# Patient Record
Sex: FEMALE | Race: BLACK OR AFRICAN AMERICAN | NOT HISPANIC OR LATINO | Employment: FULL TIME | URBAN - METROPOLITAN AREA
[De-identification: names, ages, dates, MRNs, and addresses within clinical notes are randomized per-mention and may not be internally consistent; named-entity substitution may affect disease eponyms.]

---

## 2018-05-30 PROBLEM — E66.9 OBESITY: Status: ACTIVE | Noted: 2018-05-30

## 2019-06-15 ENCOUNTER — OFFICE VISIT (OUTPATIENT)
Dept: URGENT CARE | Facility: CLINIC | Age: 24
End: 2019-06-15
Payer: COMMERCIAL

## 2019-06-15 VITALS
HEART RATE: 124 BPM | SYSTOLIC BLOOD PRESSURE: 141 MMHG | OXYGEN SATURATION: 100 % | DIASTOLIC BLOOD PRESSURE: 82 MMHG | BODY MASS INDEX: 37.91 KG/M2 | WEIGHT: 206 LBS | HEIGHT: 62 IN | RESPIRATION RATE: 19 BRPM | TEMPERATURE: 102 F

## 2019-06-15 DIAGNOSIS — J02.9 SORE THROAT: ICD-10-CM

## 2019-06-15 DIAGNOSIS — J03.90 EXUDATIVE TONSILLITIS: Primary | ICD-10-CM

## 2019-06-15 DIAGNOSIS — R50.9 FEVER, UNSPECIFIED FEVER CAUSE: ICD-10-CM

## 2019-06-15 DIAGNOSIS — R03.0 ELEVATED BP WITHOUT DIAGNOSIS OF HYPERTENSION: ICD-10-CM

## 2019-06-15 LAB
CTP QC/QA: YES
CTP QC/QA: YES
FLUAV AG NPH QL: NEGATIVE
FLUBV AG NPH QL: NEGATIVE
S PYO RRNA THROAT QL PROBE: NEGATIVE

## 2019-06-15 PROCEDURE — 99203 PR OFFICE/OUTPT VISIT, NEW, LEVL III, 30-44 MIN: ICD-10-PCS | Mod: 25,S$GLB,, | Performed by: NURSE PRACTITIONER

## 2019-06-15 PROCEDURE — 87070 CULTURE OTHR SPECIMN AEROBIC: CPT

## 2019-06-15 PROCEDURE — 87804 INFLUENZA ASSAY W/OPTIC: CPT | Mod: QW,S$GLB,, | Performed by: NURSE PRACTITIONER

## 2019-06-15 PROCEDURE — 99203 OFFICE O/P NEW LOW 30 MIN: CPT | Mod: 25,S$GLB,, | Performed by: NURSE PRACTITIONER

## 2019-06-15 PROCEDURE — 87147 CULTURE TYPE IMMUNOLOGIC: CPT

## 2019-06-15 PROCEDURE — 87880 POCT RAPID STREP A: ICD-10-PCS | Mod: QW,S$GLB,, | Performed by: NURSE PRACTITIONER

## 2019-06-15 PROCEDURE — 87804 POCT INFLUENZA A/B: ICD-10-PCS | Mod: 59,QW,S$GLB, | Performed by: NURSE PRACTITIONER

## 2019-06-15 PROCEDURE — 87880 STREP A ASSAY W/OPTIC: CPT | Mod: QW,S$GLB,, | Performed by: NURSE PRACTITIONER

## 2019-06-15 PROCEDURE — 96372 THER/PROPH/DIAG INJ SC/IM: CPT | Mod: S$GLB,,, | Performed by: NURSE PRACTITIONER

## 2019-06-15 PROCEDURE — 96372 PR INJECTION,THERAP/PROPH/DIAG2ST, IM OR SUBCUT: ICD-10-PCS | Mod: S$GLB,,, | Performed by: NURSE PRACTITIONER

## 2019-06-15 RX ORDER — BETAMETHASONE SODIUM PHOSPHATE AND BETAMETHASONE ACETATE 3; 3 MG/ML; MG/ML
6 INJECTION, SUSPENSION INTRA-ARTICULAR; INTRALESIONAL; INTRAMUSCULAR; SOFT TISSUE
Status: COMPLETED | OUTPATIENT
Start: 2019-06-15 | End: 2019-06-15

## 2019-06-15 RX ORDER — AMOXICILLIN 875 MG/1
875 TABLET, FILM COATED ORAL 2 TIMES DAILY
Qty: 20 TABLET | Refills: 0 | Status: SHIPPED | OUTPATIENT
Start: 2019-06-15 | End: 2019-06-25

## 2019-06-15 RX ORDER — TRIPROLIDINE/PSEUDOEPHEDRINE 2.5MG-60MG
600 TABLET ORAL
Status: COMPLETED | OUTPATIENT
Start: 2019-06-15 | End: 2019-06-15

## 2019-06-15 RX ORDER — PREDNISONE 20 MG/1
40 TABLET ORAL DAILY
Qty: 10 TABLET | Refills: 0 | Status: SHIPPED | OUTPATIENT
Start: 2019-06-15 | End: 2019-06-20

## 2019-06-15 RX ADMIN — Medication 600 MG: at 12:06

## 2019-06-15 RX ADMIN — BETAMETHASONE SODIUM PHOSPHATE AND BETAMETHASONE ACETATE 6 MG: 3; 3 INJECTION, SUSPENSION INTRA-ARTICULAR; INTRALESIONAL; INTRAMUSCULAR; SOFT TISSUE at 12:06

## 2019-06-15 NOTE — PATIENT INSTRUCTIONS
If your condition worsens or fails to improve we recommend that you receive another evaluation at the emergency room immediately or contact your primary medical clinic to discuss your concerns.   You must understand that you have received an Urgent Care treatment only and that you may be released before all of your medical problems are known or treated. You, the patient, will arrange for follow up care as instructed.   Please return here or go to the Emergency Department for any concerns or worsening of condition.  If you were prescribed antibiotics, please take them to completion.  If you were prescribed a narcotic medication, do not drive or operate heavy equipment or machinery while taking these medications.  Please follow up with your primary care doctor or specialist as needed.    If you  smoke, please stop smoking.    Pharyngitis (Sore Throat), Report Pending    Pharyngitis (sore throat) is often due to a virus. It can also be caused by the streptococcus, or strep, bacterium, often called strep throat. Both viral and strep infections can cause throat pain that is worse when swallowing, aching all over with headache, and fever. Both types of infections are contagious. They may be spread by coughing, kissing, or touching others after touching your mouth or nose.  A test has been done to find out whether you (or your child, if your child is the patient) have strep throat. Call this facility or your healthcare provider if you were not given your test results. If the test is positive for strep infection, you will need to take antibiotic medicines. A prescription can be called into your pharmacy at that time. If the test is negative, you probably have a viral pharyngitis. This does not need to be treated with antibiotics. Until you receive the results of the strep test, you should stay home from work. If your child is being tested, he or she should stay home from school.  Home care  · Rest at home. Drink plenty of  fluids so you won't get dehydrated.  · If the test is positive for strep, don't go to work or school for the first 2 days of taking the antibiotics. After this time, you will not be contagious. You can then return to work or school if you are feeling better.   · Take the antibiotic medicine for the full 10 days, even if you feel better. This is very important to make sure the infection is treated. It is also important to prevent drug-resistant germs from developing. If you were given an antibiotic shot, you won't need more antibiotics.  · For children: Use acetaminophen for fever, fussiness, or discomfort. In infants older than 6 months of age, you may use ibuprofen instead of acetaminophen. Talk with your child's healthcare provider before giving these medicines if your child has chronic liver or kidney disease or ever had a stomach ulcer or GI bleeding. Never give aspirin to a child under 18 years of age who is ill with a fever. It may cause severe liver damage.  · For adults: Use acetaminophen or ibuprofen to control pain or fever, unless another medicine was prescribed for this. Talk with your healthcare provider before taking these medicines if you have chronic liver or kidney disease or ever had a stomach ulcer or GI bleeding.  · Use throat lozenges or numbing throat sprays to help reduce pain. Gargling with warm salt water will also help reduce throat pain. For this, dissolve 1/2 teaspoon of salt in 1 glass of warm water. To help soothe a sore throat, children can sip on juice or a popsicle. Children 5 years and older can also suck on a lollipop or hard candy.  · Don't eat salty or spicy foods. These can irritate the throat.  Follow-up care  Follow up with your healthcare provider or our staff if you don't get better over the next week.  When to seek medical advice  Call your healthcare provider right away if any of these occur:  · Fever as directed by your healthcare provider. For children, seek care  if:  ¨ Your child is of any age and has repeated fevers above 104°F (40°C).  ¨ Your child is younger than 2 years of age and has a fever of 100.4°F (38°C) that continues for more than 1 day.  ¨ Your child is 2 years old or older and has a fever of 100.4°F (38°C) that continues for more than 3 days.  · New or worsening ear pain, sinus pain, or headache  · Painful lumps in the back of neck  · Stiff neck  · Lymph nodes are getting larger  · Inability to swallow liquids, excessive drooling, or inability to open mouth wide due to throat pain  · Signs of dehydration (very dark urine or no urine, sunken eyes, dizziness)  · Trouble breathing or noisy breathing  · Muffled voice  · New rash  · Child appears to be getting sicker  Date Last Reviewed: 4/13/2015  © 7301-3333 [employer]. [address] [place] [address]. All rights reserved. This information is not intended as a substitute for professional medical care. Always follow your healthcare professional's instructions.        When Your Child Has Pharyngitis or Tonsillitis    Your childs throat feels sore. This is likely because of redness and swelling (inflammation) of the throat. Two areas of the throat are most often affected: the pharynx and tonsils. Inflammation of the pharynx (pharyngitis) and inflammation of the tonsils (tonsillitis) are very common in children. This sheet tells you what you can do to relieve your childs throat pain.  What causes pharyngitis or tonsillitis?  Most commonly, pharyngitis and tonsillitis are caused by a viral or bacterial infection.  What are the symptoms of pharyngitis or tonsillitis?  The main symptom of both conditions is a sore throat. Your child may also have a fever, redness or swelling of the throat, and trouble swallowing. You may feel lumps in the neck.  How is pharyngitis or tonsillitis diagnosed?  The healthcare provider will examine your childs throat. The healthcare provider might wipe (swab) your  childs throat. This swab will be tested for the bacteria that causes an infection called strep throat. If needed, a blood test can be done to check for a viral infection such as mononucleosis.  How is pharyngitis or tonsillitis treated?  If your childs sore throat is caused by a bacterial infection, the healthcare provider may prescribe antibiotics. Otherwise, you can treat your childs sore throat at home. To do this:  · Give your child acetaminophen or ibuprofen to ease the pain. Don't use ibuprofen in children younger than 6 months of age or in children who are dehydrated or vomiting all of the time. Dont give your child aspirin to relieve a fever. Using aspirin to treat a fever in children could cause a serious condition called Reye syndrome.  · Give your child cool liquids to drink.  · Have your child gargle with warm saltwater if it helps relieve pain. An over-the-counter throat numbing spray may also help.  What are the long-term concerns?  If your child has frequent sore throats, take him or her to see a healthcare provider. Removing the tonsils may help relieve your childs recurring problems.  When to call your child's healthcare provider  Call your childs healthcare provider right away if your otherwise healthy child has any of the following:  · Fever (see Fever and children, below)  · Sore throat pain that persists for 2 to 3 days  · Sore throat with fever, headache, stomachache, or rash  · Trouble turning or straightening the head  · Problems swallowing or drooling  · Trouble breathing or needing to lean forward to breathe  · Problems opening mouth fully     Fever and children  Always use a digital thermometer to check your childs temperature. Never use a mercury thermometer.  For infants and toddlers, be sure to use a rectal thermometer correctly. A rectal thermometer may accidentally poke a hole in (perforate) the rectum. It may also pass on germs from the stool. Always follow the product makers  directions for proper use. If you dont feel comfortable taking a rectal temperature, use another method. When you talk to your childs healthcare provider, tell him or her which method you used to take your childs temperature.  Here are guidelines for fever temperature. Ear temperatures arent accurate before 6 months of age. Dont take an oral temperature until your child is at least 4 years old.  Infant under 3 months old:  · Ask your childs healthcare provider how you should take the temperature.  · Rectal or forehead (temporal artery) temperature of 100.4°F (38°C) or higher, or as directed by the provider  · Armpit temperature of 99°F (37.2°C) or higher, or as directed by the provider  Child age 3 to 36 months:  · Rectal, forehead (temporal artery), or ear temperature of 102°F (38.9°C) or higher, or as directed by the provider  · Armpit temperature of 101°F (38.3°C) or higher, or as directed by the provider  Child of any age:  · Repeated temperature of 104°F (40°C) or higher, or as directed by the provider  · Fever that lasts more than 24 hours in a child under 2 years old. Or a fever that lasts for 3 days in a child 2 years or older.   Date Last Reviewed: 11/1/2016 © 2000-2017 [employer]. [address] [place] [address], [place] [address]. All rights reserved. This information is not intended as a substitute for professional medical care. Always follow your healthcare professional's instructions.

## 2019-06-15 NOTE — LETTER
Anny 15, 2019      [facility] [address] [address]  Phone: [phone]  Fax: [phone]       Patient: Karen Paredes   YOB: 1995  Date of Visit: 06/15/2019    To Whom It May Concern:    Meghan Paredes  was at [facility] on 06/15/2019. She may return to work/school on 06/17/19 with no restrictions. If you have any questions or concerns, or if I can be of further assistance, please do not hesitate to contact me.    Sincerely,    Kati Ramos NP

## 2019-06-15 NOTE — PROGRESS NOTES
"Subjective:       Patient ID: Karen Paredes is a 23 y.o. female.    Vitals:  height is 5' 2" (1.575 m) and weight is 93.4 kg (206 lb). Her temperature is 102 °F (38.9 °C) (abnormal). Her blood pressure is 141/82 (abnormal) and her pulse is 124 (abnormal). Her respiration is 19 and oxygen saturation is 100%.     Chief Complaint: Sore Throat    Sore throat, fever, headache, and chills for 2 days which is progressively becoming worse.  Has a history of frequent tonsillitis in the past.    Sore Throat    This is a new problem. The current episode started in the past 7 days. The problem has been unchanged. The maximum temperature recorded prior to her arrival was 102 - 102.9 F. The fever has been present for 1 to 2 days. The pain is at a severity of 6/10. The pain is moderate. Associated symptoms include headaches, swollen glands and trouble swallowing. Pertinent negatives include no congestion, coughing, ear pain, shortness of breath, stridor or vomiting. She has tried NSAIDs for the symptoms. The treatment provided mild relief.       Constitution: Positive for chills, fatigue and fever. Negative for sweating.   HENT: Positive for sore throat and trouble swallowing. Negative for ear pain, congestion, sinus pain, sinus pressure and voice change.    Neck: Positive for painful lymph nodes.   Eyes: Negative for eye redness.   Respiratory: Negative for chest tightness, cough, sputum production, bloody sputum, COPD, shortness of breath, stridor, wheezing and asthma.    Gastrointestinal: Negative for nausea and vomiting.   Musculoskeletal: Positive for muscle ache.   Skin: Negative for rash.   Allergic/Immunologic: Negative for seasonal allergies and asthma.   Neurological: Positive for headaches.   Hematologic/Lymphatic: Positive for swollen lymph nodes.       Objective:      Physical Exam   Constitutional: She is oriented to person, place, and time. She appears well-developed and well-nourished. She is cooperative.  " Non-toxic appearance. She appears ill. No distress.   Elevated bp, febrile, tachycardic   HENT:   Head: Normocephalic and atraumatic.   Right Ear: Hearing, tympanic membrane, external ear and ear canal normal.   Left Ear: Hearing, tympanic membrane, external ear and ear canal normal.   Nose: Nose normal. No mucosal edema, rhinorrhea or nasal deformity. No epistaxis. Right sinus exhibits no maxillary sinus tenderness and no frontal sinus tenderness. Left sinus exhibits no maxillary sinus tenderness and no frontal sinus tenderness.   Mouth/Throat: Uvula is midline and mucous membranes are normal. No trismus in the jaw. Normal dentition. No uvula swelling. Oropharyngeal exudate and posterior oropharyngeal erythema present. Tonsils are 3+ on the right. Tonsils are 3+ on the left. Tonsillar exudate.   Eyes: Pupils are equal, round, and reactive to light. EOM and lids are normal. Right conjunctiva is injected. Left conjunctiva is injected. No scleral icterus.   Sclera clear bilat   Neck: Trachea normal, normal range of motion, full passive range of motion without pain and phonation normal. Neck supple. Muscular tenderness present. No spinous process tenderness present. No neck rigidity. Normal range of motion present.   Cardiovascular: Regular rhythm, S1 normal, S2 normal, normal heart sounds and normal pulses. Tachycardia present.   Pulmonary/Chest: Effort normal and breath sounds normal. No respiratory distress.   Abdominal: Soft. Normal appearance and bowel sounds are normal. She exhibits no distension. There is no tenderness.   Musculoskeletal: Normal range of motion. She exhibits no edema or deformity.   Lymphadenopathy:     She has cervical adenopathy.        Right cervical: Superficial cervical adenopathy present.        Left cervical: Superficial cervical adenopathy present.   Neurological: She is alert and oriented to person, place, and time. She exhibits normal muscle tone. Coordination normal.   Skin: Skin is  warm, dry and intact. Capillary refill takes less than 2 seconds. She is not diaphoretic. No pallor.   Psychiatric: She has a normal mood and affect. Her speech is normal and behavior is normal. Judgment and thought content normal. Cognition and memory are normal.   Nursing note and vitals reviewed.      Results for orders placed or performed in visit on 06/15/19   POCT rapid strep A   Result Value Ref Range    Rapid Strep A Screen Negative Negative     Acceptable Yes    POCT Influenza A/B   Result Value Ref Range    Rapid Influenza A Ag Negative Negative    Rapid Influenza B Ag Negative Negative     Acceptable Yes      Assessment:       1. Exudative tonsillitis    2. Sore throat    3. Fever, unspecified fever cause    4. Elevated BP without diagnosis of hypertension        Plan:         Exudative tonsillitis  -     betamethasone acetate-betamethasone sodium phosphate injection 6 mg  -     predniSONE (DELTASONE) 20 MG tablet; Take 2 tablets (40 mg total) by mouth once daily. for 5 days  Dispense: 10 tablet; Refill: 0  -     diphenhydrAMINE-aluminum-magnesium hydroxide-simethicone-lidocaine HCl 2%; Swish and spit 15 mLs every 4 (four) hours as needed (sore throat).  Dispense: 380 mL; Refill: 0  -     amoxicillin (AMOXIL) 875 MG tablet; Take 1 tablet (875 mg total) by mouth 2 (two) times daily. for 10 days  Dispense: 20 tablet; Refill: 0  -     ibuprofen 100 mg/5 mL suspension 600 mg  -     Ambulatory referral to ENT    Sore throat  -     POCT rapid strep A  -     Culture, Throat  -     betamethasone acetate-betamethasone sodium phosphate injection 6 mg  -     predniSONE (DELTASONE) 20 MG tablet; Take 2 tablets (40 mg total) by mouth once daily. for 5 days  Dispense: 10 tablet; Refill: 0  -     diphenhydrAMINE-aluminum-magnesium hydroxide-simethicone-lidocaine HCl 2%; Swish and spit 15 mLs every 4 (four) hours as needed (sore throat).  Dispense: 380 mL; Refill: 0  -     amoxicillin  (AMOXIL) 875 MG tablet; Take 1 tablet (875 mg total) by mouth 2 (two) times daily. for 10 days  Dispense: 20 tablet; Refill: 0  -     ibuprofen 100 mg/5 mL suspension 600 mg    Fever, unspecified fever cause  -     POCT rapid strep A  -     POCT Influenza A/B  -     Culture, Throat  -     ibuprofen 100 mg/5 mL suspension 600 mg    Elevated BP without diagnosis of hypertension      Patient Instructions       If your condition worsens or fails to improve we recommend that you receive another evaluation at the emergency room immediately or contact your primary medical clinic to discuss your concerns.   You must understand that you have received an Urgent Care treatment only and that you may be released before all of your medical problems are known or treated. You, the patient, will arrange for follow up care as instructed.   Please return here or go to the Emergency Department for any concerns or worsening of condition.  If you were prescribed antibiotics, please take them to completion.  If you were prescribed a narcotic medication, do not drive or operate heavy equipment or machinery while taking these medications.  Please follow up with your primary care doctor or specialist as needed.    If you  smoke, please stop smoking.    Pharyngitis (Sore Throat), Report Pending    Pharyngitis (sore throat) is often due to a virus. It can also be caused by the streptococcus, or strep, bacterium, often called strep throat. Both viral and strep infections can cause throat pain that is worse when swallowing, aching all over with headache, and fever. Both types of infections are contagious. They may be spread by coughing, kissing, or touching others after touching your mouth or nose.  A test has been done to find out whether you (or your child, if your child is the patient) have strep throat. Call this facility or your healthcare provider if you were not given your test results. If the test is positive for strep infection, you  will need to take antibiotic medicines. A prescription can be called into your pharmacy at that time. If the test is negative, you probably have a viral pharyngitis. This does not need to be treated with antibiotics. Until you receive the results of the strep test, you should stay home from work. If your child is being tested, he or she should stay home from school.  Home care  · Rest at home. Drink plenty of fluids so you won't get dehydrated.  · If the test is positive for strep, don't go to work or school for the first 2 days of taking the antibiotics. After this time, you will not be contagious. You can then return to work or school if you are feeling better.   · Take the antibiotic medicine for the full 10 days, even if you feel better. This is very important to make sure the infection is treated. It is also important to prevent drug-resistant germs from developing. If you were given an antibiotic shot, you won't need more antibiotics.  · For children: Use acetaminophen for fever, fussiness, or discomfort. In infants older than 6 months of age, you may use ibuprofen instead of acetaminophen. Talk with your child's healthcare provider before giving these medicines if your child has chronic liver or kidney disease or ever had a stomach ulcer or GI bleeding. Never give aspirin to a child under 18 years of age who is ill with a fever. It may cause severe liver damage.  · For adults: Use acetaminophen or ibuprofen to control pain or fever, unless another medicine was prescribed for this. Talk with your healthcare provider before taking these medicines if you have chronic liver or kidney disease or ever had a stomach ulcer or GI bleeding.  · Use throat lozenges or numbing throat sprays to help reduce pain. Gargling with warm salt water will also help reduce throat pain. For this, dissolve 1/2 teaspoon of salt in 1 glass of warm water. To help soothe a sore throat, children can sip on juice or a popsicle. Children 5  years and older can also suck on a lollipop or hard candy.  · Don't eat salty or spicy foods. These can irritate the throat.  Follow-up care  Follow up with your healthcare provider or our staff if you don't get better over the next week.  When to seek medical advice  Call your healthcare provider right away if any of these occur:  · Fever as directed by your healthcare provider. For children, seek care if:  ¨ Your child is of any age and has repeated fevers above 104°F (40°C).  ¨ Your child is younger than 2 years of age and has a fever of 100.4°F (38°C) that continues for more than 1 day.  ¨ Your child is 2 years old or older and has a fever of 100.4°F (38°C) that continues for more than 3 days.  · New or worsening ear pain, sinus pain, or headache  · Painful lumps in the back of neck  · Stiff neck  · Lymph nodes are getting larger  · Inability to swallow liquids, excessive drooling, or inability to open mouth wide due to throat pain  · Signs of dehydration (very dark urine or no urine, sunken eyes, dizziness)  · Trouble breathing or noisy breathing  · Muffled voice  · New rash  · Child appears to be getting sicker  Date Last Reviewed: 4/13/2015  © 6671-6992 [employer]. [address] [place] [address], [place] [address]. All rights reserved. This information is not intended as a substitute for professional medical care. Always follow your healthcare professional's instructions.        When Your Child Has Pharyngitis or Tonsillitis    Your childs throat feels sore. This is likely because of redness and swelling (inflammation) of the throat. Two areas of the throat are most often affected: the pharynx and tonsils. Inflammation of the pharynx (pharyngitis) and inflammation of the tonsils (tonsillitis) are very common in children. This sheet tells you what you can do to relieve your childs throat pain.  What causes pharyngitis or tonsillitis?  Most commonly, pharyngitis and tonsillitis are caused by a viral or  bacterial infection.  What are the symptoms of pharyngitis or tonsillitis?  The main symptom of both conditions is a sore throat. Your child may also have a fever, redness or swelling of the throat, and trouble swallowing. You may feel lumps in the neck.  How is pharyngitis or tonsillitis diagnosed?  The healthcare provider will examine your childs throat. The healthcare provider might wipe (swab) your childs throat. This swab will be tested for the bacteria that causes an infection called strep throat. If needed, a blood test can be done to check for a viral infection such as mononucleosis.  How is pharyngitis or tonsillitis treated?  If your childs sore throat is caused by a bacterial infection, the healthcare provider may prescribe antibiotics. Otherwise, you can treat your childs sore throat at home. To do this:  · Give your child acetaminophen or ibuprofen to ease the pain. Don't use ibuprofen in children younger than 6 months of age or in children who are dehydrated or vomiting all of the time. Dont give your child aspirin to relieve a fever. Using aspirin to treat a fever in children could cause a serious condition called Reye syndrome.  · Give your child cool liquids to drink.  · Have your child gargle with warm saltwater if it helps relieve pain. An over-the-counter throat numbing spray may also help.  What are the long-term concerns?  If your child has frequent sore throats, take him or her to see a healthcare provider. Removing the tonsils may help relieve your childs recurring problems.  When to call your child's healthcare provider  Call your childs healthcare provider right away if your otherwise healthy child has any of the following:  · Fever (see Fever and children, below)  · Sore throat pain that persists for 2 to 3 days  · Sore throat with fever, headache, stomachache, or rash  · Trouble turning or straightening the head  · Problems swallowing or drooling  · Trouble breathing or needing to  lean forward to breathe  · Problems opening mouth fully     Fever and children  Always use a digital thermometer to check your childs temperature. Never use a mercury thermometer.  For infants and toddlers, be sure to use a rectal thermometer correctly. A rectal thermometer may accidentally poke a hole in (perforate) the rectum. It may also pass on germs from the stool. Always follow the product makers directions for proper use. If you dont feel comfortable taking a rectal temperature, use another method. When you talk to your childs healthcare provider, tell him or her which method you used to take your childs temperature.  Here are guidelines for fever temperature. Ear temperatures arent accurate before 6 months of age. Dont take an oral temperature until your child is at least 4 years old.  Infant under 3 months old:  · Ask your childs healthcare provider how you should take the temperature.  · Rectal or forehead (temporal artery) temperature of 100.4°F (38°C) or higher, or as directed by the provider  · Armpit temperature of 99°F (37.2°C) or higher, or as directed by the provider  Child age 3 to 36 months:  · Rectal, forehead (temporal artery), or ear temperature of 102°F (38.9°C) or higher, or as directed by the provider  · Armpit temperature of 101°F (38.3°C) or higher, or as directed by the provider  Child of any age:  · Repeated temperature of 104°F (40°C) or higher, or as directed by the provider  · Fever that lasts more than 24 hours in a child under 2 years old. Or a fever that lasts for 3 days in a child 2 years or older.   Date Last Reviewed: 11/1/2016  © 3767-9211 [employer]. [address] [place] [address], [place] [address]. All rights reserved. This information is not intended as a substitute for professional medical care. Always follow your healthcare professional's instructions.

## 2019-06-16 ENCOUNTER — TELEPHONE (OUTPATIENT)
Dept: URGENT CARE | Facility: CLINIC | Age: 24
End: 2019-06-16

## 2019-06-16 NOTE — TELEPHONE ENCOUNTER
Spoke with patient about positive strep C results.  Patient on appropriate antibiotic at this time

## 2019-06-17 LAB — BACTERIA THROAT CULT: NORMAL

## 2020-09-16 ENCOUNTER — OFFICE VISIT (OUTPATIENT)
Dept: URGENT CARE | Facility: CLINIC | Age: 25
End: 2020-09-16
Payer: COMMERCIAL

## 2020-09-16 VITALS
HEIGHT: 61 IN | SYSTOLIC BLOOD PRESSURE: 121 MMHG | BODY MASS INDEX: 35.87 KG/M2 | DIASTOLIC BLOOD PRESSURE: 58 MMHG | OXYGEN SATURATION: 97 % | TEMPERATURE: 98 F | RESPIRATION RATE: 18 BRPM | HEART RATE: 111 BPM | WEIGHT: 190 LBS

## 2020-09-16 DIAGNOSIS — J02.9 SORE THROAT: Primary | ICD-10-CM

## 2020-09-16 DIAGNOSIS — J30.9 ALLERGIC RHINITIS, UNSPECIFIED SEASONALITY, UNSPECIFIED TRIGGER: ICD-10-CM

## 2020-09-16 LAB
CTP QC/QA: YES
CTP QC/QA: YES
MOLECULAR STREP A: NEGATIVE
SARS-COV-2 RDRP RESP QL NAA+PROBE: NEGATIVE

## 2020-09-16 PROCEDURE — 87651 STREP A DNA AMP PROBE: CPT | Mod: QW,S$GLB,, | Performed by: FAMILY MEDICINE

## 2020-09-16 PROCEDURE — 87651 POCT STREP A MOLECULAR: ICD-10-PCS | Mod: QW,S$GLB,, | Performed by: FAMILY MEDICINE

## 2020-09-16 PROCEDURE — U0002: ICD-10-PCS | Mod: S$GLB,,, | Performed by: FAMILY MEDICINE

## 2020-09-16 PROCEDURE — U0002 COVID-19 LAB TEST NON-CDC: HCPCS | Mod: S$GLB,,, | Performed by: FAMILY MEDICINE

## 2020-09-16 PROCEDURE — 3008F BODY MASS INDEX DOCD: CPT | Mod: CPTII,S$GLB,, | Performed by: FAMILY MEDICINE

## 2020-09-16 PROCEDURE — 99214 OFFICE O/P EST MOD 30 MIN: CPT | Mod: S$GLB,,, | Performed by: FAMILY MEDICINE

## 2020-09-16 PROCEDURE — 99214 PR OFFICE/OUTPT VISIT, EST, LEVL IV, 30-39 MIN: ICD-10-PCS | Mod: S$GLB,,, | Performed by: FAMILY MEDICINE

## 2020-09-16 PROCEDURE — 3008F PR BODY MASS INDEX (BMI) DOCUMENTED: ICD-10-PCS | Mod: CPTII,S$GLB,, | Performed by: FAMILY MEDICINE

## 2020-09-16 NOTE — PROGRESS NOTES
"Subjective:       Patient ID: Karen Paredes is a 25 y.o. female.    Vitals:  height is 5' 1" (1.549 m) and weight is 86.2 kg (190 lb). Her temperature is 98.4 °F (36.9 °C). Her blood pressure is 121/58 (abnormal) and her pulse is 111 (abnormal). Her respiration is 18 and oxygen saturation is 97%.     Chief Complaint: Sore Throat    Patient has had a sore throat for the past week. Denies fever, chills, cough running nose, headache, body aches, SOB, CP,abd pain, N/V, diarrhea, loss of smell or taste.   Patient would like refferal to ENT. Pt also reports nasal congestion and PND. Reports hx of AR. Has been on zyrtec and flonase.     Sore Throat   This is a new problem. The current episode started in the past 7 days. Associated symptoms include trouble swallowing. Pertinent negatives include no congestion, coughing, diarrhea, headaches, shortness of breath or vomiting. She has tried nothing for the symptoms.       Constitution: Negative. Negative for chills, fatigue and fever.   HENT: Positive for sore throat and trouble swallowing. Negative for congestion.    Neck: Negative for painful lymph nodes.   Cardiovascular: Negative for chest pain and leg swelling.   Eyes: Negative.  Negative for double vision and blurred vision.   Respiratory: Negative for cough and shortness of breath.    Gastrointestinal: Negative for nausea, vomiting and diarrhea.   Genitourinary: Negative.  Negative for dysuria, frequency, urgency and history of kidney stones.   Musculoskeletal: Negative for joint pain, joint swelling, muscle cramps and muscle ache.   Skin: Negative for color change, pale, rash and bruising.   Allergic/Immunologic: Negative for seasonal allergies.   Neurological: Negative for dizziness, history of vertigo, light-headedness, passing out and headaches.   Hematologic/Lymphatic: Negative for swollen lymph nodes.   Psychiatric/Behavioral: Negative for nervous/anxious, sleep disturbance and depression. The patient is not " nervous/anxious.        Objective:      Physical Exam   Constitutional: She is oriented to person, place, and time. She appears well-developed. She is cooperative.  Non-toxic appearance. She does not appear ill. No distress.   HENT:   Head: Normocephalic and atraumatic.   Ears:   Right Ear: Hearing, tympanic membrane, external ear and ear canal normal. impacted cerumen  Left Ear: Hearing, tympanic membrane, external ear and ear canal normal. impacted cerumen  Nose: Congestion present. No mucosal edema, rhinorrhea or nasal deformity. No epistaxis. Right sinus exhibits no maxillary sinus tenderness and no frontal sinus tenderness. Left sinus exhibits no maxillary sinus tenderness and no frontal sinus tenderness.   Mouth/Throat: Uvula is midline and mucous membranes are normal. No trismus in the jaw. Normal dentition. No uvula swelling. Posterior oropharyngeal erythema present. No oropharyngeal exudate.      Comments: +ve b/l pharybgeal erythema without significant swelling.   Eyes: Conjunctivae and lids are normal. Right eye exhibits no discharge. Left eye exhibits no discharge. No scleral icterus.   Neck: Trachea normal, normal range of motion, full passive range of motion without pain and phonation normal. Neck supple.   Cardiovascular: Normal rate, regular rhythm, normal heart sounds and normal pulses.   Pulmonary/Chest: Effort normal and breath sounds normal. No stridor. No respiratory distress. She has no wheezes. She has no rhonchi. She has no rales. She exhibits no tenderness.   Abdominal: Soft. Normal appearance and bowel sounds are normal. She exhibits no distension, no pulsatile midline mass and no mass. There is no abdominal tenderness.   Musculoskeletal: Normal range of motion.         General: No deformity.   Lymphadenopathy:     She has no cervical adenopathy.   Neurological: She is alert and oriented to person, place, and time. She exhibits normal muscle tone. Coordination normal.   Skin: Skin is warm,  dry, intact, not diaphoretic and not pale. Psychiatric: Her speech is normal and behavior is normal. Judgment and thought content normal.   Nursing note and vitals reviewed.        Assessment:       1. Sore throat    2. Allergic rhinitis, unspecified seasonality, unspecified trigger        Plan:         Sore throat  -     POCT Strep A, Molecular  -     POCT COVID-19 Rapid Screening  -     Ambulatory referral/consult to ENT    Allergic rhinitis, unspecified seasonality, unspecified trigger  -     Ambulatory referral/consult to ENT        PLEASE READ YOUR DISCHARGE INSTRUCTIONS ENTIRELY AS IT CONTAINS IMPORTANT INFORMATION.    Please return here or go to the Emergency Department for any concerns or worsening of condition.      If not allergic, please take over the counter Tylenol (Acetaminophen) and/or Motrin (Ibuprofen) as directed for control of pain and/or fever.  Please follow up with your primary care doctor or specialist as needed.      If you smoke, please stop smoking.    Please return or see your primary care doctor if you develop new or worsening symptoms.     Please arrange follow up with your primary medical clinic as soon as possible. You must understand that you've received an Urgent Care treatment only and that you may be released before all of your medical problems are known or treated. You, the patient, will arrange for follow up as instructed. If your symptoms worsen or fail to improve you should go to the Emergency Room.  Understanding Nasal Allergies  Nasal allergies (also called allergic rhinitis) are a common health problem. They may be seasonal. This means they cause symptoms only at certain times of the year. Or they may be perennial. This means they cause symptoms all year long. Other health problems, such as asthma, often occur along with allergies as well.    What is an allergic reaction?  An allergy is a reaction to a substance called an allergen. Common allergens include:  · Wind-borne  pollen  · Mold  · Dust mites  · Furry and feathered animals  · Cockroaches  Normally, allergens are harmless. But when a person has allergies, the body thinks they are harmful. The body then attacks allergens with antibodies. Antibodies are attached to special cells called mast cells. Allergens stick to the antibodies. This makes the mast cells release histamine and other chemicals. This is an allergic reaction. The chemicals irritate nearby nasal tissue. This causes nasal allergy symptoms.  Common nasal allergy symptoms  Allergies can cause nasal tissue to swell. This makes the air passages smaller. The nose may feel stuffed up. The nose may also make extra mucus, which can plug the nasal passages or drip out of the nose. Mucus can drip down the back of the throat (postnasal drip) as well. Sinus tissue can swell. This may cause pain and headache. Common allergy symptoms include:  · Runny nose with clear, watery discharge  · Stuffy nose (nasal congestion)  · Drainage down your throat (postnasal drip)  · Sneezing  · Red, watery eyes  · Itchy nose, eyes, ears, and throat  · Plugged-up ears (ear congestion)  · Sore throat  · Coughing  · Sinus pain and swelling  · Headache  It may not be allergies  Other health problems can cause symptoms like those of nasal allergies. These include:  · Nonallergic rhinitis and viruses such as colds  · Irritants and pollutants, such as strong odors or smoke  · Certain medicines  · Changes in the weather   Treatment  Your healthcare provider will evaluate you to find the cause of your symptoms then recommend treatment. If your symptoms are due to nasal allergies, your healthcare provider may prescribe nasal steroid sprays or oral antihistamines to help reduce symptoms. Avoidance of the allergen will also be suggested. You may also be referred to an allergist.   Date Last Reviewed: 10/1/2016  © 9867-8836 [employer]. [address] [place] [address], [place] [address]. All rights  reserved. This information is not intended as a substitute for professional medical care. Always follow your healthcare professional's instructions.        Understanding Nasal Anatomy: Outside View    Why does your nose look the way it does? And what goes on inside a nose to let a person breathe easily? Learning the anatomy of the nose can help you better understand the answers to these questions.  The outside view  One way to tell if a nose fits well with a face (aesthetics) is to divide the face into equal thirds. The nose should fit within the middle third. Then, thinking of the nose as a triangle can help you see the best size and shape for the nose.  · From the front. Imagine a triangle starting at the top of your nose and extending to the outer corners of your mouth. Does your nose fit inside that triangle? The nose itself should also be shaped like a triangle and balance with the length and width of your face.  · From the side. Imagine a triangle with one side extending along the bridge of the nose and another extending out from the base of the nose. Does your nose fit within this triangle? The tilt of the tip should also balance with the forehead and chin.  · From beneath. The nostrils and tip of the nose should also form a triangle.  Date Last Reviewed: 11/1/2016  © 1556-9479 [employer]. [address] [place] [address], [place] [address]. All rights reserved. This information is not intended as a substitute for professional medical care. Always follow your healthcare professional's instructions.        Self-Care for Sore Throats    Sore throats happen for many reasons, such as colds, allergies, and infections caused by viruses or bacteria. In any case, your throat becomes red and sore. Your goal for self-care is to reduce your discomfort while giving your throat a chance to heal.  Moisten and soothe your throat  Tips include the following:  · Try a sip of water first thing after waking up.  · Keep  your throat moist by drinking 6 or more glasses of clear liquids every day.  · Run a cool-air humidifier in your room overnight.  · Avoid cigarette smoke.   · Suck on throat lozenges, cough drops, hard candy, ice chips, or frozen fruit-juice bars. Use the sugar-free versions if your diet or medical condition requires them.  Gargle to ease irritation  Gargling every hour or 2 can ease irritation. Try gargling with 1 of these solutions:  · 1/4 teaspoon of salt in 1/2 cup of warm water  · An over-the-counter anesthetic gargle  Use medicine for more relief  Over-the-counter medicine can reduce sore throat symptoms. Ask your pharmacist if you have questions about which medicine to use:  · Ease pain with anesthetic sprays. Aspirin or an aspirin substitute also helps. Remember, never give aspirin to anyone 18 or younger, or if you are already taking blood thinners.   · For sore throats caused by allergies, try antihistamines to block the allergic reaction.  · Remember: unless a sore throat is caused by a bacterial infection, antibiotics wont help you.  Prevent future sore throats  Prevention tips include the following:  · Stop smoking or reduce contact with secondhand smoke. Smoke irritates the tender throat lining.  · Limit contact with pets and with allergy-causing substances, such as pollen and mold.  · When youre around someone with a sore throat or cold, wash your hands often to keep viruses or bacteria from spreading.  · Dont strain your vocal cords.  Call your healthcare provider  Contact your healthcare provider if you have:  · A temperature over 101°F (38.3°C)  · White spots on the throat  · Great difficulty swallowing  · Trouble breathing  · A skin rash  · Recent exposure to someone else with strep bacteria  · Severe hoarseness and swollen glands in the neck or jaw   Date Last Reviewed: 8/1/2016  © 1942-9117 [employer]. [address] [place] [address], [place] [address]. All rights reserved. This  information is not intended as a substitute for professional medical care. Always follow your healthcare professional's instructions.

## 2020-09-16 NOTE — PATIENT INSTRUCTIONS
PLEASE READ YOUR DISCHARGE INSTRUCTIONS ENTIRELY AS IT CONTAINS IMPORTANT INFORMATION.    Please return here or go to the Emergency Department for any concerns or worsening of condition.      If not allergic, please take over the counter Tylenol (Acetaminophen) and/or Motrin (Ibuprofen) as directed for control of pain and/or fever.  Please follow up with your primary care doctor or specialist as needed.      If you smoke, please stop smoking.    Please return or see your primary care doctor if you develop new or worsening symptoms.     Please arrange follow up with your primary medical clinic as soon as possible. You must understand that you've received an Urgent Care treatment only and that you may be released before all of your medical problems are known or treated. You, the patient, will arrange for follow up as instructed. If your symptoms worsen or fail to improve you should go to the Emergency Room.  Understanding Nasal Allergies  Nasal allergies (also called allergic rhinitis) are a common health problem. They may be seasonal. This means they cause symptoms only at certain times of the year. Or they may be perennial. This means they cause symptoms all year long. Other health problems, such as asthma, often occur along with allergies as well.    What is an allergic reaction?  An allergy is a reaction to a substance called an allergen. Common allergens include:  · Wind-borne pollen  · Mold  · Dust mites  · Furry and feathered animals  · Cockroaches  Normally, allergens are harmless. But when a person has allergies, the body thinks they are harmful. The body then attacks allergens with antibodies. Antibodies are attached to special cells called mast cells. Allergens stick to the antibodies. This makes the mast cells release histamine and other chemicals. This is an allergic reaction. The chemicals irritate nearby nasal tissue. This causes nasal allergy symptoms.  Common nasal allergy symptoms  Allergies can  cause nasal tissue to swell. This makes the air passages smaller. The nose may feel stuffed up. The nose may also make extra mucus, which can plug the nasal passages or drip out of the nose. Mucus can drip down the back of the throat (postnasal drip) as well. Sinus tissue can swell. This may cause pain and headache. Common allergy symptoms include:  · Runny nose with clear, watery discharge  · Stuffy nose (nasal congestion)  · Drainage down your throat (postnasal drip)  · Sneezing  · Red, watery eyes  · Itchy nose, eyes, ears, and throat  · Plugged-up ears (ear congestion)  · Sore throat  · Coughing  · Sinus pain and swelling  · Headache  It may not be allergies  Other health problems can cause symptoms like those of nasal allergies. These include:  · Nonallergic rhinitis and viruses such as colds  · Irritants and pollutants, such as strong odors or smoke  · Certain medicines  · Changes in the weather   Treatment  Your healthcare provider will evaluate you to find the cause of your symptoms then recommend treatment. If your symptoms are due to nasal allergies, your healthcare provider may prescribe nasal steroid sprays or oral antihistamines to help reduce symptoms. Avoidance of the allergen will also be suggested. You may also be referred to an allergist.   Date Last Reviewed: 10/1/2016  © 4401-2832 [employer]. [address] [place] [address], [place] [address]. All rights reserved. This information is not intended as a substitute for professional medical care. Always follow your healthcare professional's instructions.        Understanding Nasal Anatomy: Outside View    Why does your nose look the way it does? And what goes on inside a nose to let a person breathe easily? Learning the anatomy of the nose can help you better understand the answers to these questions.  The outside view  One way to tell if a nose fits well with a face (aesthetics) is to divide the face into equal thirds. The nose should fit  within the middle third. Then, thinking of the nose as a triangle can help you see the best size and shape for the nose.  · From the front. Imagine a triangle starting at the top of your nose and extending to the outer corners of your mouth. Does your nose fit inside that triangle? The nose itself should also be shaped like a triangle and balance with the length and width of your face.  · From the side. Imagine a triangle with one side extending along the bridge of the nose and another extending out from the base of the nose. Does your nose fit within this triangle? The tilt of the tip should also balance with the forehead and chin.  · From beneath. The nostrils and tip of the nose should also form a triangle.  Date Last Reviewed: 11/1/2016 © 2000-2017 [employer]. [address] [place] [address], [place] [address]. All rights reserved. This information is not intended as a substitute for professional medical care. Always follow your healthcare professional's instructions.        Self-Care for Sore Throats    Sore throats happen for many reasons, such as colds, allergies, and infections caused by viruses or bacteria. In any case, your throat becomes red and sore. Your goal for self-care is to reduce your discomfort while giving your throat a chance to heal.  Moisten and soothe your throat  Tips include the following:  · Try a sip of water first thing after waking up.  · Keep your throat moist by drinking 6 or more glasses of clear liquids every day.  · Run a cool-air humidifier in your room overnight.  · Avoid cigarette smoke.   · Suck on throat lozenges, cough drops, hard candy, ice chips, or frozen fruit-juice bars. Use the sugar-free versions if your diet or medical condition requires them.  Gargle to ease irritation  Gargling every hour or 2 can ease irritation. Try gargling with 1 of these solutions:  · 1/4 teaspoon of salt in 1/2 cup of warm water  · An over-the-counter anesthetic gargle  Use medicine  for more relief  Over-the-counter medicine can reduce sore throat symptoms. Ask your pharmacist if you have questions about which medicine to use:  · Ease pain with anesthetic sprays. Aspirin or an aspirin substitute also helps. Remember, never give aspirin to anyone 18 or younger, or if you are already taking blood thinners.   · For sore throats caused by allergies, try antihistamines to block the allergic reaction.  · Remember: unless a sore throat is caused by a bacterial infection, antibiotics wont help you.  Prevent future sore throats  Prevention tips include the following:  · Stop smoking or reduce contact with secondhand smoke. Smoke irritates the tender throat lining.  · Limit contact with pets and with allergy-causing substances, such as pollen and mold.  · When youre around someone with a sore throat or cold, wash your hands often to keep viruses or bacteria from spreading.  · Dont strain your vocal cords.  Call your healthcare provider  Contact your healthcare provider if you have:  · A temperature over 101°F (38.3°C)  · White spots on the throat  · Great difficulty swallowing  · Trouble breathing  · A skin rash  · Recent exposure to someone else with strep bacteria  · Severe hoarseness and swollen glands in the neck or jaw   Date Last Reviewed: 8/1/2016  © 5680-4532 [employer]. [address] [place] [address], [place] [address]. All rights reserved. This information is not intended as a substitute for professional medical care. Always follow your healthcare professional's instructions.

## 2020-09-17 ENCOUNTER — OFFICE VISIT (OUTPATIENT)
Dept: OTOLARYNGOLOGY | Facility: CLINIC | Age: 25
End: 2020-09-17
Payer: COMMERCIAL

## 2020-09-17 VITALS
WEIGHT: 224.31 LBS | HEIGHT: 61 IN | TEMPERATURE: 99 F | DIASTOLIC BLOOD PRESSURE: 80 MMHG | BODY MASS INDEX: 42.35 KG/M2 | SYSTOLIC BLOOD PRESSURE: 130 MMHG

## 2020-09-17 DIAGNOSIS — J30.2 SEASONAL ALLERGIC RHINITIS, UNSPECIFIED TRIGGER: ICD-10-CM

## 2020-09-17 DIAGNOSIS — J35.8 TONSILLOLITH: ICD-10-CM

## 2020-09-17 DIAGNOSIS — J34.3 HYPERTROPHY OF INFERIOR NASAL TURBINATE: ICD-10-CM

## 2020-09-17 DIAGNOSIS — J35.01 CHRONIC TONSILLITIS: Primary | ICD-10-CM

## 2020-09-17 PROCEDURE — 99205 PR OFFICE/OUTPT VISIT, NEW, LEVL V, 60-74 MIN: ICD-10-PCS | Mod: S$GLB,,, | Performed by: OTOLARYNGOLOGY

## 2020-09-17 PROCEDURE — 3008F BODY MASS INDEX DOCD: CPT | Mod: CPTII,S$GLB,, | Performed by: OTOLARYNGOLOGY

## 2020-09-17 PROCEDURE — 99205 OFFICE O/P NEW HI 60 MIN: CPT | Mod: S$GLB,,, | Performed by: OTOLARYNGOLOGY

## 2020-09-17 PROCEDURE — 87070 CULTURE OTHR SPECIMN AEROBIC: CPT

## 2020-09-17 PROCEDURE — 3008F PR BODY MASS INDEX (BMI) DOCUMENTED: ICD-10-PCS | Mod: CPTII,S$GLB,, | Performed by: OTOLARYNGOLOGY

## 2020-09-17 PROCEDURE — 87075 CULTR BACTERIA EXCEPT BLOOD: CPT

## 2020-09-17 PROCEDURE — 87076 CULTURE ANAEROBE IDENT EACH: CPT

## 2020-09-17 NOTE — H&P (VIEW-ONLY)
OTOLARYNGOLOGY CLINIC NOTE  Date:  09/17/2020     Chief complaint:  Chief Complaint   Patient presents with    Other     Chronic Tonsillits/ with a Negative Strep       History of Present Illness  Karen Paredes is a 25 y.o. female  presenting today for a new evaluation and treatment of recurrent tonsil infections.   Every few months she has issue with throat infections.This has been going on for 6 years. She has at least 2-3 episodes per year. Usually has to get abx. +tonsil stones- pretty frequently that are quite bothersome.  She has had infections twice this year so far. No history of intratonsillar or peritonsillar abscess.     Has pain over nasal bridge, has difficulty breathing through nose, nasal passageway feels closed . Symptoms are all year round, not seasonal. No drainage from front of nose. She occasionally gets postnasal drip (PND) with tonsil swelling.     Has choking and awakening with sleep. + snoring. + nocturia. She does not feel well rested after a night of sleep. She does try to go to bed and wake up at same time daily. Has am headaches.    Has not had heartburn recently but has had in the past.     Currently she is having a little post nasal drip. Throat feels sore right now but feels like something might be coming on but not a full blown infection. She was seen at urgent care 9-16-20 , rapid strep was negative. COVID19 negative. Diagnosed with allergic rhinitis.     Past Medical History  Past Medical History:   Diagnosis Date    Obesity         Past Surgical History  Past Surgical History:   Procedure Laterality Date    NO PAST SURGERIES      wisdom teeth extraction    Medications  Current Outpatient Medications on File Prior to Visit   Medication Sig Dispense Refill    etonogestrel (NEXPLANON SDRM) by Subdermal route.       No current facility-administered medications on file prior to visit.        Review of Systems  Review of Systems   Constitutional: Negative for fever.   HENT: Positive  "for sore throat. Negative for ear pain.    Eyes: Negative for discharge.   Respiratory: Negative for cough.    Cardiovascular: Negative for palpitations.   Gastrointestinal: Negative for blood in stool and heartburn.   Genitourinary: Negative for hematuria.   Musculoskeletal: Negative for neck pain.   Skin: Negative for itching.   Neurological: Positive for headaches (in the am).   Endo/Heme/Allergies: Does not bruise/bleed easily.   Psychiatric/Behavioral: The patient does not have insomnia.         Social History   reports that she has never smoked. She has never used smokeless tobacco. She reports that she does not drink alcohol or use drugs.     Family History  Family History   Problem Relation Age of Onset    No Known Problems Mother     No Known Problems Father     Breast cancer Neg Hx     Colon cancer Neg Hx     Ovarian cancer Neg Hx     no family history of heart dz  No sickle cell disease nor trait  No bleeding/clotting disoerder    Physical Exam   Vitals:    09/17/20 0934   BP: 130/80   Temp: 98.9 °F (37.2 °C)    Body mass index is 42.38 kg/m².  Weight: 101.8 kg (224 lb 5.1 oz)   Height: 5' 1" (154.9 cm)     GENERAL: no acute distress.  HEAD: normocephalic.   SKIN: no lesions of skin of head and neck area.  EYES: lids and lashes normal. Pupils equal . No proptosis  EARS: external ear without lesion, normal pinna shape and position.  External auditory canal with normal cerumen, tympanic membrane fully visible, no perforation , no retraction. No middle ear effusion. Ossicles intact.   NOSE: external nose without deviation, allergic crease, septum grossly midline on anterior rhinoscopy. Bilateral turbinate hypertrophy- significant, no purulent drainage.   ORAL CAVITY/OROPHARYNX: dentition good. 3+ tonsils , extremely cryptic.  no mass or lesion of gingiva/buccal mucosa/floor of mouth/tongue. tongue midline and mobile. No fasciculations. Floor of mouth and base of tongue soft to palpation. Symmetric " palate rise. Uvula midline.   NECK: trachea midline. No discrete palpable thyroid nodule. No parotid mass nor tenderness. No submandibular gland mass nor tenderness. No scars.  LYMPH NODES:No cervical lymphadenopathy.  RESPIRATORY: no stridor, no stertor. Voice normal. Respirations nonlabored.  NEURO: alert, responds to questions appropriately.    Facial movement symmetric with good eye closure and symmetric smile.   PSYCH:mood appropriate      Imaging:  The patient does not have any pertinent and/or recent imaging of the head and neck.     Labs:rapid strep 9-16-20 and 6-15-19 negative. No throat culture has been done  CBC      BMP      COAGS        Assessment  1. Chronic tonsillitis  - Case Request Operating Room: TONSILLECTOMY, possible adenoidectomy  - COMPREHENSIVE METABOLIC PANEL; Future  - CBC auto differential; Future  - X-Ray Chest 1 View; Future  - POCT urine pregnancy  - EKG 12-lead; Future  - Aerobic culture  - Anaerobic culture    2. Tonsillolith    3. Hypertrophy of inferior nasal turbinate       Plan:  Discussed plan of care with patient in detail and all questions answered. Patient reported understanding of plan of care.   Allergic rhinitis/turbinate hypertrophy: start with nasal saline rinses for now ( gave handout with images of different types of saline rinse to patient)     Recurrent tonsillitis: some of throat irritation episodes may be due to allergic rhinitis. She has significant issues with tonsil stones as well and that itself is indication for tonsillectomy.  I also suspect some of her throat irritation symptoms may be due to AR or LPR which offered to treat now but she would like to proceed with tonsillectomy and treat any residual symptoms after     Swab of throat taken today for culture.     She has a lot of symptoms concerning for sleep apnea- will obtain sleep study after tonsillectomy if symptoms of this persist. I discussed this with her . We discussed getting test beforehand but she  would like to go ahead and get the surgery as she has taken off from work. Will monitor her closely in pacu and she may need to stay overnight if any concern for oxygen sat dropping or rebound pulm edema ( this is much less common in adult population compared to peds but can happen)    We discussed risks/ benefits/ indications/ alteranatives to tonsillectomy including but not limited to significant pain lasting for several weeks after surgery and difficulty swallowing.discussed with patient that tonsillectomy does not eliminate risk of getting infection or abscess 100%. Emphasized importance of hydration postop to avoid pain and bleeding. Bleeding risk is highest at 7-10 days postop and may require return to OR to stop bleeding. We discussed that pt will have bad breath and odd appearance in back of mouth ( like grey cottage cheese) while healing. We discussed that pt will need to be on soft diet while healing. There is a small risk of pulmonary edema and death with this surgery. We discussed that I give a small amount of narcotic medication postop but due to addictive potential of these medications, it is preferred to take ibuprofen and tylenol reserving narcotics for extreme pain that leads to inability to sleep.     I will evaluate her adenoid pad intraop and perform adenoidectomy if needed    Plan for tonsillectomy with possible adenoidectomy  10-1-20. preop testing ordered

## 2020-09-17 NOTE — LETTER
September 20, 2020      Gary Kim MD  [address] [facility] [place], [facility]  [address]  [place] [address]           [facility]  [address]  [place] [address]  Phone: [phone]          Patient: Karen Paredes   MR Number: [id number]   YOB: 1995   Date of Visit: 9/17/2020       Dear Dr. Gary Kim:    Thank you for referring Karen Paredes to me for evaluation. Attached you will find relevant portions of my assessment and plan of care.    If you have questions, please do not hesitate to call me. I look forward to following Karen Paredes along with you.    Sincerely,    Meenakshi Cannon MD    Enclosure  CC:  No Recipients    If you would like to receive this communication electronically, please contact [email or web address] or [phone] to request more information on [facility] access.    For providers and/or their staff who would like to refer a patient to [facility], please contact us through our one-stop-shop provider referral line, [facility], at [phone].    If you feel you have received this communication in error or would no longer like to receive these types of communications, please e-mail [email or web address]

## 2020-09-20 LAB — BACTERIA SPEC AEROBE CULT: NORMAL

## 2020-09-21 ENCOUNTER — APPOINTMENT (OUTPATIENT)
Dept: RADIOLOGY | Facility: HOSPITAL | Age: 25
End: 2020-09-21
Attending: OTOLARYNGOLOGY
Payer: COMMERCIAL

## 2020-09-21 DIAGNOSIS — J35.01 CHRONIC TONSILLITIS: ICD-10-CM

## 2020-09-21 PROCEDURE — 71046 X-RAY EXAM CHEST 2 VIEWS: CPT | Mod: TC,FY,PN

## 2020-09-21 PROCEDURE — 71046 XR CHEST PA AND LATERAL: ICD-10-PCS | Mod: 26,,, | Performed by: RADIOLOGY

## 2020-09-21 PROCEDURE — 71046 X-RAY EXAM CHEST 2 VIEWS: CPT | Mod: 26,,, | Performed by: RADIOLOGY

## 2020-09-24 ENCOUNTER — HOSPITAL ENCOUNTER (OUTPATIENT)
Dept: CARDIOLOGY | Facility: HOSPITAL | Age: 25
Discharge: HOME | End: 2020-09-24
Attending: OTOLARYNGOLOGY
Payer: COMMERCIAL

## 2020-09-24 DIAGNOSIS — J35.01 CHRONIC TONSILLITIS: ICD-10-CM

## 2020-09-24 LAB — BACTERIA SPEC ANAEROBE CULT: ABNORMAL

## 2020-09-24 PROCEDURE — 93005 ELECTROCARDIOGRAM TRACING: CPT

## 2020-09-24 PROCEDURE — 93010 ELECTROCARDIOGRAM REPORT: CPT | Mod: ,,, | Performed by: INTERNAL MEDICINE

## 2020-09-24 PROCEDURE — 93010 EKG 12-LEAD: ICD-10-PCS | Mod: ,,, | Performed by: INTERNAL MEDICINE

## 2020-09-25 ENCOUNTER — HOSPITAL ENCOUNTER (OUTPATIENT)
Dept: PREADMISSION TESTING | Facility: HOSPITAL | Age: 25
Discharge: HOME | End: 2020-09-25
Attending: OTOLARYNGOLOGY
Payer: COMMERCIAL

## 2020-09-25 VITALS
DIASTOLIC BLOOD PRESSURE: 84 MMHG | HEIGHT: 61 IN | RESPIRATION RATE: 18 BRPM | HEART RATE: 95 BPM | WEIGHT: 224 LBS | BODY MASS INDEX: 42.29 KG/M2 | OXYGEN SATURATION: 98 % | SYSTOLIC BLOOD PRESSURE: 130 MMHG

## 2020-09-25 NOTE — DISCHARGE INSTRUCTIONS
Your procedure  is scheduled for __10/1/2020________.    Call [phone] between 2pm and 5pm on _9/30/2020______to find out your arrival time for the day of surgery.    Report to the Emergency Department on the day of your surgery.  You will be escorted to the admitting unit.    Important instructions:   Do not eat or drink after 12 midnight, including water.  It is okay to brush your teeth.  Do not have gum, candy or mints.        Stop taking Aspirin, Ibuprofen, Motrin and Aleve , Fish oil, and Vitamin E for at least 7 days before your surgery. You may use Tylenol unless otherwise instructed by your doctor.          Return to patient registration through the Emergency Room as previously on___9/30/2020 at 8:50 am_ for  Covid test.     Please shower the night before and the morning of your surgery.         Female patients may be asked for a urine specimen on the morning of the surgery.  Please check with your nurse before using the restroom.     Do not wear make- up, including mascara.     You may wear deodorant only.      Do not wear powder, body lotion or perfume/cologne.     Do not wear any jewelry or have any metal on your body.     You will be asked to remove any dentures or partials for the procedure.     Please bring any documents given to you by your doctor.     If you are going home on the same day of surgery, you must arrange for a family member or a friend to drive you home.  Public transportation is prohibited.  You will not be able to drive home if you were given anesthesia or sedation.     Wear loose fitting clothes allowing for bandages.     Please leave money and valuables home.       You may bring your cell phone.     Call the doctor if fever or illness should occur before your surgery.    Call [phone] to contact us here if needed.

## 2020-09-30 ENCOUNTER — ANESTHESIA EVENT (OUTPATIENT)
Dept: SURGERY | Facility: HOSPITAL | Age: 25
End: 2020-09-30
Payer: COMMERCIAL

## 2020-09-30 ENCOUNTER — HOSPITAL ENCOUNTER (OUTPATIENT)
Dept: PREADMISSION TESTING | Facility: HOSPITAL | Age: 25
Discharge: HOME | End: 2020-09-30
Attending: OTOLARYNGOLOGY
Payer: COMMERCIAL

## 2020-09-30 DIAGNOSIS — Z01.818 PREOP TESTING: ICD-10-CM

## 2020-09-30 LAB
ABO + RH BLD: NORMAL
BLD GP AB SCN CELLS X3 SERPL QL: NORMAL
SARS-COV-2 RDRP RESP QL NAA+PROBE: NEGATIVE

## 2020-09-30 PROCEDURE — U0002 COVID-19 LAB TEST NON-CDC: HCPCS

## 2020-09-30 PROCEDURE — 36415 COLL VENOUS BLD VENIPUNCTURE: CPT

## 2020-09-30 PROCEDURE — 86850 RBC ANTIBODY SCREEN: CPT

## 2020-10-01 ENCOUNTER — HOSPITAL ENCOUNTER (OUTPATIENT)
Facility: HOSPITAL | Age: 25
Discharge: HOME | End: 2020-10-01
Attending: OTOLARYNGOLOGY | Admitting: OTOLARYNGOLOGY
Payer: COMMERCIAL

## 2020-10-01 ENCOUNTER — ANESTHESIA (OUTPATIENT)
Dept: SURGERY | Facility: HOSPITAL | Age: 25
End: 2020-10-01
Payer: COMMERCIAL

## 2020-10-01 VITALS
BODY MASS INDEX: 42.32 KG/M2 | RESPIRATION RATE: 18 BRPM | HEART RATE: 82 BPM | WEIGHT: 224 LBS | OXYGEN SATURATION: 99 % | DIASTOLIC BLOOD PRESSURE: 75 MMHG | SYSTOLIC BLOOD PRESSURE: 133 MMHG | TEMPERATURE: 98 F

## 2020-10-01 DIAGNOSIS — J03.90 TONSILLITIS: ICD-10-CM

## 2020-10-01 DIAGNOSIS — Z01.818 PREOP TESTING: Primary | ICD-10-CM

## 2020-10-01 LAB — B-HCG UR QL: NEGATIVE

## 2020-10-01 PROCEDURE — D9220A PRA ANESTHESIA: Mod: ANES,,, | Performed by: ANESTHESIOLOGY

## 2020-10-01 PROCEDURE — 36000707: Performed by: OTOLARYNGOLOGY

## 2020-10-01 PROCEDURE — 88304 TISSUE EXAM BY PATHOLOGIST: CPT | Mod: 26,,, | Performed by: PATHOLOGY

## 2020-10-01 PROCEDURE — 25000003 PHARM REV CODE 250: Performed by: OTOLARYNGOLOGY

## 2020-10-01 PROCEDURE — 42826 PR REMOVAL OF TONSILS,12+ Y/O: ICD-10-PCS | Mod: ,,, | Performed by: OTOLARYNGOLOGY

## 2020-10-01 PROCEDURE — 63600175 PHARM REV CODE 636 W HCPCS: Performed by: NURSE ANESTHETIST, CERTIFIED REGISTERED

## 2020-10-01 PROCEDURE — 42826 REMOVAL OF TONSILS: CPT | Mod: ,,, | Performed by: OTOLARYNGOLOGY

## 2020-10-01 PROCEDURE — 71000033 HC RECOVERY, INTIAL HOUR: Performed by: OTOLARYNGOLOGY

## 2020-10-01 PROCEDURE — 63600175 PHARM REV CODE 636 W HCPCS: Performed by: ANESTHESIOLOGY

## 2020-10-01 PROCEDURE — 37000009 HC ANESTHESIA EA ADD 15 MINS: Performed by: OTOLARYNGOLOGY

## 2020-10-01 PROCEDURE — 81025 URINE PREGNANCY TEST: CPT

## 2020-10-01 PROCEDURE — D9220A PRA ANESTHESIA: ICD-10-PCS | Mod: CRNA,,, | Performed by: NURSE ANESTHETIST, CERTIFIED REGISTERED

## 2020-10-01 PROCEDURE — 25000003 PHARM REV CODE 250: Performed by: NURSE ANESTHETIST, CERTIFIED REGISTERED

## 2020-10-01 PROCEDURE — 37000008 HC ANESTHESIA 1ST 15 MINUTES: Performed by: OTOLARYNGOLOGY

## 2020-10-01 PROCEDURE — 88304 PR  SURG PATH,LEVEL III: ICD-10-PCS | Mod: 26,,, | Performed by: PATHOLOGY

## 2020-10-01 PROCEDURE — D9220A PRA ANESTHESIA: Mod: CRNA,,, | Performed by: NURSE ANESTHETIST, CERTIFIED REGISTERED

## 2020-10-01 PROCEDURE — 36000706: Performed by: OTOLARYNGOLOGY

## 2020-10-01 PROCEDURE — 63600175 PHARM REV CODE 636 W HCPCS: Performed by: OTOLARYNGOLOGY

## 2020-10-01 PROCEDURE — 71000039 HC RECOVERY, EACH ADD'L HOUR: Performed by: OTOLARYNGOLOGY

## 2020-10-01 PROCEDURE — D9220A PRA ANESTHESIA: ICD-10-PCS | Mod: ANES,,, | Performed by: ANESTHESIOLOGY

## 2020-10-01 PROCEDURE — 88304 TISSUE EXAM BY PATHOLOGIST: CPT | Performed by: PATHOLOGY

## 2020-10-01 PROCEDURE — 71000016 HC POSTOP RECOV ADDL HR: Performed by: OTOLARYNGOLOGY

## 2020-10-01 PROCEDURE — 71000015 HC POSTOP RECOV 1ST HR: Performed by: OTOLARYNGOLOGY

## 2020-10-01 RX ORDER — LIDOCAINE HYDROCHLORIDE 10 MG/ML
1 INJECTION, SOLUTION EPIDURAL; INFILTRATION; INTRACAUDAL; PERINEURAL ONCE
Status: ACTIVE | OUTPATIENT
Start: 2020-10-01

## 2020-10-01 RX ORDER — SODIUM CHLORIDE 0.9 % (FLUSH) 0.9 %
10 SYRINGE (ML) INJECTION
Status: DISCONTINUED | OUTPATIENT
Start: 2020-10-01 | End: 2020-10-01 | Stop reason: HOSPADM

## 2020-10-01 RX ORDER — FENTANYL CITRATE 50 UG/ML
INJECTION, SOLUTION INTRAMUSCULAR; INTRAVENOUS
Status: DISCONTINUED | OUTPATIENT
Start: 2020-10-01 | End: 2020-10-01

## 2020-10-01 RX ORDER — HYDROCODONE BITARTRATE AND ACETAMINOPHEN 5; 325 MG/1; MG/1
1 TABLET ORAL EVERY 6 HOURS PRN
Qty: 25 TABLET | Refills: 0 | Status: SHIPPED | OUTPATIENT
Start: 2020-10-01 | End: 2020-10-01 | Stop reason: HOSPADM

## 2020-10-01 RX ORDER — SODIUM CHLORIDE 0.9 % (FLUSH) 0.9 %
10 SYRINGE (ML) INJECTION
Status: ACTIVE | OUTPATIENT
Start: 2020-10-01

## 2020-10-01 RX ORDER — HYDROCODONE BITARTRATE AND ACETAMINOPHEN 7.5; 325 MG/15ML; MG/15ML
15 SOLUTION ORAL EVERY 4 HOURS PRN
Status: DISCONTINUED | OUTPATIENT
Start: 2020-10-01 | End: 2020-10-01 | Stop reason: HOSPADM

## 2020-10-01 RX ORDER — LIDOCAINE HYDROCHLORIDE 10 MG/ML
1 INJECTION, SOLUTION EPIDURAL; INFILTRATION; INTRACAUDAL; PERINEURAL ONCE
Status: DISCONTINUED | OUTPATIENT
Start: 2020-10-01 | End: 2020-10-01 | Stop reason: HOSPADM

## 2020-10-01 RX ORDER — SODIUM CHLORIDE, SODIUM LACTATE, POTASSIUM CHLORIDE, CALCIUM CHLORIDE 600; 310; 30; 20 MG/100ML; MG/100ML; MG/100ML; MG/100ML
INJECTION, SOLUTION INTRAVENOUS CONTINUOUS
Status: DISCONTINUED | OUTPATIENT
Start: 2020-10-01 | End: 2020-10-01 | Stop reason: HOSPADM

## 2020-10-01 RX ORDER — HYDROMORPHONE HYDROCHLORIDE 2 MG/ML
0.2 INJECTION, SOLUTION INTRAMUSCULAR; INTRAVENOUS; SUBCUTANEOUS EVERY 5 MIN PRN
Status: COMPLETED | OUTPATIENT
Start: 2020-10-01 | End: 2020-10-01

## 2020-10-01 RX ORDER — ROCURONIUM BROMIDE 10 MG/ML
INJECTION, SOLUTION INTRAVENOUS
Status: DISCONTINUED | OUTPATIENT
Start: 2020-10-01 | End: 2020-10-01

## 2020-10-01 RX ORDER — NEOSTIGMINE METHYLSULFATE 1 MG/ML
INJECTION, SOLUTION INTRAVENOUS
Status: DISCONTINUED | OUTPATIENT
Start: 2020-10-01 | End: 2020-10-01

## 2020-10-01 RX ORDER — ACETAMINOPHEN 10 MG/ML
INJECTION, SOLUTION INTRAVENOUS
Status: DISCONTINUED | OUTPATIENT
Start: 2020-10-01 | End: 2020-10-01

## 2020-10-01 RX ORDER — HYDROCODONE BITARTRATE AND ACETAMINOPHEN 7.5; 325 MG/15ML; MG/15ML
10 SOLUTION ORAL EVERY 6 HOURS PRN
Qty: 300 ML | Refills: 0 | Status: SHIPPED | OUTPATIENT
Start: 2020-10-01 | End: 2020-10-08

## 2020-10-01 RX ORDER — ONDANSETRON 2 MG/ML
INJECTION INTRAMUSCULAR; INTRAVENOUS
Status: DISCONTINUED | OUTPATIENT
Start: 2020-10-01 | End: 2020-10-01

## 2020-10-01 RX ORDER — SUCCINYLCHOLINE CHLORIDE 20 MG/ML
INJECTION INTRAMUSCULAR; INTRAVENOUS
Status: DISCONTINUED | OUTPATIENT
Start: 2020-10-01 | End: 2020-10-01

## 2020-10-01 RX ORDER — ACETAMINOPHEN 10 MG/ML
INJECTION, SOLUTION INTRAVENOUS
Status: COMPLETED
Start: 2020-10-01 | End: 2020-10-01

## 2020-10-01 RX ORDER — PROPOFOL 10 MG/ML
VIAL (ML) INTRAVENOUS
Status: DISCONTINUED | OUTPATIENT
Start: 2020-10-01 | End: 2020-10-01

## 2020-10-01 RX ORDER — MIDAZOLAM HYDROCHLORIDE 1 MG/ML
INJECTION, SOLUTION INTRAMUSCULAR; INTRAVENOUS
Status: DISCONTINUED | OUTPATIENT
Start: 2020-10-01 | End: 2020-10-01

## 2020-10-01 RX ORDER — LIDOCAINE HCL/PF 100 MG/5ML
SYRINGE (ML) INTRAVENOUS
Status: DISCONTINUED | OUTPATIENT
Start: 2020-10-01 | End: 2020-10-01

## 2020-10-01 RX ADMIN — SODIUM CHLORIDE, SODIUM LACTATE, POTASSIUM CHLORIDE, AND CALCIUM CHLORIDE: .6; .31; .03; .02 INJECTION, SOLUTION INTRAVENOUS at 12:10

## 2020-10-01 RX ADMIN — ONDANSETRON 4 MG: 2 INJECTION, SOLUTION INTRAMUSCULAR; INTRAVENOUS at 09:10

## 2020-10-01 RX ADMIN — HYDROMORPHONE HYDROCHLORIDE 0.2 MG: 2 INJECTION INTRAMUSCULAR; INTRAVENOUS; SUBCUTANEOUS at 11:10

## 2020-10-01 RX ADMIN — ROCURONIUM BROMIDE 20 MG: 10 INJECTION, SOLUTION INTRAVENOUS at 09:10

## 2020-10-01 RX ADMIN — HYDROCODONE BITARTRATE AND ACETAMINOPHEN 15 ML: 7.5; 325 SOLUTION ORAL at 01:10

## 2020-10-01 RX ADMIN — PROPOFOL 200 MG: 10 INJECTION, EMULSION INTRAVENOUS at 09:10

## 2020-10-01 RX ADMIN — FENTANYL CITRATE 50 MCG: 50 INJECTION INTRAMUSCULAR; INTRAVENOUS at 09:10

## 2020-10-01 RX ADMIN — GLYCOPYRROLATE 0.6 MG: 0.2 INJECTION, SOLUTION INTRAMUSCULAR; INTRAVITREAL at 10:10

## 2020-10-01 RX ADMIN — LIDOCAINE HYDROCHLORIDE 100 MG: 20 INJECTION, SOLUTION INTRAVENOUS at 09:10

## 2020-10-01 RX ADMIN — GLYCOPYRROLATE 0.1 MG: 0.2 INJECTION, SOLUTION INTRAMUSCULAR; INTRAVITREAL at 10:10

## 2020-10-01 RX ADMIN — HYDROMORPHONE HYDROCHLORIDE 0.2 MG: 2 INJECTION INTRAMUSCULAR; INTRAVENOUS; SUBCUTANEOUS at 10:10

## 2020-10-01 RX ADMIN — SODIUM CHLORIDE, SODIUM LACTATE, POTASSIUM CHLORIDE, AND CALCIUM CHLORIDE: .6; .31; .03; .02 INJECTION, SOLUTION INTRAVENOUS at 08:10

## 2020-10-01 RX ADMIN — ROCURONIUM BROMIDE 15 MG: 10 INJECTION, SOLUTION INTRAVENOUS at 10:10

## 2020-10-01 RX ADMIN — SUCCINYLCHOLINE CHLORIDE 120 MG: 20 INJECTION, SOLUTION INTRAMUSCULAR; INTRAVENOUS at 09:10

## 2020-10-01 RX ADMIN — AMPICILLIN SODIUM AND SULBACTAM SODIUM 3 G: 2; 1 INJECTION, POWDER, FOR SOLUTION INTRAMUSCULAR; INTRAVENOUS at 09:10

## 2020-10-01 RX ADMIN — MIDAZOLAM HYDROCHLORIDE 2 MG: 1 INJECTION, SOLUTION INTRAMUSCULAR; INTRAVENOUS at 09:10

## 2020-10-01 RX ADMIN — FENTANYL CITRATE 50 MCG: 50 INJECTION INTRAMUSCULAR; INTRAVENOUS at 10:10

## 2020-10-01 RX ADMIN — NEOSTIGMINE METHYLSULFATE 4 MG: 1 INJECTION INTRAVENOUS at 10:10

## 2020-10-01 RX ADMIN — ACETAMINOPHEN 1000 MG: 10 INJECTION, SOLUTION INTRAVENOUS at 09:10

## 2020-10-01 NOTE — INTERVAL H&P NOTE
The patient has been examined and the H&P has been reviewed:    I concur with the findings and no changes have occurred since H&P was written.    Surgery risks, benefits and alternative options discussed and understood by patient/family.          Active Hospital Problems    Diagnosis  POA    Tonsillitis [J03.90]  Yes      Resolved Hospital Problems   No resolved problems to display.

## 2020-10-01 NOTE — OP NOTE
[facility]  Surgery Department  Operative Note    SUMMARY     Date of Procedure: 10/1/2020     Procedure: Procedure(s) (LRB):  TONSILLECTOMY (Bilateral)     Surgeon(s) and Role:     * Meenakshi Cannon MD - Primary    Assisting Surgeon: None    Pre-Operative Diagnosis: Chronic tonsillitis [J35.01]    Post-Operative Diagnosis: Post-Op Diagnosis Codes:     * Chronic tonsillitis [J35.01]    Anesthesia: General    Technical Procedures Used: bilateral tonsillectomy, examination of nasopharynx     Description of the Findings of the Procedure: no adenoid hypertrophy, 3-4+ tonsils with significant scar tissue. Good hemostasis with no bleeding after valsalva    Operative report in detail: The patient was identified in the holding area per routine. She was taken to the operating room and placed supine on the operating table. The patient was intubated transorally by the anesthesiology service, and an adequate level of general endotracheal anesthesia was achieved. The head of the patient's bed was rotated 90 degrees away from anesthesia. her eyes were protected with tape. The patient was draped in the standard fashion for tonsillectomy, and a timeout was performed and the correct patient and procedure were identified.      The Manuel Miguel Angel mouth gag  was inserted in the patient's oral cavity and suspended carefully from the [place] stand for visualization of the oropharynx. FiO2 of less than 30% was confirmed with anesthesiology provider.  We began with the right tonsil which was grasped using a curved Allis clamp. Bovie electrocautery was used to identify the superior tonsil pillar and fascial plane and and incision was made superiorly extending inferiorly to the end of the tonsil. The fascial planed was followed from a superior to inferior level carefully to remove the tonsil from the fossa avoiding intramuscular incision. The inferior portion of the tonsil was enlarged and connecting to the lingual tonsils  however a good plane was achieved to allow for removal of the tonsil specimen. Once the tonsil was removed, a cotton ball was placed in the fossa to provide pressure hemostasis. Suction bovie cautery was then used to cauterize any areas that were bleeding , identifying these areas carefully using a heard retractor and laryngeal mirror. The anmol jenny mouth gag was let down for a moment to avoid excessive pressure on the tongue and then resuspended as before.  The same procedure was then completed with the left tonsil. Next a red rubber catheter was inserted into the nostril to allow for elevation of the soft palate and examination of the nasopharynx. A laryngeal mirror was used for this and it was noted that the patient had no adenoid hypertrophy. Removal was not indicated .  The nose and throat were irrigated with saline and a Valsalva maneuver was performed . There was excellent hemostasis. The patient's stomach was then suctioned using an orogastric tube.  This concluded the procedure and the patient was handed back over to anesthesia and extubated without complication.       Significant Surgical Tasks Conducted by the Assistant(s), if Applicable: none    Complications: No    Estimated Blood Loss (EBL): less than 15 cc             Specimens:   Bilateral tonsils           Condition: Good    Disposition: PACU - hemodynamically stable.    Attestation: I was present and scrubbed for the entire procedure.

## 2020-10-01 NOTE — ANESTHESIA PREPROCEDURE EVALUATION
10/01/2020  Karen Paredes is a 25 y.o., female.    Anesthesia Evaluation    I have reviewed the Patient Summary Reports.    I have reviewed the Nursing Notes.    I have reviewed the Medications.     Review of Systems  Anesthesia Hx:  No problems with previous Anesthesia Denies Hx of Anesthetic complications  Neg history of prior surgery. Denies Family Hx of Anesthesia complications.   Denies Personal Hx of Anesthesia complications.   Social:  Non-Smoker, No Alcohol Use    Hematology/Oncology:  Hematology Normal   Oncology Normal     EENT/Dental:EENT/Dental Normal   Cardiovascular:  Cardiovascular Normal Exercise tolerance: good     Pulmonary:  Pulmonary Normal    Renal/:  Renal/ Normal     Hepatic/GI:  Hepatic/GI Normal    Musculoskeletal:  Musculoskeletal Normal    Neurological:  Neurology Normal    Endocrine:  Endocrine Normal    Dermatological:  Skin Normal    Psych:  Psychiatric Normal           Physical Exam  General:  Well nourished    Airway/Jaw/Neck:  Airway Findings: Mouth Opening: Normal General Airway Assessment: Adult  Mallampati: II  TM Distance: Normal, at least 6 cm         Dental:  DENTAL FINDINGS: Normal   Chest/Lungs:  Chest/Lungs Clear    Heart/Vascular:  Heart Findings: Normal Heart murmur: negative       Mental Status:  Mental Status Findings:  Cooperative, Alert and Oriented         Anesthesia Plan  Type of Anesthesia, risks & benefits discussed:  Anesthesia Type:  spinal, general  Patient's Preference:   Intra-op Monitoring Plan:   Intra-op Monitoring Plan Comments:   Post Op Pain Control Plan:   Post Op Pain Control Plan Comments:   Induction:   IV  Beta Blocker:  Patient is not currently on a Beta-Blocker (No further documentation required).       Informed Consent: Patient understands risks and agrees with Anesthesia plan.  Questions answered. Anesthesia consent signed with  patient.  ASA Score: 2     Day of Surgery Review of History & Physical:            Ready For Surgery From Anesthesia Perspective.

## 2020-10-01 NOTE — PLAN OF CARE
Pacu plan of care note. Patient alert . VSS. Teresita 10/10. r. Dressing clean, dry and intact.Rates pain as 4/10 Safety precautions maintained. Ready for transfer to same day surgery unit via stretcher and Nurse transport.

## 2020-10-01 NOTE — BRIEF OP NOTE
[facility]  Brief Operative Note    Surgery Date: 10/1/2020     Surgeon(s) and Role:     * Meenakshi Cannon MD - Primary    Assisting Surgeon: None    Pre-op Diagnosis:  Chronic tonsillitis [J35.01]    Post-op Diagnosis:  Post-Op Diagnosis Codes:     * Chronic tonsillitis [J35.01]    Procedure(s) (LRB):  TONSILLECTOMY (Bilateral)    Anesthesia: General    Description of the findings of the procedure(s): large tonsils, symmetric with lots of scar tissue   No adenoid hypertrophy- adenoidectomy not needed.     Estimated Blood Loss: 10 cc         Specimens: bilateral tonsils      Discharge Note    OUTCOME: Patient tolerated treatment/procedure well without complication and will be monitored in the pacu and discharged when meets criteria.    DISPOSITION: Home or Self Care    FINAL DIAGNOSIS:  Chronic tonsillitis  FOLLOWUP: In clinic    DISCHARGE INSTRUCTIONS: soft diet, call if any bleeding

## 2020-10-01 NOTE — TRANSFER OF CARE
Anesthesia Transfer of Care Note    Patient: Karen Paredes    Procedure(s) Performed: Procedure(s) (LRB):  TONSILLECTOMY (Bilateral)    Patient location: PACU    Anesthesia Type: general    Transport from OR: Transported from OR on room air with adequate spontaneous ventilation    Post pain: adequate analgesia    Post assessment: no apparent anesthetic complications and tolerated procedure well    Post vital signs: stable    Level of consciousness: awake    Nausea/Vomiting: no nausea/vomiting    Complications: none    Transfer of care protocol was followed      Last vitals:   Visit Vitals  BP (!) 151/86 (BP Location: Right arm, Patient Position: Lying)   Pulse 104   Temp 36.6 °C (97.9 °F) (Oral)   Resp 15   Wt 101.6 kg (223 lb 15.8 oz)   LMP 09/01/2020 (Approximate)   SpO2 100%   Breastfeeding No   BMI 42.32 kg/m²

## 2020-10-01 NOTE — DISCHARGE INSTRUCTIONS
Adult Tonsillectomy  The tonsils are 2 small masses of tissue at the back of the throat. They are part of the bodys immune system. This helps the body fight disease. In some people, the tonsils become infected or enlarged. This can cause severe sore throats, snoring, or other problems. Tonsillectomy is surgery to remove the tonsils. Tonsillectomy may be recommended if you have obstruction causing sleep apnea, or recurring, chronic, or severe infections. This sheet tells you more about this surgery and what to expect.            Recovering at home  It will likely take about 3 weeks to heal from the surgery. During your recovery:  · Expect to have throat pain. You may also feel pain in your ears. This is referred pain from the throat, and is normal. Your post-surgery pain may come and go. It may be worse on the 4th or 5th day after surgery.    · Take pain medicine as directed. You can alternate ibuprofen ( 400 mg) and tylenol ( 325 mg ) every 6 hours such that you are taking something every 3 hours. Try to avoid narcotic medication if possible . If you need the narcotic medication instead of the tylenol dose as the narcotic has tylenol in it.   · Do not drive while you are on opioid or narcotic pain medicine. Expect to feel sleepy or dizzy while you are taking this medicine.  · Do not use ibuprofen or aspirin for 14 days after surgery unless your healthcare provider says its OK. You may use acetaminophen as directed.  · Use 2 or 3 pillows under your head while resting. This will help keep swelling down.  · Drink lots of chilled liquids. Water, noncitrus juices, and frozen juice bars are good choices.  · Eat cold foods and soft foods, which are easiest to swallow. Try foods like ice cream, gelatin, scrambled eggs, pasta, and mashed potatoes.  · Avoid foods that require a lot of chewing. Also avoid foods that may scratch the throat, like toast or potato chips. Avoid hot, spicy, or acidic foods.  · Avoid strenuous  activity for 2 to 3 weeks after surgery.  · Be aware that white patches will form in the throat during healing. These are scabs and are not a sign of infection. The patches will come off in 1 to 2 weeks and may cause bleeding. To minimize bleeding, drink lots of fluids. Gargling with cold water can help.  Call the healthcare provider  Call your healthcare provider if you have any of the following:  · Chest pain or trouble breathing (call 911)  · Fever of 100.4°F (38°C) or higher, or as directed by your healthcare provider  · Bright red bleeding from the mouth or nose  · Severe pain not relieved by medicine  · Signs of dehydration (dark urine, urinating less often)  · Heavy or persistent bleeding in the throat at any time  · Other signs or symptoms as indicated by your healthcare provider   Follow-up  Schedule a follow-up visit with your healthcare provider as advised. During this visit, the healthcare provider will make sure you are healing well. Ask any questions you have about the surgery or your recovery.  Risks and possible complications   Risks of this surgery include:  · Infection  · Bleeding  · Injury to the lips or teeth  · Painful swallowing during recovery  · The need for a second surgery  · Risks of anesthesia    Date Last Reviewed: 6/11/2015  © 0922-2282 [email or web address]. [address] [place] [address], [place] [address]. All rights reserved. This information is not intended as a substitute for professional medical care. Always follow your healthcare professional's instructions.      Fall Prevention  Millions of people fall every year and injure themselves. You may have had anesthesia or sedation which may increase your risk of falling. You may have health issues that put you at an increased risk of falling.     Here are ways to reduce your risk of falling.  ·   · Make your home safe by keeping walkways clear of objects you may trip over.  · Use non-slip pads under rugs. Do not use area rugs or small  throw rugs.  · Use non-slip mats in bathtubs and showers.  · Install handrails and lights on staircases.  · Do not walk in poorly lit areas.  · Do not stand on chairs or wobbly ladders.  · Use caution when reaching overhead or looking upward. This position can cause a loss of balance.  · Be sure your shoes fit properly, have non-slip bottoms and are in good condition.   · Wear shoes both inside and out. Avoid going barefoot or wearing slippers.  · Be cautious when going up and down stairs, curbs, and when walking on uneven sidewalks.  · If your balance is poor, consider using a cane or walker.  · If your fall was related to alcohol use, stop or limit alcohol intake.   · If your fall was related to use of sleeping medicines, talk to your doctor about this. You may need to reduce your dosage at bedtime if you awaken during the night to go to the bathroom.    · To reduce the need for nighttime bathroom trips:  ¨ Avoid drinking fluids for several hours before going to bed  ¨ Empty your bladder before going to bed  ¨ Men can keep a urinal at the bedside  · Stay as active as you can. Balance, flexibility, strength, and endurance all come from exercise. They all play a role in preventing falls. Ask your healthcare provider which types of activity are right for you.  · Get your vision checked on a regular basis.  · If you have pets, know where they are before you stand up or walk so you don't trip over them.  Use night lights.

## 2020-10-02 LAB
FINAL PATHOLOGIC DIAGNOSIS: NORMAL
GROSS: NORMAL
Lab: NORMAL

## 2020-10-02 NOTE — ANESTHESIA POSTPROCEDURE EVALUATION
Anesthesia Post Evaluation    Patient: Karen Paredes    Procedure(s) Performed: Procedure(s) (LRB):  TONSILLECTOMY (Bilateral)    Final Anesthesia Type: general    Patient location during evaluation: PACU  Patient participation: Yes- Able to Participate  Level of consciousness: awake and alert, oriented and awake  Post-procedure vital signs: reviewed and stable  Airway patency: patent    PONV status at discharge: No PONV  Anesthetic complications: no      Cardiovascular status: blood pressure returned to baseline  Respiratory status: unassisted, spontaneous ventilation and room air  Hydration status: euvolemic  Follow-up not needed.          Vitals Value Taken Time   /75 10/01/20 1436   Temp 36.7 °C (98 °F) 10/01/20 1436   Pulse 82 10/01/20 1436   Resp 18 10/01/20 1436   SpO2 99 % 10/01/20 1436         Event Time   Out of Recovery 12:02:30         Pain/Teresita Score: Pain Rating Prior to Med Admin: 7 (10/1/2020  1:12 PM)  Pain Rating Post Med Admin: 4 (10/1/2020 11:58 AM)  Teresita Score: 10 (10/1/2020  2:36 PM)  Modified Teresita Score: 20 (10/1/2020  2:36 PM)

## 2020-10-06 ENCOUNTER — PATIENT MESSAGE (OUTPATIENT)
Dept: OTOLARYNGOLOGY | Facility: CLINIC | Age: 25
End: 2020-10-06

## 2020-10-08 ENCOUNTER — OFFICE VISIT (OUTPATIENT)
Dept: OTOLARYNGOLOGY | Facility: CLINIC | Age: 25
End: 2020-10-08
Payer: COMMERCIAL

## 2020-10-08 VITALS
BODY MASS INDEX: 42.32 KG/M2 | DIASTOLIC BLOOD PRESSURE: 70 MMHG | TEMPERATURE: 99 F | HEIGHT: 61 IN | SYSTOLIC BLOOD PRESSURE: 120 MMHG

## 2020-10-08 DIAGNOSIS — Z90.89 STATUS POST TONSILLECTOMY: Primary | ICD-10-CM

## 2020-10-08 PROCEDURE — 99024 PR POST-OP FOLLOW-UP VISIT: ICD-10-PCS | Mod: S$GLB,,, | Performed by: OTOLARYNGOLOGY

## 2020-10-08 PROCEDURE — 99024 POSTOP FOLLOW-UP VISIT: CPT | Mod: S$GLB,,, | Performed by: OTOLARYNGOLOGY

## 2020-10-08 RX ORDER — HYDROCODONE BITARTRATE AND ACETAMINOPHEN 7.5; 325 MG/15ML; MG/15ML
10 SOLUTION ORAL EVERY 6 HOURS PRN
Qty: 200 ML | Refills: 0 | Status: SHIPPED | OUTPATIENT
Start: 2020-10-08 | End: 2021-12-29

## 2020-10-08 RX ORDER — ACETAMINOPHEN 160 MG/5ML
325 LIQUID ORAL EVERY 6 HOURS PRN
Qty: 473 ML | Refills: 2 | Status: SHIPPED | OUTPATIENT
Start: 2020-10-08 | End: 2020-10-22

## 2020-10-08 RX ORDER — TRIPROLIDINE/PSEUDOEPHEDRINE 2.5MG-60MG
20 TABLET ORAL EVERY 6 HOURS PRN
Qty: 473 ML | Refills: 2 | Status: SHIPPED | OUTPATIENT
Start: 2020-10-08 | End: 2020-10-22

## 2020-10-08 NOTE — PATIENT INSTRUCTIONS
Take ibuprofen every 6 hours and take tylenol every 6 hours so that you can take something every 3 hours. Do this on schedule for the next 4-5 days. After that can try to go off a schedule and do every 6 hours as needed.      Ok to swap out the tylenol for the prescription pain medication as needed. Try to wean off the prescription pain medication    Continue soft diet for 1 week    Try to drink a lot of fluids 30 minutes after you take pain medication.    Be cautious with gargling in mouth.

## 2020-10-08 NOTE — PROGRESS NOTES
ENT POSTOP CLINIC NOTE    SUBJECTIVE:   pt here for postop visit s/p tonsillectomy on 10-1 -20. This was performed for recurrent and chronic tonsillitis. Doing pretty well since procedure but having a lot of pain. She can only take liquid medicine and therefore has only been using narcotic med. She has been having to sleep elevated on pillows to help with her breathing. She is drinking fluids and eating soft diet. She has not had any bleeding. Has had some ear discomfort and pain in neck . This is improving. + snoring    OBJECTIVE:  General: no acute distress  Head: normocephalic  Oral cavity: well healing tonsillar fossa with fibrinous material as expected for healing. No bleeding nor clots.   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  Snoring, possible FEI    PLAN:  Discussed pain medication regimen and sent rx for ibuprofen and tylenol liquid forms as well as additional narcotic. Needs to wean off of narcotic and gave patient regimen for this ( written and orally reviewed)  Encouraged extensive po 30 minutes after takes pain meds  Counseled that risk of bleeding will be highest this week. Needs to stay off of work to avoid talking on phone all day which can lead to dryness and increase bleeding risk.   [place] paperwork filled out  F/u 3-4 weeks for one more postop check to ensure swallowing etc improved and doing ok. Can be with me or NP. If no issues at follow up, can follow up prn.  If persists with snoring and sleep apnea symptoms after heals completely, recommend workup with sleep study/pulm referral

## 2020-11-02 ENCOUNTER — PATIENT MESSAGE (OUTPATIENT)
Dept: OTOLARYNGOLOGY | Facility: CLINIC | Age: 25
End: 2020-11-02

## 2020-11-05 ENCOUNTER — OFFICE VISIT (OUTPATIENT)
Dept: OTOLARYNGOLOGY | Facility: CLINIC | Age: 25
End: 2020-11-05
Payer: COMMERCIAL

## 2020-11-05 ENCOUNTER — PATIENT MESSAGE (OUTPATIENT)
Dept: OTOLARYNGOLOGY | Facility: CLINIC | Age: 25
End: 2020-11-05

## 2020-11-05 VITALS
BODY MASS INDEX: 43.23 KG/M2 | HEIGHT: 61 IN | TEMPERATURE: 98 F | DIASTOLIC BLOOD PRESSURE: 78 MMHG | SYSTOLIC BLOOD PRESSURE: 114 MMHG | WEIGHT: 228.94 LBS

## 2020-11-05 DIAGNOSIS — Z90.89 STATUS POST TONSILLECTOMY: Primary | ICD-10-CM

## 2020-11-05 PROCEDURE — 99024 PR POST-OP FOLLOW-UP VISIT: ICD-10-PCS | Mod: S$GLB,,, | Performed by: NURSE PRACTITIONER

## 2020-11-05 PROCEDURE — 3008F PR BODY MASS INDEX (BMI) DOCUMENTED: ICD-10-PCS | Mod: CPTII,S$GLB,, | Performed by: NURSE PRACTITIONER

## 2020-11-05 PROCEDURE — 99024 POSTOP FOLLOW-UP VISIT: CPT | Mod: S$GLB,,, | Performed by: NURSE PRACTITIONER

## 2020-11-05 PROCEDURE — 3008F BODY MASS INDEX DOCD: CPT | Mod: CPTII,S$GLB,, | Performed by: NURSE PRACTITIONER

## 2020-11-05 NOTE — PROGRESS NOTES
SUBJECTIVE:  Karen is here for a 4 week postop visit s/p tonsillectomy on 10/1/2020, performed for recurrent and chronic tonsillitis. She is doing much better, returned to work yesterday. No longer sleeping elevated but thinks she may go back to that because when lying flat she feels that she changes positions often due to discomfort in her neck. Pain is much better, only taking Tylenol prn, her left side is sometimes painful when lying down and when waking in the morning. Ear pain improving. She is eating and drinking without difficulty, states she drinks water all day long because her throat is dry all of the time. She has not had any bleeding.  + snoring     OBJECTIVE:  Constitutional: Vital signs are normal. She appears well-developed. She is cooperative. Normal speech.    General: no acute distress  Head: normocephalic  Oral cavity: well healing tonsillar fossa.  No bleeding or clots noted.   Respiratory: nonlabored , no stridor or stertor        PLAN:  Patient has returned to work and is talking on the phone a lot, patient advised to stay hydrated and to monitor for any bleeding.   Patient is swallowing well now, follow up prn.   If snoring and sleep apnea symptoms persist after completely healed, recommend workup with sleep study/pulm referral. Pat will call if she would like to pursue this.   Patient instructed to contact me or Dr. Cannon with questions/concerns and worsening/ new symptoms.    Return to work paperwork completed.

## 2020-11-09 ENCOUNTER — DOCUMENTATION ONLY (OUTPATIENT)
Dept: OTOLARYNGOLOGY | Facility: CLINIC | Age: 25
End: 2020-11-09

## 2021-08-18 ENCOUNTER — OFFICE VISIT (OUTPATIENT)
Dept: INTERNAL MEDICINE | Facility: CLINIC | Age: 26
End: 2021-08-18
Payer: COMMERCIAL

## 2021-08-18 VITALS
DIASTOLIC BLOOD PRESSURE: 72 MMHG | HEART RATE: 90 BPM | RESPIRATION RATE: 16 BRPM | OXYGEN SATURATION: 99 % | SYSTOLIC BLOOD PRESSURE: 124 MMHG | WEIGHT: 231.5 LBS | HEIGHT: 61 IN | BODY MASS INDEX: 43.71 KG/M2

## 2021-08-18 DIAGNOSIS — R05.9 COUGH: ICD-10-CM

## 2021-08-18 DIAGNOSIS — Z20.822 SUSPECTED COVID-19 VIRUS INFECTION: Primary | ICD-10-CM

## 2021-08-18 PROCEDURE — 3074F PR MOST RECENT SYSTOLIC BLOOD PRESSURE < 130 MM HG: ICD-10-PCS | Mod: CPTII,S$GLB,, | Performed by: INTERNAL MEDICINE

## 2021-08-18 PROCEDURE — 1159F PR MEDICATION LIST DOCUMENTED IN MEDICAL RECORD: ICD-10-PCS | Mod: CPTII,S$GLB,, | Performed by: INTERNAL MEDICINE

## 2021-08-18 PROCEDURE — U0005 INFEC AGEN DETEC AMPLI PROBE: HCPCS | Performed by: INTERNAL MEDICINE

## 2021-08-18 PROCEDURE — 99999 PR PBB SHADOW E&M-EST. PATIENT-LVL III: ICD-10-PCS | Mod: PBBFAC,,, | Performed by: INTERNAL MEDICINE

## 2021-08-18 PROCEDURE — 3074F SYST BP LT 130 MM HG: CPT | Mod: CPTII,S$GLB,, | Performed by: INTERNAL MEDICINE

## 2021-08-18 PROCEDURE — 99204 PR OFFICE/OUTPT VISIT, NEW, LEVL IV, 45-59 MIN: ICD-10-PCS | Mod: S$GLB,,, | Performed by: INTERNAL MEDICINE

## 2021-08-18 PROCEDURE — U0003 INFECTIOUS AGENT DETECTION BY NUCLEIC ACID (DNA OR RNA); SEVERE ACUTE RESPIRATORY SYNDROME CORONAVIRUS 2 (SARS-COV-2) (CORONAVIRUS DISEASE [COVID-19]), AMPLIFIED PROBE TECHNIQUE, MAKING USE OF HIGH THROUGHPUT TECHNOLOGIES AS DESCRIBED BY CMS-2020-01-R: HCPCS | Performed by: INTERNAL MEDICINE

## 2021-08-18 PROCEDURE — 99999 PR PBB SHADOW E&M-EST. PATIENT-LVL III: CPT | Mod: PBBFAC,,, | Performed by: INTERNAL MEDICINE

## 2021-08-18 PROCEDURE — 3078F DIAST BP <80 MM HG: CPT | Mod: CPTII,S$GLB,, | Performed by: INTERNAL MEDICINE

## 2021-08-18 PROCEDURE — 1159F MED LIST DOCD IN RCRD: CPT | Mod: CPTII,S$GLB,, | Performed by: INTERNAL MEDICINE

## 2021-08-18 PROCEDURE — 99204 OFFICE O/P NEW MOD 45 MIN: CPT | Mod: S$GLB,,, | Performed by: INTERNAL MEDICINE

## 2021-08-18 PROCEDURE — 3008F PR BODY MASS INDEX (BMI) DOCUMENTED: ICD-10-PCS | Mod: CPTII,S$GLB,, | Performed by: INTERNAL MEDICINE

## 2021-08-18 PROCEDURE — 1160F RVW MEDS BY RX/DR IN RCRD: CPT | Mod: CPTII,S$GLB,, | Performed by: INTERNAL MEDICINE

## 2021-08-18 PROCEDURE — 3008F BODY MASS INDEX DOCD: CPT | Mod: CPTII,S$GLB,, | Performed by: INTERNAL MEDICINE

## 2021-08-18 PROCEDURE — 3078F PR MOST RECENT DIASTOLIC BLOOD PRESSURE < 80 MM HG: ICD-10-PCS | Mod: CPTII,S$GLB,, | Performed by: INTERNAL MEDICINE

## 2021-08-18 PROCEDURE — 1160F PR REVIEW ALL MEDS BY PRESCRIBER/CLIN PHARMACIST DOCUMENTED: ICD-10-PCS | Mod: CPTII,S$GLB,, | Performed by: INTERNAL MEDICINE

## 2021-08-19 LAB
SARS-COV-2 RNA RESP QL NAA+PROBE: NOT DETECTED
SARS-COV-2- CYCLE NUMBER: -1

## 2021-08-25 ENCOUNTER — PATIENT MESSAGE (OUTPATIENT)
Dept: INTERNAL MEDICINE | Facility: CLINIC | Age: 26
End: 2021-08-25

## 2021-08-25 DIAGNOSIS — J30.1 SEASONAL ALLERGIC RHINITIS DUE TO POLLEN: Primary | ICD-10-CM

## 2021-08-25 RX ORDER — FLUTICASONE PROPIONATE 50 MCG
1 SPRAY, SUSPENSION (ML) NASAL DAILY
Qty: 16 G | Refills: 0 | Status: SHIPPED | OUTPATIENT
Start: 2021-08-25 | End: 2023-06-01

## 2021-09-17 ENCOUNTER — LAB VISIT (OUTPATIENT)
Dept: LAB | Facility: HOSPITAL | Age: 26
End: 2021-09-17
Attending: INTERNAL MEDICINE
Payer: COMMERCIAL

## 2021-09-17 ENCOUNTER — OFFICE VISIT (OUTPATIENT)
Dept: INTERNAL MEDICINE | Facility: CLINIC | Age: 26
End: 2021-09-17
Payer: COMMERCIAL

## 2021-09-17 VITALS
OXYGEN SATURATION: 99 % | HEIGHT: 61 IN | WEIGHT: 238.31 LBS | BODY MASS INDEX: 44.99 KG/M2 | DIASTOLIC BLOOD PRESSURE: 72 MMHG | HEART RATE: 101 BPM | SYSTOLIC BLOOD PRESSURE: 124 MMHG

## 2021-09-17 DIAGNOSIS — Z00.00 PREVENTATIVE HEALTH CARE: ICD-10-CM

## 2021-09-17 DIAGNOSIS — Z00.00 PREVENTATIVE HEALTH CARE: Primary | ICD-10-CM

## 2021-09-17 DIAGNOSIS — Z23 NEED FOR VACCINATION: ICD-10-CM

## 2021-09-17 PROCEDURE — 1160F RVW MEDS BY RX/DR IN RCRD: CPT | Mod: CPTII,S$GLB,, | Performed by: INTERNAL MEDICINE

## 2021-09-17 PROCEDURE — 3074F PR MOST RECENT SYSTOLIC BLOOD PRESSURE < 130 MM HG: ICD-10-PCS | Mod: CPTII,S$GLB,, | Performed by: INTERNAL MEDICINE

## 2021-09-17 PROCEDURE — 99999 PR PBB SHADOW E&M-EST. PATIENT-LVL III: ICD-10-PCS | Mod: PBBFAC,,, | Performed by: INTERNAL MEDICINE

## 2021-09-17 PROCEDURE — 86592 SYPHILIS TEST NON-TREP QUAL: CPT | Performed by: INTERNAL MEDICINE

## 2021-09-17 PROCEDURE — 1160F PR REVIEW ALL MEDS BY PRESCRIBER/CLIN PHARMACIST DOCUMENTED: ICD-10-PCS | Mod: CPTII,S$GLB,, | Performed by: INTERNAL MEDICINE

## 2021-09-17 PROCEDURE — 80061 LIPID PANEL: CPT | Performed by: INTERNAL MEDICINE

## 2021-09-17 PROCEDURE — 3008F BODY MASS INDEX DOCD: CPT | Mod: CPTII,S$GLB,, | Performed by: INTERNAL MEDICINE

## 2021-09-17 PROCEDURE — 3008F PR BODY MASS INDEX (BMI) DOCUMENTED: ICD-10-PCS | Mod: CPTII,S$GLB,, | Performed by: INTERNAL MEDICINE

## 2021-09-17 PROCEDURE — 36415 COLL VENOUS BLD VENIPUNCTURE: CPT | Mod: PO | Performed by: INTERNAL MEDICINE

## 2021-09-17 PROCEDURE — 3078F DIAST BP <80 MM HG: CPT | Mod: CPTII,S$GLB,, | Performed by: INTERNAL MEDICINE

## 2021-09-17 PROCEDURE — 80076 HEPATIC FUNCTION PANEL: CPT | Performed by: INTERNAL MEDICINE

## 2021-09-17 PROCEDURE — 80048 BASIC METABOLIC PNL TOTAL CA: CPT | Performed by: INTERNAL MEDICINE

## 2021-09-17 PROCEDURE — 86803 HEPATITIS C AB TEST: CPT | Performed by: INTERNAL MEDICINE

## 2021-09-17 PROCEDURE — 3074F SYST BP LT 130 MM HG: CPT | Mod: CPTII,S$GLB,, | Performed by: INTERNAL MEDICINE

## 2021-09-17 PROCEDURE — 1159F MED LIST DOCD IN RCRD: CPT | Mod: CPTII,S$GLB,, | Performed by: INTERNAL MEDICINE

## 2021-09-17 PROCEDURE — 83036 HEMOGLOBIN GLYCOSYLATED A1C: CPT | Performed by: INTERNAL MEDICINE

## 2021-09-17 PROCEDURE — 99395 PR PREVENTIVE VISIT,EST,18-39: ICD-10-PCS | Mod: 25,S$GLB,, | Performed by: INTERNAL MEDICINE

## 2021-09-17 PROCEDURE — 99395 PREV VISIT EST AGE 18-39: CPT | Mod: 25,S$GLB,, | Performed by: INTERNAL MEDICINE

## 2021-09-17 PROCEDURE — 1159F PR MEDICATION LIST DOCUMENTED IN MEDICAL RECORD: ICD-10-PCS | Mod: CPTII,S$GLB,, | Performed by: INTERNAL MEDICINE

## 2021-09-17 PROCEDURE — 99999 PR PBB SHADOW E&M-EST. PATIENT-LVL III: CPT | Mod: PBBFAC,,, | Performed by: INTERNAL MEDICINE

## 2021-09-17 PROCEDURE — 87389 HIV-1 AG W/HIV-1&-2 AB AG IA: CPT | Performed by: INTERNAL MEDICINE

## 2021-09-17 PROCEDURE — 3078F PR MOST RECENT DIASTOLIC BLOOD PRESSURE < 80 MM HG: ICD-10-PCS | Mod: CPTII,S$GLB,, | Performed by: INTERNAL MEDICINE

## 2021-09-18 LAB
ALBUMIN SERPL BCP-MCNC: 3.7 G/DL (ref 3.5–5.2)
ALP SERPL-CCNC: 65 U/L (ref 55–135)
ALT SERPL W/O P-5'-P-CCNC: 11 U/L (ref 10–44)
ANION GAP SERPL CALC-SCNC: 8 MMOL/L (ref 8–16)
AST SERPL-CCNC: 14 U/L (ref 10–40)
BILIRUB DIRECT SERPL-MCNC: 0.2 MG/DL (ref 0.1–0.3)
BILIRUB SERPL-MCNC: 0.4 MG/DL (ref 0.1–1)
BUN SERPL-MCNC: 9 MG/DL (ref 6–20)
CALCIUM SERPL-MCNC: 9.5 MG/DL (ref 8.7–10.5)
CHLORIDE SERPL-SCNC: 106 MMOL/L (ref 95–110)
CHOLEST SERPL-MCNC: 130 MG/DL (ref 120–199)
CHOLEST/HDLC SERPL: 3.7 {RATIO} (ref 2–5)
CO2 SERPL-SCNC: 22 MMOL/L (ref 23–29)
CREAT SERPL-MCNC: 0.8 MG/DL (ref 0.5–1.4)
EST. GFR  (AFRICAN AMERICAN): >60 ML/MIN/1.73 M^2
EST. GFR  (NON AFRICAN AMERICAN): >60 ML/MIN/1.73 M^2
ESTIMATED AVG GLUCOSE: 143 MG/DL (ref 68–131)
GLUCOSE SERPL-MCNC: 143 MG/DL (ref 70–110)
HBA1C MFR BLD: 6.6 % (ref 4–5.6)
HDLC SERPL-MCNC: 35 MG/DL (ref 40–75)
HDLC SERPL: 26.9 % (ref 20–50)
LDLC SERPL CALC-MCNC: 77.4 MG/DL (ref 63–159)
NONHDLC SERPL-MCNC: 95 MG/DL
POTASSIUM SERPL-SCNC: 3.5 MMOL/L (ref 3.5–5.1)
PROT SERPL-MCNC: 7.6 G/DL (ref 6–8.4)
RPR SER QL: NORMAL
SODIUM SERPL-SCNC: 136 MMOL/L (ref 136–145)
TRIGL SERPL-MCNC: 88 MG/DL (ref 30–150)

## 2021-09-20 ENCOUNTER — IMMUNIZATION (OUTPATIENT)
Dept: PRIMARY CARE CLINIC | Facility: CLINIC | Age: 26
End: 2021-09-20
Payer: COMMERCIAL

## 2021-09-20 DIAGNOSIS — Z23 NEED FOR VACCINATION: Primary | ICD-10-CM

## 2021-09-20 LAB
HCV AB SERPL QL IA: NEGATIVE
HIV 1+2 AB+HIV1 P24 AG SERPL QL IA: NEGATIVE

## 2021-09-20 PROCEDURE — 91300 COVID-19, MRNA, LNP-S, PF, 30 MCG/0.3 ML DOSE VACCINE: CPT | Mod: PBBFAC | Performed by: INTERNAL MEDICINE

## 2021-09-24 ENCOUNTER — PATIENT MESSAGE (OUTPATIENT)
Dept: INTERNAL MEDICINE | Facility: CLINIC | Age: 26
End: 2021-09-24

## 2021-09-29 ENCOUNTER — OFFICE VISIT (OUTPATIENT)
Dept: INTERNAL MEDICINE | Facility: CLINIC | Age: 26
End: 2021-09-29
Payer: COMMERCIAL

## 2021-09-29 ENCOUNTER — PATIENT MESSAGE (OUTPATIENT)
Dept: INTERNAL MEDICINE | Facility: CLINIC | Age: 26
End: 2021-09-29

## 2021-09-29 DIAGNOSIS — E66.01 CLASS 3 SEVERE OBESITY DUE TO EXCESS CALORIES WITHOUT SERIOUS COMORBIDITY WITH BODY MASS INDEX (BMI) OF 45.0 TO 49.9 IN ADULT: ICD-10-CM

## 2021-09-29 DIAGNOSIS — E11.69 TYPE 2 DIABETES MELLITUS WITH OTHER SPECIFIED COMPLICATION, WITHOUT LONG-TERM CURRENT USE OF INSULIN: Primary | ICD-10-CM

## 2021-09-29 PROCEDURE — 3044F HG A1C LEVEL LT 7.0%: CPT | Mod: CPTII,95,, | Performed by: INTERNAL MEDICINE

## 2021-09-29 PROCEDURE — 99213 OFFICE O/P EST LOW 20 MIN: CPT | Mod: 95,,, | Performed by: INTERNAL MEDICINE

## 2021-09-29 PROCEDURE — 1159F PR MEDICATION LIST DOCUMENTED IN MEDICAL RECORD: ICD-10-PCS | Mod: CPTII,95,, | Performed by: INTERNAL MEDICINE

## 2021-09-29 PROCEDURE — 1160F RVW MEDS BY RX/DR IN RCRD: CPT | Mod: CPTII,95,, | Performed by: INTERNAL MEDICINE

## 2021-09-29 PROCEDURE — 3044F PR MOST RECENT HEMOGLOBIN A1C LEVEL <7.0%: ICD-10-PCS | Mod: CPTII,95,, | Performed by: INTERNAL MEDICINE

## 2021-09-29 PROCEDURE — 1160F PR REVIEW ALL MEDS BY PRESCRIBER/CLIN PHARMACIST DOCUMENTED: ICD-10-PCS | Mod: CPTII,95,, | Performed by: INTERNAL MEDICINE

## 2021-09-29 PROCEDURE — 1159F MED LIST DOCD IN RCRD: CPT | Mod: CPTII,95,, | Performed by: INTERNAL MEDICINE

## 2021-09-29 PROCEDURE — 99213 PR OFFICE/OUTPT VISIT, EST, LEVL III, 20-29 MIN: ICD-10-PCS | Mod: 95,,, | Performed by: INTERNAL MEDICINE

## 2021-09-29 RX ORDER — METFORMIN HYDROCHLORIDE 500 MG/1
500 TABLET ORAL 2 TIMES DAILY WITH MEALS
Qty: 180 TABLET | Refills: 0 | Status: SHIPPED | OUTPATIENT
Start: 2021-09-29 | End: 2021-12-27

## 2021-09-30 ENCOUNTER — CLINICAL SUPPORT (OUTPATIENT)
Dept: FAMILY MEDICINE | Facility: CLINIC | Age: 26
End: 2021-09-30
Payer: COMMERCIAL

## 2021-09-30 ENCOUNTER — TELEPHONE (OUTPATIENT)
Dept: ADMINISTRATIVE | Facility: OTHER | Age: 26
End: 2021-09-30

## 2021-09-30 ENCOUNTER — LAB VISIT (OUTPATIENT)
Dept: LAB | Facility: HOSPITAL | Age: 26
End: 2021-09-30
Attending: INTERNAL MEDICINE
Payer: COMMERCIAL

## 2021-09-30 DIAGNOSIS — Z23 NEED FOR DIPHTHERIA-TETANUS-PERTUSSIS (TDAP) VACCINE: Primary | ICD-10-CM

## 2021-09-30 DIAGNOSIS — E11.69 TYPE 2 DIABETES MELLITUS WITH OTHER SPECIFIED COMPLICATION, WITHOUT LONG-TERM CURRENT USE OF INSULIN: ICD-10-CM

## 2021-09-30 LAB
ANION GAP SERPL CALC-SCNC: 8 MMOL/L (ref 8–16)
BUN SERPL-MCNC: 8 MG/DL (ref 6–20)
CALCIUM SERPL-MCNC: 9.8 MG/DL (ref 8.7–10.5)
CHLORIDE SERPL-SCNC: 106 MMOL/L (ref 95–110)
CO2 SERPL-SCNC: 25 MMOL/L (ref 23–29)
CREAT SERPL-MCNC: 0.8 MG/DL (ref 0.5–1.4)
EST. GFR  (AFRICAN AMERICAN): >60 ML/MIN/1.73 M^2
EST. GFR  (NON AFRICAN AMERICAN): >60 ML/MIN/1.73 M^2
GLUCOSE SERPL-MCNC: 73 MG/DL (ref 70–110)
POTASSIUM SERPL-SCNC: 3.9 MMOL/L (ref 3.5–5.1)
SODIUM SERPL-SCNC: 139 MMOL/L (ref 136–145)

## 2021-09-30 PROCEDURE — 80048 BASIC METABOLIC PNL TOTAL CA: CPT | Performed by: INTERNAL MEDICINE

## 2021-09-30 PROCEDURE — 90472 IMMUNIZATION ADMIN EACH ADD: CPT | Mod: S$GLB,,, | Performed by: INTERNAL MEDICINE

## 2021-09-30 PROCEDURE — 90472 TDAP VACCINE GREATER THAN OR EQUAL TO 7YO IM: ICD-10-PCS | Mod: S$GLB,,, | Performed by: INTERNAL MEDICINE

## 2021-09-30 PROCEDURE — 90471 IMMUNIZATION ADMIN: CPT | Mod: S$GLB,,, | Performed by: INTERNAL MEDICINE

## 2021-09-30 PROCEDURE — 90732 PPSV23 VACC 2 YRS+ SUBQ/IM: CPT | Mod: S$GLB,,, | Performed by: INTERNAL MEDICINE

## 2021-09-30 PROCEDURE — 90715 TDAP VACCINE GREATER THAN OR EQUAL TO 7YO IM: ICD-10-PCS | Mod: S$GLB,,, | Performed by: INTERNAL MEDICINE

## 2021-09-30 PROCEDURE — 90471 PNEUMOCOCCAL POLYSACCHARIDE VACCINE 23-VALENT =>2YO SQ IM: ICD-10-PCS | Mod: S$GLB,,, | Performed by: INTERNAL MEDICINE

## 2021-09-30 PROCEDURE — 90732 PNEUMOCOCCAL POLYSACCHARIDE VACCINE 23-VALENT =>2YO SQ IM: ICD-10-PCS | Mod: S$GLB,,, | Performed by: INTERNAL MEDICINE

## 2021-09-30 PROCEDURE — 90715 TDAP VACCINE 7 YRS/> IM: CPT | Mod: S$GLB,,, | Performed by: INTERNAL MEDICINE

## 2021-09-30 PROCEDURE — 36415 COLL VENOUS BLD VENIPUNCTURE: CPT | Mod: PO | Performed by: INTERNAL MEDICINE

## 2021-10-04 ENCOUNTER — CLINICAL SUPPORT (OUTPATIENT)
Dept: DIABETES | Facility: CLINIC | Age: 26
End: 2021-10-04
Payer: COMMERCIAL

## 2021-10-04 DIAGNOSIS — E11.69 TYPE 2 DIABETES MELLITUS WITH OTHER SPECIFIED COMPLICATION, WITHOUT LONG-TERM CURRENT USE OF INSULIN: ICD-10-CM

## 2021-10-04 PROCEDURE — 99999 PR PBB SHADOW E&M-EST. PATIENT-LVL I: CPT | Mod: PBBFAC,,, | Performed by: DIETITIAN, REGISTERED

## 2021-10-04 PROCEDURE — 99999 PR PBB SHADOW E&M-EST. PATIENT-LVL I: ICD-10-PCS | Mod: PBBFAC,,, | Performed by: DIETITIAN, REGISTERED

## 2021-10-04 PROCEDURE — G0108 PR DIAB MANAGE TRN  PER INDIV: ICD-10-PCS | Mod: S$GLB,,, | Performed by: DIETITIAN, REGISTERED

## 2021-10-04 PROCEDURE — G0108 DIAB MANAGE TRN  PER INDIV: HCPCS | Mod: S$GLB,,, | Performed by: DIETITIAN, REGISTERED

## 2021-10-07 ENCOUNTER — OFFICE VISIT (OUTPATIENT)
Dept: OPTOMETRY | Facility: CLINIC | Age: 26
End: 2021-10-07
Payer: COMMERCIAL

## 2021-10-07 DIAGNOSIS — E11.69 TYPE 2 DIABETES MELLITUS WITH OTHER SPECIFIED COMPLICATION, WITHOUT LONG-TERM CURRENT USE OF INSULIN: Primary | ICD-10-CM

## 2021-10-07 DIAGNOSIS — H52.13 MYOPIA OF BOTH EYES: ICD-10-CM

## 2021-10-07 PROCEDURE — 1159F MED LIST DOCD IN RCRD: CPT | Mod: CPTII,S$GLB,, | Performed by: OPTOMETRIST

## 2021-10-07 PROCEDURE — 3044F PR MOST RECENT HEMOGLOBIN A1C LEVEL <7.0%: ICD-10-PCS | Mod: CPTII,S$GLB,, | Performed by: OPTOMETRIST

## 2021-10-07 PROCEDURE — 92015 PR REFRACTION: ICD-10-PCS | Mod: S$GLB,,, | Performed by: OPTOMETRIST

## 2021-10-07 PROCEDURE — 1159F PR MEDICATION LIST DOCUMENTED IN MEDICAL RECORD: ICD-10-PCS | Mod: CPTII,S$GLB,, | Performed by: OPTOMETRIST

## 2021-10-07 PROCEDURE — 92004 COMPRE OPH EXAM NEW PT 1/>: CPT | Mod: S$GLB,,, | Performed by: OPTOMETRIST

## 2021-10-07 PROCEDURE — 92004 PR EYE EXAM, NEW PATIENT,COMPREHESV: ICD-10-PCS | Mod: S$GLB,,, | Performed by: OPTOMETRIST

## 2021-10-07 PROCEDURE — 99999 PR PBB SHADOW E&M-EST. PATIENT-LVL III: CPT | Mod: PBBFAC,,, | Performed by: OPTOMETRIST

## 2021-10-07 PROCEDURE — 2023F PR DILATED RETINAL EXAM W/O EVID OF RETINOPATHY: ICD-10-PCS | Mod: CPTII,S$GLB,, | Performed by: OPTOMETRIST

## 2021-10-07 PROCEDURE — 2023F DILAT RTA XM W/O RTNOPTHY: CPT | Mod: CPTII,S$GLB,, | Performed by: OPTOMETRIST

## 2021-10-07 PROCEDURE — 92015 DETERMINE REFRACTIVE STATE: CPT | Mod: S$GLB,,, | Performed by: OPTOMETRIST

## 2021-10-07 PROCEDURE — 3044F HG A1C LEVEL LT 7.0%: CPT | Mod: CPTII,S$GLB,, | Performed by: OPTOMETRIST

## 2021-10-07 PROCEDURE — 99999 PR PBB SHADOW E&M-EST. PATIENT-LVL III: ICD-10-PCS | Mod: PBBFAC,,, | Performed by: OPTOMETRIST

## 2021-11-11 DIAGNOSIS — E11.69 TYPE 2 DIABETES MELLITUS WITH OTHER SPECIFIED COMPLICATION, WITHOUT LONG-TERM CURRENT USE OF INSULIN: Primary | ICD-10-CM

## 2021-11-11 RX ORDER — LANCETS
1 EACH MISCELLANEOUS DAILY
Qty: 30 EACH | Refills: 6 | Status: SHIPPED | OUTPATIENT
Start: 2021-11-11 | End: 2021-12-29 | Stop reason: SDUPTHER

## 2021-11-11 RX ORDER — INSULIN PUMP SYRINGE, 3 ML
EACH MISCELLANEOUS
Qty: 1 EACH | Refills: 0 | Status: SHIPPED | OUTPATIENT
Start: 2021-11-11 | End: 2022-11-11

## 2021-12-03 LAB — PAP RECOMMENDATION EXT: NORMAL

## 2021-12-23 ENCOUNTER — LAB VISIT (OUTPATIENT)
Dept: LAB | Facility: HOSPITAL | Age: 26
End: 2021-12-23
Attending: INTERNAL MEDICINE
Payer: COMMERCIAL

## 2021-12-23 DIAGNOSIS — E11.69 TYPE 2 DIABETES MELLITUS WITH OTHER SPECIFIED COMPLICATION, WITHOUT LONG-TERM CURRENT USE OF INSULIN: ICD-10-CM

## 2021-12-23 LAB
ANION GAP SERPL CALC-SCNC: 6 MMOL/L (ref 8–16)
BUN SERPL-MCNC: 9 MG/DL (ref 6–20)
CALCIUM SERPL-MCNC: 9.4 MG/DL (ref 8.7–10.5)
CHLORIDE SERPL-SCNC: 105 MMOL/L (ref 95–110)
CO2 SERPL-SCNC: 24 MMOL/L (ref 23–29)
CREAT SERPL-MCNC: 0.7 MG/DL (ref 0.5–1.4)
EST. GFR  (AFRICAN AMERICAN): >60 ML/MIN/1.73 M^2
EST. GFR  (NON AFRICAN AMERICAN): >60 ML/MIN/1.73 M^2
ESTIMATED AVG GLUCOSE: 114 MG/DL (ref 68–131)
GLUCOSE SERPL-MCNC: 94 MG/DL (ref 70–110)
HBA1C MFR BLD: 5.6 % (ref 4–5.6)
POTASSIUM SERPL-SCNC: 4 MMOL/L (ref 3.5–5.1)
SODIUM SERPL-SCNC: 135 MMOL/L (ref 136–145)

## 2021-12-23 PROCEDURE — 83036 HEMOGLOBIN GLYCOSYLATED A1C: CPT | Performed by: INTERNAL MEDICINE

## 2021-12-23 PROCEDURE — 80048 BASIC METABOLIC PNL TOTAL CA: CPT | Performed by: INTERNAL MEDICINE

## 2021-12-23 PROCEDURE — 36415 COLL VENOUS BLD VENIPUNCTURE: CPT | Mod: PO | Performed by: INTERNAL MEDICINE

## 2021-12-29 ENCOUNTER — OFFICE VISIT (OUTPATIENT)
Dept: INTERNAL MEDICINE | Facility: CLINIC | Age: 26
End: 2021-12-29
Payer: COMMERCIAL

## 2021-12-29 VITALS
OXYGEN SATURATION: 98 % | DIASTOLIC BLOOD PRESSURE: 80 MMHG | SYSTOLIC BLOOD PRESSURE: 122 MMHG | HEIGHT: 61 IN | WEIGHT: 229.75 LBS | HEART RATE: 96 BPM | BODY MASS INDEX: 43.38 KG/M2

## 2021-12-29 DIAGNOSIS — E11.69 TYPE 2 DIABETES MELLITUS WITH OTHER SPECIFIED COMPLICATION, WITHOUT LONG-TERM CURRENT USE OF INSULIN: ICD-10-CM

## 2021-12-29 DIAGNOSIS — E66.01 CLASS 3 SEVERE OBESITY DUE TO EXCESS CALORIES WITHOUT SERIOUS COMORBIDITY WITH BODY MASS INDEX (BMI) OF 40.0 TO 44.9 IN ADULT: Primary | ICD-10-CM

## 2021-12-29 PROBLEM — E11.9 TYPE 2 DIABETES MELLITUS, WITHOUT LONG-TERM CURRENT USE OF INSULIN: Status: ACTIVE | Noted: 2021-12-29

## 2021-12-29 PROCEDURE — 99999 PR PBB SHADOW E&M-EST. PATIENT-LVL III: ICD-10-PCS | Mod: PBBFAC,,, | Performed by: INTERNAL MEDICINE

## 2021-12-29 PROCEDURE — 1159F PR MEDICATION LIST DOCUMENTED IN MEDICAL RECORD: ICD-10-PCS | Mod: CPTII,S$GLB,, | Performed by: INTERNAL MEDICINE

## 2021-12-29 PROCEDURE — 99999 PR PBB SHADOW E&M-EST. PATIENT-LVL III: CPT | Mod: PBBFAC,,, | Performed by: INTERNAL MEDICINE

## 2021-12-29 PROCEDURE — 3074F SYST BP LT 130 MM HG: CPT | Mod: CPTII,S$GLB,, | Performed by: INTERNAL MEDICINE

## 2021-12-29 PROCEDURE — 3061F NEG MICROALBUMINURIA REV: CPT | Mod: CPTII,S$GLB,, | Performed by: INTERNAL MEDICINE

## 2021-12-29 PROCEDURE — 3066F PR DOCUMENTATION OF TREATMENT FOR NEPHROPATHY: ICD-10-PCS | Mod: CPTII,S$GLB,, | Performed by: INTERNAL MEDICINE

## 2021-12-29 PROCEDURE — 99214 PR OFFICE/OUTPT VISIT, EST, LEVL IV, 30-39 MIN: ICD-10-PCS | Mod: S$GLB,,, | Performed by: INTERNAL MEDICINE

## 2021-12-29 PROCEDURE — 3044F PR MOST RECENT HEMOGLOBIN A1C LEVEL <7.0%: ICD-10-PCS | Mod: CPTII,S$GLB,, | Performed by: INTERNAL MEDICINE

## 2021-12-29 PROCEDURE — 3008F BODY MASS INDEX DOCD: CPT | Mod: CPTII,S$GLB,, | Performed by: INTERNAL MEDICINE

## 2021-12-29 PROCEDURE — 3044F HG A1C LEVEL LT 7.0%: CPT | Mod: CPTII,S$GLB,, | Performed by: INTERNAL MEDICINE

## 2021-12-29 PROCEDURE — 99214 OFFICE O/P EST MOD 30 MIN: CPT | Mod: S$GLB,,, | Performed by: INTERNAL MEDICINE

## 2021-12-29 PROCEDURE — 3066F NEPHROPATHY DOC TX: CPT | Mod: CPTII,S$GLB,, | Performed by: INTERNAL MEDICINE

## 2021-12-29 PROCEDURE — 3079F DIAST BP 80-89 MM HG: CPT | Mod: CPTII,S$GLB,, | Performed by: INTERNAL MEDICINE

## 2021-12-29 PROCEDURE — 3008F PR BODY MASS INDEX (BMI) DOCUMENTED: ICD-10-PCS | Mod: CPTII,S$GLB,, | Performed by: INTERNAL MEDICINE

## 2021-12-29 PROCEDURE — 3061F PR NEG MICROALBUMINURIA RESULT DOCUMENTED/REVIEW: ICD-10-PCS | Mod: CPTII,S$GLB,, | Performed by: INTERNAL MEDICINE

## 2021-12-29 PROCEDURE — 3074F PR MOST RECENT SYSTOLIC BLOOD PRESSURE < 130 MM HG: ICD-10-PCS | Mod: CPTII,S$GLB,, | Performed by: INTERNAL MEDICINE

## 2021-12-29 PROCEDURE — 3079F PR MOST RECENT DIASTOLIC BLOOD PRESSURE 80-89 MM HG: ICD-10-PCS | Mod: CPTII,S$GLB,, | Performed by: INTERNAL MEDICINE

## 2021-12-29 PROCEDURE — 1159F MED LIST DOCD IN RCRD: CPT | Mod: CPTII,S$GLB,, | Performed by: INTERNAL MEDICINE

## 2021-12-29 RX ORDER — LANCETS
1 EACH MISCELLANEOUS DAILY
Qty: 100 EACH | Refills: 1 | Status: SHIPPED | OUTPATIENT
Start: 2021-12-29 | End: 2023-12-15

## 2021-12-30 ENCOUNTER — PATIENT OUTREACH (OUTPATIENT)
Dept: ADMINISTRATIVE | Facility: HOSPITAL | Age: 26
End: 2021-12-30
Payer: COMMERCIAL

## 2022-02-23 ENCOUNTER — OFFICE VISIT (OUTPATIENT)
Dept: URGENT CARE | Facility: CLINIC | Age: 27
End: 2022-02-23
Payer: COMMERCIAL

## 2022-02-23 VITALS
WEIGHT: 229 LBS | RESPIRATION RATE: 18 BRPM | HEIGHT: 61 IN | BODY MASS INDEX: 43.23 KG/M2 | DIASTOLIC BLOOD PRESSURE: 90 MMHG | OXYGEN SATURATION: 100 % | SYSTOLIC BLOOD PRESSURE: 141 MMHG | TEMPERATURE: 99 F | HEART RATE: 101 BPM

## 2022-02-23 DIAGNOSIS — J02.9 VIRAL PHARYNGITIS: Primary | ICD-10-CM

## 2022-02-23 DIAGNOSIS — J02.9 SORE THROAT: ICD-10-CM

## 2022-02-23 PROCEDURE — 1160F RVW MEDS BY RX/DR IN RCRD: CPT | Mod: CPTII,S$GLB,, | Performed by: NURSE PRACTITIONER

## 2022-02-23 PROCEDURE — 87651 STREP A DNA AMP PROBE: CPT | Mod: QW,S$GLB,, | Performed by: NURSE PRACTITIONER

## 2022-02-23 PROCEDURE — 87651 POCT STREP A MOLECULAR: ICD-10-PCS | Mod: QW,S$GLB,, | Performed by: NURSE PRACTITIONER

## 2022-02-23 PROCEDURE — 3080F DIAST BP >= 90 MM HG: CPT | Mod: CPTII,S$GLB,, | Performed by: NURSE PRACTITIONER

## 2022-02-23 PROCEDURE — 99213 OFFICE O/P EST LOW 20 MIN: CPT | Mod: S$GLB,CS,, | Performed by: NURSE PRACTITIONER

## 2022-02-23 PROCEDURE — 3072F LOW RISK FOR RETINOPATHY: CPT | Mod: CPTII,S$GLB,, | Performed by: NURSE PRACTITIONER

## 2022-02-23 PROCEDURE — 3008F BODY MASS INDEX DOCD: CPT | Mod: CPTII,S$GLB,, | Performed by: NURSE PRACTITIONER

## 2022-02-23 PROCEDURE — 3072F PR LOW RISK FOR RETINOPATHY: ICD-10-PCS | Mod: CPTII,S$GLB,, | Performed by: NURSE PRACTITIONER

## 2022-02-23 PROCEDURE — 3077F SYST BP >= 140 MM HG: CPT | Mod: CPTII,S$GLB,, | Performed by: NURSE PRACTITIONER

## 2022-02-23 PROCEDURE — 1160F PR REVIEW ALL MEDS BY PRESCRIBER/CLIN PHARMACIST DOCUMENTED: ICD-10-PCS | Mod: CPTII,S$GLB,, | Performed by: NURSE PRACTITIONER

## 2022-02-23 PROCEDURE — U0002: ICD-10-PCS | Mod: QW,S$GLB,, | Performed by: NURSE PRACTITIONER

## 2022-02-23 PROCEDURE — U0002 COVID-19 LAB TEST NON-CDC: HCPCS | Mod: QW,S$GLB,, | Performed by: NURSE PRACTITIONER

## 2022-02-23 PROCEDURE — 3077F PR MOST RECENT SYSTOLIC BLOOD PRESSURE >= 140 MM HG: ICD-10-PCS | Mod: CPTII,S$GLB,, | Performed by: NURSE PRACTITIONER

## 2022-02-23 PROCEDURE — 3008F PR BODY MASS INDEX (BMI) DOCUMENTED: ICD-10-PCS | Mod: CPTII,S$GLB,, | Performed by: NURSE PRACTITIONER

## 2022-02-23 PROCEDURE — 1159F PR MEDICATION LIST DOCUMENTED IN MEDICAL RECORD: ICD-10-PCS | Mod: CPTII,S$GLB,, | Performed by: NURSE PRACTITIONER

## 2022-02-23 PROCEDURE — 1159F MED LIST DOCD IN RCRD: CPT | Mod: CPTII,S$GLB,, | Performed by: NURSE PRACTITIONER

## 2022-02-23 PROCEDURE — 99213 PR OFFICE/OUTPT VISIT, EST, LEVL III, 20-29 MIN: ICD-10-PCS | Mod: S$GLB,CS,, | Performed by: NURSE PRACTITIONER

## 2022-02-23 PROCEDURE — 3080F PR MOST RECENT DIASTOLIC BLOOD PRESSURE >= 90 MM HG: ICD-10-PCS | Mod: CPTII,S$GLB,, | Performed by: NURSE PRACTITIONER

## 2022-02-23 RX ORDER — DEXTROSE 4 G
TABLET,CHEWABLE ORAL
COMMUNITY
Start: 2021-11-11

## 2022-02-23 RX ORDER — VALACYCLOVIR HYDROCHLORIDE 500 MG/1
500 TABLET, FILM COATED ORAL 2 TIMES DAILY
COMMUNITY
Start: 2021-12-24

## 2022-02-23 NOTE — PROGRESS NOTES
"Subjective:       Patient ID: Karen Paredes is a 26 y.o. female.    Vitals:  height is 5' 1" (1.549 m) and weight is 103.9 kg (229 lb). Her oral temperature is 98.6 °F (37 °C). Her blood pressure is 141/90 (abnormal) and her pulse is 101. Her respiration is 18 and oxygen saturation is 100%.     Chief Complaint: Sore Throat and Headache    Pt presents to urgent care for sore throat, headache, bilateral shoulder pain & neck pain which started today. No fever or chills    Sore Throat   This is a new problem. The current episode started today. The problem has been gradually worsening. Neither side of throat is experiencing more pain than the other. There has been no fever. The pain is at a severity of 7/10. Associated symptoms include ear pain, headaches, neck pain, swollen glands and trouble swallowing. Pertinent negatives include no abdominal pain, congestion, coughing, diarrhea, ear discharge, shortness of breath or vomiting. She has tried nothing for the symptoms.   Headache   Associated symptoms include ear pain, neck pain, a sore throat and swollen glands. Pertinent negatives include no abdominal pain, coughing, fever, hearing loss, nausea, numbness, sinus pressure, tinnitus or vomiting.       Constitution: Positive for fatigue. Negative for chills, sweating and fever.   HENT: Positive for ear pain, sore throat and trouble swallowing. Negative for ear discharge, tinnitus, hearing loss, congestion, sinus pain, sinus pressure and voice change.    Neck: Positive for neck pain. Negative for painful lymph nodes.   Cardiovascular: Negative for chest pain, palpitations and sob on exertion.   Respiratory: Negative for cough, sputum production, shortness of breath and wheezing.    Gastrointestinal: Negative for abdominal pain, nausea, vomiting and diarrhea.   Musculoskeletal: Positive for muscle ache.   Skin: Negative for color change, pale and rash.   Allergic/Immunologic: Negative for sneezing.   Neurological: Positive " for headaches. Negative for numbness and tingling.   Hematologic/Lymphatic: Negative for swollen lymph nodes.       Objective:      Physical Exam   Constitutional: She is oriented to person, place, and time. She appears well-developed. She is cooperative.  Non-toxic appearance. She does not appear ill. No distress.   HENT:   Head: Normocephalic and atraumatic.   Ears:   Right Ear: Hearing, tympanic membrane, external ear and ear canal normal.   Left Ear: Hearing, tympanic membrane, external ear and ear canal normal.   Nose: Nose normal. No mucosal edema, rhinorrhea, nasal deformity or congestion. No epistaxis. Right sinus exhibits no maxillary sinus tenderness and no frontal sinus tenderness. Left sinus exhibits no maxillary sinus tenderness and no frontal sinus tenderness.   Mouth/Throat: Uvula is midline and mucous membranes are normal. No trismus in the jaw. Normal dentition. No uvula swelling. Posterior oropharyngeal erythema present. No oropharyngeal exudate or posterior oropharyngeal edema.   Eyes: Conjunctivae and lids are normal. No scleral icterus.   Neck: Trachea normal and phonation normal. Neck supple. No edema present. No erythema present. No neck rigidity present.   Cardiovascular: Normal rate.   Pulmonary/Chest: Effort normal. No respiratory distress. She has no decreased breath sounds.   Abdominal: Normal appearance.   Musculoskeletal: Normal range of motion.         General: No deformity. Normal range of motion.      Cervical back: She exhibits no tenderness.   Lymphadenopathy:     She has cervical adenopathy.   Neurological: She is alert and oriented to person, place, and time. She exhibits normal muscle tone. Coordination normal.   Skin: Skin is warm, dry, intact, not diaphoretic and not pale.   Psychiatric: Her speech is normal and behavior is normal. Judgment and thought content normal.   Nursing note and vitals reviewed.        Results for orders placed or performed in visit on 02/23/22   POCT  COVID-19 Rapid Screening   Result Value Ref Range    POC Rapid COVID Negative Negative     Acceptable Yes    POCT Strep A, Molecular   Result Value Ref Range    Molecular Strep A, POC Negative Negative     Acceptable Yes        Assessment:       1. Viral pharyngitis    2. Sore throat          Plan:         Viral pharyngitis    Sore throat  -     POCT COVID-19 Rapid Screening

## 2022-03-26 DIAGNOSIS — E11.69 TYPE 2 DIABETES MELLITUS WITH OTHER SPECIFIED COMPLICATION, WITHOUT LONG-TERM CURRENT USE OF INSULIN: ICD-10-CM

## 2022-03-26 NOTE — TELEPHONE ENCOUNTER
Care Due:                  Date            Visit Type   Department     Provider  --------------------------------------------------------------------------------                                EP -                              PRIMARY      [facility] INTERNAL  Last Visit: 12-      CARE ([facility])   MEDICINE       Kaleb Mchugh                               -                              PRIMARY      [facility] INTERNAL  Next Visit: 06-      CARE ([facility])   MEDICINE       Kaleb Mchugh                                                            Last  Test          Frequency    Reason                     Performed    Due Date  --------------------------------------------------------------------------------    HBA1C.......  6 months...  metFORMIN................  12- 06-    Powered by [employer]. Reference number: [id number].   3/26/2022 4:01:25 AM CDT

## 2022-03-27 RX ORDER — METFORMIN HYDROCHLORIDE 500 MG/1
TABLET ORAL
Qty: 180 TABLET | Refills: 0 | Status: SHIPPED | OUTPATIENT
Start: 2022-03-27 | End: 2022-07-14

## 2022-03-27 NOTE — TELEPHONE ENCOUNTER
Refill Authorization Note   Karen Paredes  is requesting a refill authorization.  Brief Assessment and Rationale for Refill:  Approve     Medication Therapy Plan:  FLOS    Medication Reconciliation Completed: No   Comments:   --->Care Gap information included below if applicable.   Orders Placed This Encounter    metFORMIN (GLUCOPHAGE) 500 MG tablet      Requested Prescriptions   Signed Prescriptions Disp Refills    metFORMIN (GLUCOPHAGE) 500 MG tablet 180 tablet 0     Sig: TAKE 1 TABLET(500 MG) BY MOUTH TWICE DAILY WITH MEALS       Endocrinology:  Diabetes - Biguanides Passed - 3/26/2022  4:01 AM        Passed - Patient is at least 18 years old        Passed - Negative Pregnancy Status Check        Passed - Valid encounter within last 15 months     Recent Visits  Date Type Provider Dept   12/29/21 Office Visit Kaleb Mchugh III, MD [facility] Internal Medicine   09/29/21 Office Visit Kaleb Mchugh III, MD [facility] Internal Medicine   09/17/21 Office Visit Kaleb Mchugh III, MD [facility] Internal Medicine   08/18/21 Office Visit Kaleb Mchugh III, MD [facility] Internal Medicine   Showing recent visits within past 720 days and meeting all other requirements  Future Appointments  No visits were found meeting these conditions.  Showing future appointments within next 150 days and meeting all other requirements      Future Appointments              In 2 months LAB, [facility]    In 3 months [facility] - Internal Medicine, [facility]                Passed - Matches previous order       Previous Authorizing Provider: Kaleb Mchugh III, MD (metFORMIN (GLUCOPHAGE) 500 MG tablet)  Previous Pharmacy: [id number]  [address] [facility] [address] AT [facility] [address]            Passed - No ED/Hospital visits since last PCP visit     Last PCP Visit: 12/29/2021 Last Admission: 10/1/2020 Last ED Visit: 2/23/2020          Passed - Cr is 1.39 or below and within 360 days      Lab Results   Component Value Date    CREATININE 0.7 12/23/2021    CREATININE 0.8 09/30/2021    CREATININE 0.8 09/17/2021              Passed - HBA1C within 180 days     Lab Results   Component Value Date    HGBA1C 5.6 12/23/2021    HGBA1C 6.6 (H) 09/17/2021              Passed - eGFR is 45 or above and within 360 days     Lab Results   Component Value Date    EGFRNONAA >60.0 12/23/2021    EGFRNONAA >60.0 09/30/2021    EGFRNONAA >60.0 09/17/2021                    Appointments  past 12m or future 3m with PCP    Date Provider   Last Visit   12/29/2021 Kaleb Mchugh III, MD   Next Visit   6/29/2022 Kaleb Mchugh III, MD   ED visits in past 90 days: 0     Note composed:12:04 PM 03/27/2022

## 2022-07-08 ENCOUNTER — LAB VISIT (OUTPATIENT)
Dept: LAB | Facility: HOSPITAL | Age: 27
End: 2022-07-08
Attending: INTERNAL MEDICINE
Payer: COMMERCIAL

## 2022-07-08 DIAGNOSIS — E11.69 TYPE 2 DIABETES MELLITUS WITH OTHER SPECIFIED COMPLICATION, WITHOUT LONG-TERM CURRENT USE OF INSULIN: ICD-10-CM

## 2022-07-08 LAB
ANION GAP SERPL CALC-SCNC: 7 MMOL/L (ref 8–16)
BUN SERPL-MCNC: 6 MG/DL (ref 6–20)
CALCIUM SERPL-MCNC: 9.3 MG/DL (ref 8.7–10.5)
CHLORIDE SERPL-SCNC: 105 MMOL/L (ref 95–110)
CO2 SERPL-SCNC: 24 MMOL/L (ref 23–29)
CREAT SERPL-MCNC: 0.8 MG/DL (ref 0.5–1.4)
EST. GFR  (AFRICAN AMERICAN): >60 ML/MIN/1.73 M^2
EST. GFR  (NON AFRICAN AMERICAN): >60 ML/MIN/1.73 M^2
ESTIMATED AVG GLUCOSE: 120 MG/DL (ref 68–131)
GLUCOSE SERPL-MCNC: 117 MG/DL (ref 70–110)
HBA1C MFR BLD: 5.8 % (ref 4–5.6)
POTASSIUM SERPL-SCNC: 4.3 MMOL/L (ref 3.5–5.1)
SODIUM SERPL-SCNC: 136 MMOL/L (ref 136–145)

## 2022-07-08 PROCEDURE — 36415 COLL VENOUS BLD VENIPUNCTURE: CPT | Mod: PO | Performed by: INTERNAL MEDICINE

## 2022-07-08 PROCEDURE — 80048 BASIC METABOLIC PNL TOTAL CA: CPT | Performed by: INTERNAL MEDICINE

## 2022-07-08 PROCEDURE — 83036 HEMOGLOBIN GLYCOSYLATED A1C: CPT | Performed by: INTERNAL MEDICINE

## 2022-07-14 ENCOUNTER — OFFICE VISIT (OUTPATIENT)
Dept: INTERNAL MEDICINE | Facility: CLINIC | Age: 27
End: 2022-07-14
Payer: COMMERCIAL

## 2022-07-14 ENCOUNTER — PATIENT MESSAGE (OUTPATIENT)
Dept: INTERNAL MEDICINE | Facility: CLINIC | Age: 27
End: 2022-07-14

## 2022-07-14 VITALS
DIASTOLIC BLOOD PRESSURE: 70 MMHG | BODY MASS INDEX: 45.41 KG/M2 | HEIGHT: 61 IN | SYSTOLIC BLOOD PRESSURE: 122 MMHG | HEART RATE: 86 BPM | OXYGEN SATURATION: 100 % | WEIGHT: 240.5 LBS

## 2022-07-14 DIAGNOSIS — H53.8 BLURRY VISION: ICD-10-CM

## 2022-07-14 DIAGNOSIS — E11.9 TYPE 2 DIABETES MELLITUS WITHOUT COMPLICATION, WITHOUT LONG-TERM CURRENT USE OF INSULIN: Primary | ICD-10-CM

## 2022-07-14 DIAGNOSIS — E66.01 CLASS 3 SEVERE OBESITY DUE TO EXCESS CALORIES WITH SERIOUS COMORBIDITY AND BODY MASS INDEX (BMI) OF 45.0 TO 49.9 IN ADULT: ICD-10-CM

## 2022-07-14 DIAGNOSIS — G43.909 MIGRAINE WITHOUT STATUS MIGRAINOSUS, NOT INTRACTABLE, UNSPECIFIED MIGRAINE TYPE: ICD-10-CM

## 2022-07-14 PROCEDURE — 1159F PR MEDICATION LIST DOCUMENTED IN MEDICAL RECORD: ICD-10-PCS | Mod: CPTII,S$GLB,, | Performed by: INTERNAL MEDICINE

## 2022-07-14 PROCEDURE — 3072F LOW RISK FOR RETINOPATHY: CPT | Mod: CPTII,S$GLB,, | Performed by: INTERNAL MEDICINE

## 2022-07-14 PROCEDURE — 3044F HG A1C LEVEL LT 7.0%: CPT | Mod: CPTII,S$GLB,, | Performed by: INTERNAL MEDICINE

## 2022-07-14 PROCEDURE — 3008F BODY MASS INDEX DOCD: CPT | Mod: CPTII,S$GLB,, | Performed by: INTERNAL MEDICINE

## 2022-07-14 PROCEDURE — 1160F PR REVIEW ALL MEDS BY PRESCRIBER/CLIN PHARMACIST DOCUMENTED: ICD-10-PCS | Mod: CPTII,S$GLB,, | Performed by: INTERNAL MEDICINE

## 2022-07-14 PROCEDURE — 3078F PR MOST RECENT DIASTOLIC BLOOD PRESSURE < 80 MM HG: ICD-10-PCS | Mod: CPTII,S$GLB,, | Performed by: INTERNAL MEDICINE

## 2022-07-14 PROCEDURE — 99214 PR OFFICE/OUTPT VISIT, EST, LEVL IV, 30-39 MIN: ICD-10-PCS | Mod: S$GLB,,, | Performed by: INTERNAL MEDICINE

## 2022-07-14 PROCEDURE — 99999 PR PBB SHADOW E&M-EST. PATIENT-LVL V: ICD-10-PCS | Mod: PBBFAC,,, | Performed by: INTERNAL MEDICINE

## 2022-07-14 PROCEDURE — 99214 OFFICE O/P EST MOD 30 MIN: CPT | Mod: S$GLB,,, | Performed by: INTERNAL MEDICINE

## 2022-07-14 PROCEDURE — 3074F PR MOST RECENT SYSTOLIC BLOOD PRESSURE < 130 MM HG: ICD-10-PCS | Mod: CPTII,S$GLB,, | Performed by: INTERNAL MEDICINE

## 2022-07-14 PROCEDURE — 3078F DIAST BP <80 MM HG: CPT | Mod: CPTII,S$GLB,, | Performed by: INTERNAL MEDICINE

## 2022-07-14 PROCEDURE — 3008F PR BODY MASS INDEX (BMI) DOCUMENTED: ICD-10-PCS | Mod: CPTII,S$GLB,, | Performed by: INTERNAL MEDICINE

## 2022-07-14 PROCEDURE — 3072F PR LOW RISK FOR RETINOPATHY: ICD-10-PCS | Mod: CPTII,S$GLB,, | Performed by: INTERNAL MEDICINE

## 2022-07-14 PROCEDURE — 99999 PR PBB SHADOW E&M-EST. PATIENT-LVL V: CPT | Mod: PBBFAC,,, | Performed by: INTERNAL MEDICINE

## 2022-07-14 PROCEDURE — 1160F RVW MEDS BY RX/DR IN RCRD: CPT | Mod: CPTII,S$GLB,, | Performed by: INTERNAL MEDICINE

## 2022-07-14 PROCEDURE — 3074F SYST BP LT 130 MM HG: CPT | Mod: CPTII,S$GLB,, | Performed by: INTERNAL MEDICINE

## 2022-07-14 PROCEDURE — 1159F MED LIST DOCD IN RCRD: CPT | Mod: CPTII,S$GLB,, | Performed by: INTERNAL MEDICINE

## 2022-07-14 PROCEDURE — 3044F PR MOST RECENT HEMOGLOBIN A1C LEVEL <7.0%: ICD-10-PCS | Mod: CPTII,S$GLB,, | Performed by: INTERNAL MEDICINE

## 2022-07-14 RX ORDER — ULIPRISTAL ACETATE 30 MG/1
TABLET ORAL
COMMUNITY
Start: 2022-06-30 | End: 2023-05-23

## 2022-07-14 RX ORDER — SUMATRIPTAN SUCCINATE 25 MG/1
25 TABLET ORAL ONCE AS NEEDED
Qty: 30 TABLET | Refills: 0 | Status: SHIPPED | OUTPATIENT
Start: 2022-07-14 | End: 2023-12-15

## 2022-07-14 NOTE — PROGRESS NOTES
Assessment:       1. Type 2 diabetes mellitus without complication, without long-term current use of insulin    2. Class 3 severe obesity due to excess calories with serious comorbidity and body mass index (BMI) of 45.0 to 49.9 in adult    3. Blurry vision    4. Migraine without status migrainosus, not intractable, unspecified migraine type        Plan:         Karen was seen today for diabetes.    Diagnoses and all orders for this visit:    Type 2 diabetes mellitus without complication, without long-term current use of insulin  Chronic  Controlled  Patient is at goal today   I have reviewed lifestyle modification to achieve/maintain goals  We will adjust the current medication regimen to    SWITCH METORMIN FOR SEMAGLUTIDE  Patient will follow up in 6 months   -     Hemoglobin A1C; Standing    Class 3 severe obesity due to excess calories with serious comorbidity and body mass index (BMI) of 45.0 to 49.9 in adult  Chronic  Uncontrolled  Patient is not at goal today  I have reviewed lifestyle modification to achieve/maintain goals  We will adjust the current medication regimen to   Patient will follow up in 6 months       4. Migraine without status migrainosus, not intractable, unspecified migraine type  new disease state  I reviewed the natural history of the disorder and possible complications  Reviewed the consequence of non-adherence to treatment regiment  I have reviewed lifestyle modification to achieve/maintain goals  I have reviewed the Risk/benefit and side effects of medications  We will proceed with medications as listed below  Reviewed goals for tx  reviewed signs and symptoms that should prompt return to provider or evaluation in the ED   Patient will follow up in 6 months  or sooner if necessary  - sumatriptan (IMITREX) 25 MG Tab; Take 1 tablet (25 mg total) by mouth once as needed for Migraine.  Dispense: 30 tablet; Refill: 0          Subjective:       Patient ID: Karen Paredes is a 26 y.o.  female.    Chief Complaint: Diabetes      Interim Hx  Patient was seen in urgent care for the referral URI symptoms    Concerns today    Patient reports headaches chronically since high school.  She states her headaches have not changed character,  frequency duration and or intensity.  Her typical headaches are brought on by lack of sleep.  She has a mother with history of migraines.  She reports her headache started gradually lasting hours to 24 hours.  She typically turns the lights off goes to sleep for the headaches to self discontinue were she will take over-the-counter medication.  She denies any aura.  She has no focal neurologic signs or no headache.  Headaches are usually unilateral and associated with photophobia and nausea.  She has headaches up to frequency of twice weekly.  They are not impairing.  No symptoms of fevers night sweats.  She had a recent eye exam for which he got glasses.  She reports dilation and retinal exam with out abnormality.         Chronic Problems     DM assessment  Complication; none  Medication:adherent to meds below     - hba1c -   Lab Results   Component Value Date    HGBA1C 5.8 (H) 07/08/2022    HGBA1C 5.6 12/23/2021    HGBA1C 6.6 (H) 09/17/2021      - umacr -   Lab Results   Component Value Date    MICALBCREAT 12.4 09/30/2021     Vision/Podiatry: UTD/overdue  Preventative: _[id number]_PPS23 on, Statin, ARB        Health Maintenance Due   Topic Date Due    Foot Exam  Never done    COVID-19 Vaccine (3 - Booster for Pfizer series) 05/29/2022          Health Maintenance Topics with due status: Not Due       Topic Last Completion Date    Lipid Panel 09/17/2021    TETANUS VACCINE 09/30/2021    Diabetes Urine Screening 09/30/2021    Pneumococcal Vaccines (Age 0-64) 09/30/2021    Eye Exam 10/07/2021    Pap Smear 12/03/2021    Hemoglobin A1c 07/08/2022    Influenza Vaccine Not Due           HPI  Review of Systems   All other systems reviewed and are negative.        Objective:     BP  "122/70 (BP Location: Right arm, Patient Position: Sitting, BP Method: Large (Manual))   Pulse 86   Ht 5' 1" (1.549 m)   Wt 109.1 kg (240 lb 8.4 oz)   LMP 07/14/2022   SpO2 100%   BMI 45.45 kg/m²       Vitals 7/14/2022 2/23/2022 12/29/2021 9/17/2021 8/18/2021   Height 61 61 61 61 61   Weight (lbs) 240.52 229 229.72 238.32 231.48   BMI (kg/m2) 45.5 43.3 43.4 45.1 43.8          Physical Exam  Vitals and nursing note reviewed.   Constitutional:       General: She is not in acute distress.     Appearance: She is well-developed. She is not diaphoretic.   HENT:      Head: Normocephalic.      Nose: Nose normal.   Eyes:      General:         Right eye: No discharge.         Left eye: No discharge.      Conjunctiva/sclera: Conjunctivae normal.      Pupils: Pupils are equal, round, and reactive to light.   Cardiovascular:      Rate and Rhythm: Normal rate and regular rhythm.      Heart sounds: Normal heart sounds. No murmur heard.    No friction rub. No gallop.   Pulmonary:      Effort: Pulmonary effort is normal. No respiratory distress.   Abdominal:      General: Bowel sounds are normal. There is no distension.      Palpations: Abdomen is soft.   Musculoskeletal:         General: No deformity. Normal range of motion.      Cervical back: Normal range of motion.   Skin:     General: Skin is warm.   Neurological:      Mental Status: She is alert and oriented to person, place, and time.      Cranial Nerves: No cranial nerve deficit.             Future Appointments   Date Time Provider Department Center   1/14/2023  8:45 AM LAB, [facility]   1/18/2023  8:20 AM Kaleb Mchugh III, MD [facility]         Medication List with Changes/Refills   Current Medications    BLOOD SUGAR DIAGNOSTIC STRP    1 each by Misc.(Non-Drug; Combo Route) route once daily.    BLOOD-GLUCOSE METER KIT    Use as instructed    BLOOD-GLUCOSE METER MISC    as per administration instructions    JEAN 30 MG TABLET        ETONOGESTREL " (NEXPLANON SD)    by Subdermal route.    FLUTICASONE PROPIONATE (FLONASE) 50 MCG/ACTUATION NASAL SPRAY    1 spray (50 mcg total) by Each Nostril route once daily.    LANCETS MISC    1 each by Misc.(Non-Drug; Combo Route) route once daily.    METFORMIN (GLUCOPHAGE) 500 MG TABLET    TAKE 1 TABLET(500 MG) BY MOUTH TWICE DAILY WITH MEALS    VALACYCLOVIR (VALTREX) 500 MG TABLET    Take 500 mg by mouth 2 (two) times daily.         Disclaimer:  This note has been generated using voice-recognition software. There may be grammatical or spelling errors that have been missed during proof-reading

## 2022-07-14 NOTE — PATIENT INSTRUCTIONS
We were happy to see you today    For your Testing  Please have your labs and/or imaging test done  prior to your next        For your Medication   We are switch to rybelsus instead of metformin for the diabetes and weight loss  You can try imitrex for the headache   For more information about side effects please visit medlineplus.gov        For your Referrals  none      For your Vaccinations  We gave your the following vaccines    Please obtain the following vaccines at the pharmacy          Please return to clinic in    No follow-ups on file.        Extra resources

## 2022-07-16 ENCOUNTER — PATIENT MESSAGE (OUTPATIENT)
Dept: INTERNAL MEDICINE | Facility: CLINIC | Age: 27
End: 2022-07-16
Payer: COMMERCIAL

## 2022-07-16 DIAGNOSIS — E11.9 TYPE 2 DIABETES MELLITUS WITHOUT COMPLICATION, WITHOUT LONG-TERM CURRENT USE OF INSULIN: Primary | ICD-10-CM

## 2022-08-01 ENCOUNTER — TELEPHONE (OUTPATIENT)
Dept: INTERNAL MEDICINE | Facility: CLINIC | Age: 27
End: 2022-08-01
Payer: COMMERCIAL

## 2022-08-01 ENCOUNTER — TELEPHONE (OUTPATIENT)
Dept: PHARMACY | Facility: CLINIC | Age: 27
End: 2022-08-01
Payer: COMMERCIAL

## 2022-08-01 RX ORDER — TOPIRAMATE SPINKLE 25 MG/1
25 CAPSULE ORAL 2 TIMES DAILY
Qty: 60 CAPSULE | Refills: 11 | Status: SHIPPED | OUTPATIENT
Start: 2022-08-01 | End: 2023-12-15

## 2022-08-01 NOTE — TELEPHONE ENCOUNTER
----- Message from Nikunj Miller sent at 8/1/2022 11:54 AM CDT -----  Contact: pt  .Type:  Needs Medical Advice    Who Called: pt    Pharmacy name and phone #:  [facility]   Phone: [phone]  Fax:  [phone]  Would the patient rather a call back or a response via [facility]? Call back  Best Call Back Number: [phone]   Additional Information: Pt. Is requesting a cheaper medication because of her insurance deductible remaining semaglutide (RYBELSUS) 3 mg tablet

## 2022-08-01 NOTE — TELEPHONE ENCOUNTER
Informed the patient the provider sent over a new medication for her and advised her to call her pharmacy to confirm for  time.Pt voiced understanding.

## 2022-09-28 DIAGNOSIS — E11.9 TYPE 2 DIABETES MELLITUS WITHOUT COMPLICATION: ICD-10-CM

## 2022-10-03 ENCOUNTER — PATIENT MESSAGE (OUTPATIENT)
Dept: ADMINISTRATIVE | Facility: HOSPITAL | Age: 27
End: 2022-10-03
Payer: COMMERCIAL

## 2022-10-10 ENCOUNTER — PATIENT MESSAGE (OUTPATIENT)
Dept: ADMINISTRATIVE | Facility: HOSPITAL | Age: 27
End: 2022-10-10
Payer: COMMERCIAL

## 2022-11-04 ENCOUNTER — TELEPHONE (OUTPATIENT)
Dept: BARIATRICS | Facility: CLINIC | Age: 27
End: 2022-11-04
Payer: COMMERCIAL

## 2022-11-07 ENCOUNTER — TELEPHONE (OUTPATIENT)
Dept: BARIATRICS | Facility: CLINIC | Age: 27
End: 2022-11-07
Payer: COMMERCIAL

## 2022-11-09 ENCOUNTER — TELEPHONE (OUTPATIENT)
Dept: BARIATRICS | Facility: CLINIC | Age: 27
End: 2022-11-09
Payer: COMMERCIAL

## 2022-11-15 ENCOUNTER — TELEPHONE (OUTPATIENT)
Dept: BARIATRICS | Facility: CLINIC | Age: 27
End: 2022-11-15
Payer: COMMERCIAL

## 2022-12-15 DIAGNOSIS — E11.9 TYPE 2 DIABETES MELLITUS WITHOUT COMPLICATION: ICD-10-CM

## 2022-12-19 ENCOUNTER — PATIENT MESSAGE (OUTPATIENT)
Dept: ADMINISTRATIVE | Facility: HOSPITAL | Age: 27
End: 2022-12-19
Payer: COMMERCIAL

## 2023-01-17 ENCOUNTER — PATIENT MESSAGE (OUTPATIENT)
Dept: ADMINISTRATIVE | Facility: HOSPITAL | Age: 28
End: 2023-01-17
Payer: COMMERCIAL

## 2023-01-20 NOTE — PROGRESS NOTES
Subjective:       Patient ID: Karen Paredes is a 27 y.o. female.    Chief Complaint: Consult    CC:weight    New pt to me, referred by Aaareferral Self  No address on file , with Patient Active Problem List:     Obesity     Tonsillitis     Type 2 diabetes mellitus with other specified complication, without long-term current use of insulin- Rxed rybelsus 3mg but did not start it. Did take metformin, but has not taken it recently. Would get GI upset.      Lab Results       Component                Value               Date                       HGBA1C                   5.8 (H)             07/08/2022                 HGBA1C                   5.6                 12/23/2021                 HGBA1C                   6.6 (H)             09/17/2021            Lab Results       Component                Value               Date                       LDLCALC                  77.4                09/17/2021                 CREATININE               0.8                 07/08/2022                   Current attempts at weight loss: Changed diet for DM2. Was not losing weight, but A1c improved. 1 hour work out with cardio and weights 3 x a week.     Previous diet attempts: Denies.     History of medication for loss:   checked today. Denies.     Heaviest weight:245#    Lightest weight: 180#    Goal weight: 180#      Last eye exam:      2021. No retinopathy. No glaucoma.   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 Lives with mom and sister. Pt cooks.   Breakfast:  2 eggs and granola bar. Weekends: same or skips.     lunch: salmon or chicken breast with rice and corn. Weekends: same. Or popeyes or pasta.       dinner: same as lunch.     snacks:  cashews and granola    Beverages: Juice. Water. Kalina 2 x a week.     Willingness to change:     Cardiac studies:   Normal sinus rhythm with sinus arrhythmia   Nonspecific T wave abnormality   Abnormal ECG   No previous ECGs available    BMR: 1448    PBF:   53.2%    Review of Systems      Objective:    /72   Pulse 89   Ht 5' (1.524 m)   Wt 106.5 kg (234 lb 12.8 oz)   SpO2 99%   BMI 45.86 kg/m²    Physical Exam  Vitals reviewed.   Constitutional:       General: She is not in acute distress.     Appearance: She is well-developed.      Comments: With severe obesity     HENT:      Head: Normocephalic and atraumatic.   Eyes:      General: No scleral icterus.     Pupils: Pupils are equal, round, and reactive to light.   Neck:      Thyroid: No thyromegaly.   Cardiovascular:      Rate and Rhythm: Normal rate.      Heart sounds: Normal heart sounds. No murmur heard.    No friction rub. No gallop.   Pulmonary:      Effort: Pulmonary effort is normal. No respiratory distress.      Breath sounds: Normal breath sounds. No wheezing.   Abdominal:      General: Bowel sounds are normal. There is no distension.      Palpations: Abdomen is soft.      Tenderness: There is no abdominal tenderness.   Musculoskeletal:         General: Normal range of motion.      Cervical back: Normal range of motion and neck supple.   Skin:     General: Skin is warm and dry.      Findings: No erythema.   Neurological:      Mental Status: She is alert and oriented to person, place, and time.      Cranial Nerves: No cranial nerve deficit.   Psychiatric:         Behavior: Behavior normal.         Judgment: Judgment normal.       Assessment:       Problem List Items Addressed This Visit       Obesity - Primary    Type 2 diabetes mellitus with other specified complication, without long-term current use of insulin    Relevant Medications    semaglutide (OZEMPIC) 0.25 mg or 0.5 mg(2 mg/1.5 mL) pen injector       Plan:           1. Class 3 severe obesity due to excess calories with serious comorbidity and body mass index (BMI) of 45.0 to 49.9 in adult  1000 sobedia pb meal planner, meal ideas given.      2. Type 2 diabetes mellitus with other specified complication, without long-term current use of  insulin  GI intolerance to metformin.   - semaglutide (OZEMPIC) 0.25 mg or 0.5 mg(2 mg/1.5 mL) pen injector; Inject 0.5 mg into the skin every 7 days.  Dispense: 3 pen; Refill: 0    Start Ozempic once a week. Start with 0.25mg once a week x 4 weeks, then 0.5 mg weekly.       Decrease portions as soon as you start Ozempic. Avoid fried, greasy, fatty foods.     Some nausea in the first 2 weeks is not unusual.     If you get pain across the upper abdomen and around to your back, please call the office.       [email or web address] for coupon/videos.

## 2023-01-23 ENCOUNTER — OFFICE VISIT (OUTPATIENT)
Dept: BARIATRICS | Facility: CLINIC | Age: 28
End: 2023-01-23
Payer: COMMERCIAL

## 2023-01-23 VITALS
HEART RATE: 89 BPM | BODY MASS INDEX: 46.1 KG/M2 | DIASTOLIC BLOOD PRESSURE: 72 MMHG | SYSTOLIC BLOOD PRESSURE: 112 MMHG | OXYGEN SATURATION: 99 % | HEIGHT: 60 IN | WEIGHT: 234.81 LBS

## 2023-01-23 DIAGNOSIS — E11.69 TYPE 2 DIABETES MELLITUS WITH OTHER SPECIFIED COMPLICATION, WITHOUT LONG-TERM CURRENT USE OF INSULIN: ICD-10-CM

## 2023-01-23 DIAGNOSIS — E66.01 CLASS 3 SEVERE OBESITY DUE TO EXCESS CALORIES WITH SERIOUS COMORBIDITY AND BODY MASS INDEX (BMI) OF 45.0 TO 49.9 IN ADULT: Primary | ICD-10-CM

## 2023-01-23 PROCEDURE — 3008F BODY MASS INDEX DOCD: CPT | Mod: CPTII,S$GLB,, | Performed by: INTERNAL MEDICINE

## 2023-01-23 PROCEDURE — 99999 PR PBB SHADOW E&M-EST. PATIENT-LVL IV: CPT | Mod: PBBFAC,,, | Performed by: INTERNAL MEDICINE

## 2023-01-23 PROCEDURE — 1160F RVW MEDS BY RX/DR IN RCRD: CPT | Mod: CPTII,S$GLB,, | Performed by: INTERNAL MEDICINE

## 2023-01-23 PROCEDURE — 1160F PR REVIEW ALL MEDS BY PRESCRIBER/CLIN PHARMACIST DOCUMENTED: ICD-10-PCS | Mod: CPTII,S$GLB,, | Performed by: INTERNAL MEDICINE

## 2023-01-23 PROCEDURE — 1159F PR MEDICATION LIST DOCUMENTED IN MEDICAL RECORD: ICD-10-PCS | Mod: CPTII,S$GLB,, | Performed by: INTERNAL MEDICINE

## 2023-01-23 PROCEDURE — 99999 PR PBB SHADOW E&M-EST. PATIENT-LVL IV: ICD-10-PCS | Mod: PBBFAC,,, | Performed by: INTERNAL MEDICINE

## 2023-01-23 PROCEDURE — 99215 OFFICE O/P EST HI 40 MIN: CPT | Mod: S$GLB,,, | Performed by: INTERNAL MEDICINE

## 2023-01-23 PROCEDURE — 3074F PR MOST RECENT SYSTOLIC BLOOD PRESSURE < 130 MM HG: ICD-10-PCS | Mod: CPTII,S$GLB,, | Performed by: INTERNAL MEDICINE

## 2023-01-23 PROCEDURE — 1159F MED LIST DOCD IN RCRD: CPT | Mod: CPTII,S$GLB,, | Performed by: INTERNAL MEDICINE

## 2023-01-23 PROCEDURE — 3078F PR MOST RECENT DIASTOLIC BLOOD PRESSURE < 80 MM HG: ICD-10-PCS | Mod: CPTII,S$GLB,, | Performed by: INTERNAL MEDICINE

## 2023-01-23 PROCEDURE — 99215 PR OFFICE/OUTPT VISIT, EST, LEVL V, 40-54 MIN: ICD-10-PCS | Mod: S$GLB,,, | Performed by: INTERNAL MEDICINE

## 2023-01-23 PROCEDURE — 3008F PR BODY MASS INDEX (BMI) DOCUMENTED: ICD-10-PCS | Mod: CPTII,S$GLB,, | Performed by: INTERNAL MEDICINE

## 2023-01-23 PROCEDURE — 3078F DIAST BP <80 MM HG: CPT | Mod: CPTII,S$GLB,, | Performed by: INTERNAL MEDICINE

## 2023-01-23 PROCEDURE — 3074F SYST BP LT 130 MM HG: CPT | Mod: CPTII,S$GLB,, | Performed by: INTERNAL MEDICINE

## 2023-01-23 RX ORDER — SEMAGLUTIDE 1.34 MG/ML
0.5 INJECTION, SOLUTION SUBCUTANEOUS
Qty: 3 PEN | Refills: 0 | Status: SHIPPED | OUTPATIENT
Start: 2023-01-23 | End: 2023-05-23

## 2023-01-23 NOTE — PATIENT INSTRUCTIONS
"Start Ozempic once a week. Start with 0.25mg once a week x 4 weeks, then 0.5 mg weekly.       Decrease portions as soon as you start Ozempic. Avoid fried, greasy, fatty foods.     Some nausea in the first 2 weeks is not unusual.     If you get pain across the upper abdomen and around to your back, please call the office.       [email or web address] for coupon/videos.           1000 calorie  Meal Plan  STARCHES 80 CALORIES PER SERVING 15g CARB, 3g PROTEIN, 1g FAT  Servings per day   bread   tortilla   crackers   cooked cereals   dry cereals   pasta   rice   corn   popcorn   potato (small)   potato, mashed   sweet potato   squash, winter   cooked beans, peas, lentils (add 1 meat exchange)   1 slice   1 (6")   4-6 (3/4 oz)   1/2 cup   3/4 cup   1/2 cup   1/3 cup  1/2 cup   1 small light bag  1 (3 oz)   1/2 cup   1/3 cup   1 cup   1/2 cup Most starches are a good source of B vitamins   Choose whole grain foods such as 100% whole wheat bread and flour, brown rice, tortillas, etc. for nutrients and fiber.   Combine beans (starch & meat) with grains (starch) for their complimentary proteins and fiber   Combine grains (starch) with milk (milk) or cheese (meat) to compliment proteins     2   FRUIT 60 CALORIES PER SERVING 15g CARB    fresh fruit   banana  melon (cubes)   berries  canned fruit   dried fruit    1 small   1/2  ½ cup   ¾ cup  ½ cup   ¼ cup    Choose whole fruits for fiber   No fruit juices   2   DAIRY  CALORIES PER SERVING 12g CARB, 8g PROTEIN, 0-8g FAT    milk   yogurt  Protein soy or almond milk 1 cup   1 cup   1 cup Use unsweetened almond or soy milk with added protein  Avoid chocolate or flavored milk  Avoid yogurt with more than 8 gms of sugar. Gms of protein should be higher than grams of sugar               1   MEAT AND SUBSTITUES  CALORIES PER SERVING 7g Protein, 0-13g FAT    Meat,Seafood and fish   cheese   cottage cheese   egg   peanut butter   tofu or tempeh  cooked beans, peas, lentils " (add 1 starch)  Quinoa (add 1 starch)   Nuts and seeds (½ serving protein + 1 fat)  Nutritional yeast  Morning Star grillers 1 oz     1 oz  1/4 cup     1   1.5 Tbsp   4 oz (1/2 cup)   1/2 cup              ¼ cup            2 tablespoons    1 corin or ½ cup      Choose fish, seafood and lower fat cheeses  Limit frying or adding fat.      8   FATS 45 CALORIES PER SERVING     oil   mayonnaise   cream cheese   salad dressing   peanuts   avocado   butter or margarine    1 tsp   1 tsp   1 Tbsp   1 Tbsp   10   1/8   1 tsp Eat less fat.   Eat less saturated fat such as animal fat found in fatter meat, cheese, and butter. Also eat less hydrogenated fat.      2-3   VEGETABLES 25 CALORIES PER SERVING 5 g CARB, 2g PROTEIN    raw vegetables   cooked vegetables   tomato or vegetable juice 1 cup   1/2 cup     1/2 cup Choose dark green leafy and deep yellow vegetables such as spinach, zuchinni, squash, mushrooms, cauliflower ,broccoli, carrots, and peppers.   Unlimited         1000 CALORIE MEAL PLAN  Eat 3 small meals per day with 1-2 small snacks to keep you full throughout the day  Aim for at least 15 gms of protein at breakfast, at least 25 grams at lunch and at least 25 grams of protein at dinner.  Snacks should contain at least 5 grams of protein  Limit starches to 1 serving per meal or snack.  Keep your carbs whole grain or whole wheat  Limit your intake of refined sugar including sugary beverages ie sweet tea, lemonade, fruit punch             Fruit Protein Dairy Starch Fats Calories   Breakfast 1 2    370   Lunch  3  1 1 290   Dinner 1 3  1 1 315   Snack   1   120   Total 2 8 1 2 2 1085     Sample breakfasts:  2 scrambled eggs (use spray), 1 cup Protein Silk soy milk, 1 slice whole wheat toast, 1 small orange  Omelet made with 1 egg, 1-ounce low fat cheese and non-starchy veggies, 1 low fat plain yogurt with ½ banana  Plain yogurt with ¾ cup unsweetened cold cereal and ½ cup fresh fruit salad, 2 hardboiled eggs  Sample  lunches:  ½ whole wheat bagel topped with 3 ounces of low fat cheese melted in oven with a wild green salad with 1 TBSP dressing  ¾ cup cooked beans with 6 whole grain crackers, 10 roasted peanuts  ½ medium baked potato with 3 ounces of low fat cheddar cheese and 1 TBSP  sour cream  1 small wheat tortilla brushed with 1 tsp olive oil then brushed with pizza sauce and 4 ounces low fat cheese, sliced mushrooms and green peppers broiled until cheese is melted.  ½ cup chopped melon    Sample Dinners:  4 ounces of tuna pan seared in 1 tsp olive oil, ½ baked small sweet potato and 2 small plums  ½ cup chick peas, 1 cup cauliflower sauteed with 1 tbsp chopped onion, 1 tsp oil, and 2 tsp lange powder. Add low sodium vegetable stock to desired consistency. Serve with ½ cup brown rice  Red beans seasoned with onions and bell peppers served over cauliflower rice  1 cup cooked green or brown lentils served with ½ cup cooked quinoa and chopped tomatoes and cucumbers and 1 tbsp feta cheese  Sample Snacks:  1 cup of Protein Silk Soy milk with 3 squares vidhi crackers  3/4 ounce Triscuits with 1 ounce cubed cheese  Chobani Triple Zero yogurt with ¾ cup unsweetened cereal  ½ cup edamame (shelled)        Meal Ideas for Regular Bariatric Diet  *Recipes and products available at www.bariatriceating.com      Breakfast: (15-20g protein)    - Egg white omelet: 2 egg whites or ½ cup Egg Beaters. (Optional proteins: cheese, shrimp, black beans, chicken, sliced turkey) (Optional veggies: tomatoes, salsa, spinach, mushrooms, onions, green peppers, or small slice avocado)     - Egg and sausage: 1 egg or ¼ cup Egg Beaters (any variety), with 1 corin or 2 links of Turkey sausage or Veggie breakfast sausage (Remberto Farms or Boca)    - Crust-less breakfast quiche: To make a glass pie dish, mix 4oz part skim Ricotta, 1 cup skim milk, and 2 eggs as your base. Add protein: shredded cheese, sliced lean ham or turkey, turkey noyola/sausage. Add  veggies: tomato, onion, green onion, mushroom, green pepper, spinach, etc.    - Yogurt parfait: Mix 1 - 6oz container Dannon Light N Fit vanilla yogurt, with ¼ cup Kashi Go Lean cereal    - Cottage cheese and fruit: ½ cup part-skim cottage cheese or ricotta cheese topped with fresh fruit or sugar free preserves     - Barby Capellan's Vanilla Egg custard* (add 2 Tbsp instant coffee granules to make Cappuccino Custard*)    - Hi-Protein café latte (skim milk, decaf coffee, 1 scoop protein powder). Optional to add Sugar free syrup or extract flavoring.    Lunch: (20-30g protein)    - ½ cup Black bean soup (Homemade or Progresso), with ¼ cup shredded low-fat cheese. Top with chopped tomato or fresh salsa.     - Lean deli turkey breast and low-fat sliced cheese, mustard or light kaur to moisten, rolled up together, or wrapped in a Pranay lettuce leaf    - Chicken salad made from dinner leftovers, moisten with low-fat salad dressing or light kaur. Also try leftover salmon, shrimp, tuna or boiled eggs. Serve ½ cup over dark green salad    - Fat-free canned refried beans, topped with ¼ cup shredded low-fat cheese. Top with chopped tomato or fresh salsa.     - Greek salad: Top mixed greens with 1-2oz grilled chicken, tomatoes, red onions, 2-3 kalamata olives, and sprinkle lightly with feta cheese. Spritz with Balsamic vinegar to taste.     - Crust-less lunch quiche: To make a glass pie dish, mix 4oz part skim Ricotta, 1 cup skim milk, and 2 eggs as your base. Add protein: shredded cheese, sliced lean ham or turkey, shrimp, chicken. Add veggies: tomato, onion, green onion, mushroom, green pepper, spinach, artichoke, broccoli, etc.    - Pizza bake: tomato sauce, low-fat shredded mozzarella and turkey pepperoni or Knox noyola. Add any veggies.    - Cucumber crab bites: Spread ¼ cup crab dip (lump crabmeat + light cream cheese and green onions) over sliced cucumber.     - Chicken with light spinach and artichoke dip*: Puree in  : 6oz cooked and drained spinach, 2 cloves garlic, 1 can cannelloni beans, ½ cup chopped green onions, 1 can drained artichoke hearts (not marinated in oil), lemon juice and basil. Mix in 2oz chopped up chicken.    Supper: (20-30g protein)    - Serve grilled fish over dark green salad tossed with low-fat dressing, served with grilled asparagus pozo     - Rotisserie chicken salad: served with sliced strawberries, walnuts, fat-free feta cheese crumbles and 1 tbsp Guerreros Own Light Raspberry [place] Vinaigrette    - Shrimp cocktail: Dip cold boiled shrimp in homemade low-sugar cocktail sauce (1/2 cup Tamar One Carb ketchup, 2 tbsp horseradish, 1/4 tsp hot sauce, 1 tsp Worcestershire sauce, 1 tbsp freshly-squeezed lemon juice). Serve with dark green salad, walnuts, and crumbled blue cheese drizzled with olive oil and Balsamic vinegar    - Tuna Melt: Spread tuna salad onto 2 thick slices of tomato. Top with low-fat cheese and broil until cheese is melted. May also be made with chicken salad of shrimp salad. [place] with different types of cheeses.    - Homemade low-fat Chili using extra lean ground beef or ground turkey. Top with shredded cheese and salsa as desired. May add dollop fat-free sour cream if desired    - Dinner Omelet with shrimp or chicken and onion, green peppers and chives.    - No noodle lasagna: Use sliced zucchini or eggplant in place of noodles.  Layer with part skim ricotta cheese and low sugar meat sauce (use very lean ground beef or ground turkey).    - Mexican chicken bake: Bake chunks of chicken breast or thigh with taco seasoning, Pace brand enchilada sauce, green onions and low-fat cheese. Serve with ¼ cup black beans or fat free refried beans topped with chopped tomatoes or salsa.    - Gladis frozen meatballs, simmered in Classico Marinara sauce. Different flavors of salsa or spaghetti sauce create different dishes! Sprinkle with parmesan cheese. Serve with grilled or  steamed veggies, or a dark green salad.    - Simmer boneless skinless chicken thigh chunks in Classico Marinara sauce or roasted salsa until tender with chopped onion, bell pepper, garlic, mushrooms, spinach, etc.     - Hamburger, without the bun, dressed the way you like. Served with grilled or steamed veggies.    - Eggplant parmesan: Bake slices of eggplant at 350 degrees for 15 minutes. Layer tomato sauce, sliced eggplant and low-fat mozzarella cheese in a baking dish and cover with foil. Bake 30-40 more minutes or until bubbly. Uncover and bake at 400 degrees for about 15 more minutes, or until top is slightly crisp.    - Fish tacos: grilled/baked white fish, wrapped in Pranay lettuce leaf, topped with salsa, shredded low-fat cheese, and light coleslaw.    Snacks: (100-200 calories; >5g protein)    - 1 low-fat cheese stick with 8 cherry tomatoes or 1 serving fresh fruit  - 4 thin slices fat-free turkey breast and 1 slice low-fat cheese  - 4 thin slices fat-free honey ham with wedge of melon  - 1/4 cup unsalted nuts with ½ cup fruit  - 6-oz container Dannon Light n Fit vanilla yogurt, topped with 1oz unsalted nuts         - apple, celery or baby carrots spread with 2 Tbsp natural peanut butter or almond butter   - apple slices with 1 oz slice low-fat cheese  - celery, cucumber, bell pepper or baby carrots dipped in ¼ cup hummus bean spread or light spinach and artichoke dip (*recipe in lunch section)  - 100 calorie bag microwave light popcorn with 3 tbsp grated parmesan cheese  - Radu Links Beef Steak - 14g protein! (similar to beef jerky)  - 2 wedges Laughing Cow - Light Herb & Garlic Cheese with sliced cucumber or green bell pepper  - 1/2 cup low-fat cottage cheese with ¼ cup fruit or ¼ cup salsa  - RTD Protein drinks: Atkins, Low Carb Slim Fast, EAS light, Muscle Milk Light, etc.  - Homemade Protein drinks: GNC Soy95, Isopure, Nectar, UNJURY, Whey Gourmet, etc. Mix 1 scoop powder with 8oz skim/1% milk or  light soymilk.  - Protein bars: Atkins, EAS, Pure Protein, Think Thin, Detour, etc. Must have 0-4 grams sugar - Read the label.    Takeout Options: No more than twice/week  Deli - Salads (no pasta or rice), meats, cheeses. Roasted chicken. Lox (salmon)    Mexican - Platters which don't include tortillas, chips, or rice. Go easy on the beans. Example: Fajitas without the tortillas. Ask the  not to bring chips to the table if they are too tempting.    Greek - Meat or fish and vegetable, but no bread or rice. Including hummus, baba ganoush, etc, is OK. Most sit-down Greek restaurants can provide you with cucumber slices for dipping instead of kelly bread.    Fast Food (Avoid as much as possible) - Salads (no croutons and limit salad dressing to 2 tbsp), grilled chicken sandwich without the bun and ask for no kaur. Quitas low fat chili or Taco Bell pintos and cheese.    BBQ - The meats are fine if you ask for sauces on the side, but most of the traditional side dishes are loaded with carbs. Mert slaw, baked beans and BBQ sauce are typically made with sugar.    Chinese - Nothing deep-fried, no rice or noodles. Many Chinese sauces have starch and sugar in them, so you'll have to use your judgement. If you find that these sauces trigger cravings, or cause Dumping, you can ask for the sauce to be made without sugar or just use soy sauce.

## 2023-02-01 ENCOUNTER — LAB VISIT (OUTPATIENT)
Dept: LAB | Facility: HOSPITAL | Age: 28
End: 2023-02-01
Attending: INTERNAL MEDICINE
Payer: COMMERCIAL

## 2023-02-01 DIAGNOSIS — E11.9 TYPE 2 DIABETES MELLITUS WITHOUT COMPLICATION, WITHOUT LONG-TERM CURRENT USE OF INSULIN: ICD-10-CM

## 2023-02-01 LAB
ESTIMATED AVG GLUCOSE: 131 MG/DL (ref 68–131)
HBA1C MFR BLD: 6.2 % (ref 4–5.6)

## 2023-02-01 PROCEDURE — 83036 HEMOGLOBIN GLYCOSYLATED A1C: CPT | Performed by: INTERNAL MEDICINE

## 2023-02-01 PROCEDURE — 36415 COLL VENOUS BLD VENIPUNCTURE: CPT | Mod: PO | Performed by: INTERNAL MEDICINE

## 2023-02-10 ENCOUNTER — PATIENT MESSAGE (OUTPATIENT)
Dept: INTERNAL MEDICINE | Facility: CLINIC | Age: 28
End: 2023-02-10
Payer: COMMERCIAL

## 2023-02-10 DIAGNOSIS — E11.9 TYPE 2 DIABETES MELLITUS WITHOUT COMPLICATION, WITHOUT LONG-TERM CURRENT USE OF INSULIN: Primary | ICD-10-CM

## 2023-02-14 ENCOUNTER — PATIENT MESSAGE (OUTPATIENT)
Dept: INTERNAL MEDICINE | Facility: CLINIC | Age: 28
End: 2023-02-14
Payer: COMMERCIAL

## 2023-02-22 ENCOUNTER — PATIENT MESSAGE (OUTPATIENT)
Dept: BARIATRICS | Facility: CLINIC | Age: 28
End: 2023-02-22
Payer: COMMERCIAL

## 2023-02-23 ENCOUNTER — TELEPHONE (OUTPATIENT)
Dept: BARIATRICS | Facility: CLINIC | Age: 28
End: 2023-02-23
Payer: COMMERCIAL

## 2023-02-23 NOTE — TELEPHONE ENCOUNTER
----- Message from Clarice Maria sent at 2/23/2023  8:06 AM CST -----  Contact: pt @ [phone]  Karen Paredes calling regarding Patient Advice (message) for #pt is calling to get prior auth for semaglutide (OZEMPIC) 0.25 mg or 0.5 mg(2 mg/1.5 mL) pen injector, needs to be sent over to insurance company. Pt requesting for medication be sent over to [id number] - [place] - [id number] AT [facility] [address] [[id number]]. Asking for call back

## 2023-02-23 NOTE — TELEPHONE ENCOUNTER
Contacted pharmacy staff in regard to Ozempic prescription. Staff stated that the PA approval for prescription Ozempic was not showing up on their end and was showing a copayment of $3000. I advised them that I would contact [facility] to find out further details about the PA. Call was disconnected.

## 2023-02-23 NOTE — TELEPHONE ENCOUNTER
Caryn from [facility] stated that the PA is showing approval on her end and they are in the process of finalizing, signing off and closing the PA. Staff stated that the PA should be closed by later this afternoon or tomorrow morning.     Called pt in regard to information that was gained from contacting [facility]. I advised pt that she should call [employer] back later the afternoon and/or early in the morning to inquire about the PA. Pt verbalized understanding and the call was disconnected.

## 2023-02-23 NOTE — TELEPHONE ENCOUNTER
Returned pt's call in regard to message and concerns about medication Ozempic. Pt stated that the pharmacy told her the a PA was still needed and that the medication was also on back order. I advised pt that the PA was showing approved on CoverMyMeds website. I told pt that I would contact the pharmacy to see what was the status of the prescription and give her a call back. Pt verbalized understanding and the call was disconnected.

## 2023-02-23 NOTE — TELEPHONE ENCOUNTER
----- Message from Clarice Maria sent at 2/23/2023  2:21 PM CST -----  Contact: pt @ [phone]  MIGUEL PARSONS calling regarding Patient Advice (message) for #pt returning call from Tamia, pt will be in a meeting from 3-3:30 asking for call back

## 2023-02-24 ENCOUNTER — PATIENT MESSAGE (OUTPATIENT)
Dept: ADMINISTRATIVE | Facility: HOSPITAL | Age: 28
End: 2023-02-24
Payer: COMMERCIAL

## 2023-02-24 ENCOUNTER — PATIENT OUTREACH (OUTPATIENT)
Dept: ADMINISTRATIVE | Facility: HOSPITAL | Age: 28
End: 2023-02-24
Payer: COMMERCIAL

## 2023-02-24 NOTE — PROGRESS NOTES
2023 Care Everywhere updates requested and reviewed.  Immunizations reconciled. Media reports reviewed.  Duplicate HM overrides and  orders removed.  Overdue HM topic chart audit and/or requested.  Overdue lab testing linked to upcoming lab appointments if applies.  Lab nehemiah, and LayerGloss reviewed     DIS reviewed    Portal outreached regarding Health maintenance   HM updated with external  report.  PAP 2021    Health Maintenance Due   Topic Date Due    Foot Exam  Never done    COVID-19 Vaccine (3 - Booster for Pfizer series) 2022    Influenza Vaccine (1) Never done    Lipid Panel  2022    Diabetes Urine Screening  2022    Pneumococcal Vaccines (Age 0-64) (2 - PCV) 2022    Eye Exam  10/07/2022

## 2023-03-01 ENCOUNTER — TELEPHONE (OUTPATIENT)
Dept: BARIATRICS | Facility: CLINIC | Age: 28
End: 2023-03-01
Payer: COMMERCIAL

## 2023-03-01 NOTE — TELEPHONE ENCOUNTER
Ozempic approved 02/23/2023-02/23/2024. [employer] [place].       Phone number: [phone] or [phone]

## 2023-03-10 ENCOUNTER — PATIENT MESSAGE (OUTPATIENT)
Dept: INTERNAL MEDICINE | Facility: CLINIC | Age: 28
End: 2023-03-10

## 2023-04-11 ENCOUNTER — PATIENT MESSAGE (OUTPATIENT)
Dept: ADMINISTRATIVE | Facility: HOSPITAL | Age: 28
End: 2023-04-11
Payer: COMMERCIAL

## 2023-05-11 ENCOUNTER — PATIENT OUTREACH (OUTPATIENT)
Dept: ADMINISTRATIVE | Facility: HOSPITAL | Age: 28
End: 2023-05-11
Payer: COMMERCIAL

## 2023-05-11 ENCOUNTER — PATIENT MESSAGE (OUTPATIENT)
Dept: ADMINISTRATIVE | Facility: HOSPITAL | Age: 28
End: 2023-05-11
Payer: COMMERCIAL

## 2023-05-12 ENCOUNTER — PATIENT OUTREACH (OUTPATIENT)
Dept: ADMINISTRATIVE | Facility: HOSPITAL | Age: 28
End: 2023-05-12
Payer: COMMERCIAL

## 2023-05-22 NOTE — PROGRESS NOTES
Assessment:       1. Type 2 diabetes mellitus with other specified complication, without long-term current use of insulin    2. Need for vaccination          Plan:         Karen was seen today for diabetes and follow-up.    Diagnoses and all orders for this visit:    Type 2 diabetes mellitus with other specified complication, without long-term current use of insulin  Chronic  Controlled  Patient is at goal today   I have reviewed lifestyle modification to achieve/maintain goals  We will continue the current medication regimen as listed below  Patient will follow up in 3 months   -     Basic Metabolic Panel; Future  -     Hemoglobin A1C; Future  -     Hepatic Function Panel; Future  -     Microalbumin/Creatinine Ratio, Urine; Future  -     Lipid Panel; Future  -     Basic Metabolic Panel; Standing  -     Hemoglobin A1C; Standing  -     Discontinue: semaglutide (OZEMPIC) 1 mg/dose (4 mg/3 mL); Inject 1 mg into the skin every 7 days.  -     semaglutide (OZEMPIC) 1 mg/dose (4 mg/3 mL); Inject 1 mg into the skin every 7 days.  -     semaglutide (OZEMPIC) 0.25 mg or 0.5 mg (2 mg/3 mL) pen injector; Inject 0.5 mg into the skin every 7 days.    Need for vaccination  -- I reviewed the patient vaccine history and provided the risk benefits and side effects of the vaccine.   -- I provided the patient a VIS sheet on the vaccine.   -     Pneumococcal Conjugate Vaccine (20 Valent) (IM)      Lets get some labs today  We can increase the ozempic to 1 mg weekly  You foot check is normal   I might try the oncould or the HOKA shoes  Please see the eye doctor   Lets catch you up on the pneumonia vaccine  Let follow up in 6 months with prelabs    Subjective:       Patient ID: Karen Paredes is a 27 y.o. female.    Chief Complaint: No chief complaint on file.      Interim Hx  Seen by bariatrics   Last seen by me in 7/2022    Concerns today    none    Chronic problems       Diabetes  She presents for her follow-up diabetic visit.  She has type 2 diabetes mellitus. Her disease course has been fluctuating. There are no hypoglycemic complications. Symptoms are stable. There are no diabetic complications. Current diabetic treatments: GLP1. She does not see a podiatrist.Eye exam is not current.     Patient Active Problem List   Diagnosis    Obesity    Tonsillitis    Type 2 diabetes mellitus with other specified complication, without long-term current use of insulin         Review of Systems   All other systems reviewed and are negative.          Health Maintenance Due   Topic Date Due    Foot Exam  - today Never done    COVID-19 Vaccine (3 - Booster for Pfizer series) 02/23/2022    Lipid Panel - labs today 09/17/2022    Diabetes Urine Screening - labs today 09/30/2022    Pneumococcal Vaccines (Age 0-64) (2 - PCV) 09/30/2022    Eye Exam - ordered today  10/07/2022         Objective:     /78 (BP Location: Right arm, Patient Position: Sitting, BP Method: Medium (Manual))   Pulse 85   Ht 5' (1.524 m)   Wt 108 kg (238 lb 1.6 oz)   SpO2 100%   BMI 46.50 kg/m²     Vitals 5/23/2023 1/23/2023 7/14/2022 2/23/2022 12/29/2021   Height 60 60 61 61 61   Weight (lbs) 238.1 234.8 240.52 229 229.72   BMI (kg/m2) 46.5 45.9 45.5 43.3 43.4          Physical Exam  Vitals and nursing note reviewed.   Constitutional:       General: She is not in acute distress.     Appearance: She is well-developed. She is not diaphoretic.   HENT:      Head: Normocephalic.      Nose: Nose normal.   Eyes:      General:         Right eye: No discharge.         Left eye: No discharge.      Conjunctiva/sclera: Conjunctivae normal.      Pupils: Pupils are equal, round, and reactive to light.   Cardiovascular:      Rate and Rhythm: Normal rate and regular rhythm.      Heart sounds: Normal heart sounds. No murmur heard.    No friction rub. No gallop.   Pulmonary:      Effort: Pulmonary effort is normal. No respiratory distress.   Abdominal:      General: Bowel sounds are normal.  There is no distension.      Palpations: Abdomen is soft.   Musculoskeletal:         General: No deformity. Normal range of motion.      Cervical back: Normal range of motion.   Skin:     General: Skin is warm.   Neurological:      Mental Status: She is alert and oriented to person, place, and time.      Cranial Nerves: No cranial nerve deficit.           No results found for this or any previous visit (from the past 336 hour(s)).      ASCVD  The ASCVD Risk score (Durga BALBUENA, et al., 2019) failed to calculate for the following reasons:    The 2019 ASCVD risk score is only valid for ages 40 to 79    Basic Labs    BMP  Lab Results   Component Value Date     07/08/2022    K 4.3 07/08/2022     07/08/2022    CO2 24 07/08/2022    BUN 6 07/08/2022    CREATININE 0.8 07/08/2022    CALCIUM 9.3 07/08/2022    ANIONGAP 7 (L) 07/08/2022    ESTGFRAFRICA >60.0 07/08/2022    EGFRNONAA >60.0 07/08/2022     No results found for: EGFRNORACEVR    Lab Results   Component Value Date    ALT 11 09/17/2021    AST 14 09/17/2021    ALKPHOS 65 09/17/2021    BILITOT 0.4 09/17/2021         No results found for: TSH  Lab Results   Component Value Date    WBC 6.58 09/24/2020    HGB 12.3 09/24/2020    HCT 39.1 09/24/2020    MCV 80 (L) 09/24/2020     09/24/2020             Lipids  Lab Results   Component Value Date    CHOL 130 09/17/2021     Lab Results   Component Value Date    HDL 35 (L) 09/17/2021     Lab Results   Component Value Date    LDLCALC 77.4 09/17/2021     Lab Results   Component Value Date    TRIG 88 09/17/2021     Lab Results   Component Value Date    CHOLHDL 26.9 09/17/2021       DM  Lab Results   Component Value Date    HGBA1C 6.2 (H) 02/01/2023       Future Appointments   Date Time Provider Department Center   11/22/2023  8:30 AM LAB, [facility] [place]   11/29/2023  9[facility] [place] [place]           Medication List with Changes/Refills   Current Medications    BLOOD SUGAR DIAGNOSTIC  STRP    1 each by Misc.(Non-Drug; Combo Route) route once daily.    BLOOD-GLUCOSE METER MISC    as per administration instructions    ETONOGESTREL (NEXPLANON SDRM)    by Subdermal route.    FLUTICASONE PROPIONATE (FLONASE) 50 MCG/ACTUATION NASAL SPRAY    1 spray (50 mcg total) by Each Nostril route once daily.    LANCETS MISC    1 each by Misc.(Non-Drug; Combo Route) route once daily.    SEMAGLUTIDE (OZEMPIC) 0.25 MG OR 0.5 MG(2 MG/1.5 ML) PEN INJECTOR    Inject 0.5 mg into the skin every 7 days.    SUMATRIPTAN (IMITREX) 25 MG TAB    Take 1 tablet (25 mg total) by mouth once as needed for Migraine.    TOPIRAMATE 25 MG CAPSULE    Take 1 capsule (25 mg total) by mouth 2 (two) times daily.    VALACYCLOVIR (VALTREX) 500 MG TABLET    Take 500 mg by mouth 2 (two) times daily.   Discontinued Medications    JEAN 30 MG TABLET             Disclaimer:  This note has been generated using voice-recognition software. There may be grammatical or spelling errors that have been missed during proof-reading

## 2023-05-23 ENCOUNTER — LAB VISIT (OUTPATIENT)
Dept: LAB | Facility: HOSPITAL | Age: 28
End: 2023-05-23
Attending: INTERNAL MEDICINE
Payer: COMMERCIAL

## 2023-05-23 ENCOUNTER — OFFICE VISIT (OUTPATIENT)
Dept: INTERNAL MEDICINE | Facility: CLINIC | Age: 28
End: 2023-05-23
Payer: COMMERCIAL

## 2023-05-23 VITALS
BODY MASS INDEX: 46.75 KG/M2 | HEART RATE: 85 BPM | OXYGEN SATURATION: 100 % | HEIGHT: 60 IN | SYSTOLIC BLOOD PRESSURE: 120 MMHG | DIASTOLIC BLOOD PRESSURE: 78 MMHG | WEIGHT: 238.13 LBS

## 2023-05-23 DIAGNOSIS — E11.69 TYPE 2 DIABETES MELLITUS WITH OTHER SPECIFIED COMPLICATION, WITHOUT LONG-TERM CURRENT USE OF INSULIN: ICD-10-CM

## 2023-05-23 DIAGNOSIS — Z23 NEED FOR VACCINATION: ICD-10-CM

## 2023-05-23 DIAGNOSIS — E11.69 TYPE 2 DIABETES MELLITUS WITH OTHER SPECIFIED COMPLICATION, WITHOUT LONG-TERM CURRENT USE OF INSULIN: Primary | ICD-10-CM

## 2023-05-23 PROCEDURE — 99999 PR PBB SHADOW E&M-EST. PATIENT-LVL IV: ICD-10-PCS | Mod: PBBFAC,,, | Performed by: INTERNAL MEDICINE

## 2023-05-23 PROCEDURE — 90677 PCV20 VACCINE IM: CPT | Mod: S$GLB,,, | Performed by: INTERNAL MEDICINE

## 2023-05-23 PROCEDURE — 3044F PR MOST RECENT HEMOGLOBIN A1C LEVEL <7.0%: ICD-10-PCS | Mod: CPTII,S$GLB,, | Performed by: INTERNAL MEDICINE

## 2023-05-23 PROCEDURE — 1159F MED LIST DOCD IN RCRD: CPT | Mod: CPTII,S$GLB,, | Performed by: INTERNAL MEDICINE

## 2023-05-23 PROCEDURE — 1159F PR MEDICATION LIST DOCUMENTED IN MEDICAL RECORD: ICD-10-PCS | Mod: CPTII,S$GLB,, | Performed by: INTERNAL MEDICINE

## 2023-05-23 PROCEDURE — 80076 HEPATIC FUNCTION PANEL: CPT | Performed by: INTERNAL MEDICINE

## 2023-05-23 PROCEDURE — 80061 LIPID PANEL: CPT | Performed by: INTERNAL MEDICINE

## 2023-05-23 PROCEDURE — 99999 PR PBB SHADOW E&M-EST. PATIENT-LVL IV: CPT | Mod: PBBFAC,,, | Performed by: INTERNAL MEDICINE

## 2023-05-23 PROCEDURE — 36415 COLL VENOUS BLD VENIPUNCTURE: CPT | Mod: PO | Performed by: INTERNAL MEDICINE

## 2023-05-23 PROCEDURE — 3074F SYST BP LT 130 MM HG: CPT | Mod: CPTII,S$GLB,, | Performed by: INTERNAL MEDICINE

## 2023-05-23 PROCEDURE — 3008F PR BODY MASS INDEX (BMI) DOCUMENTED: ICD-10-PCS | Mod: CPTII,S$GLB,, | Performed by: INTERNAL MEDICINE

## 2023-05-23 PROCEDURE — 3008F BODY MASS INDEX DOCD: CPT | Mod: CPTII,S$GLB,, | Performed by: INTERNAL MEDICINE

## 2023-05-23 PROCEDURE — 83036 HEMOGLOBIN GLYCOSYLATED A1C: CPT | Performed by: INTERNAL MEDICINE

## 2023-05-23 PROCEDURE — 3074F PR MOST RECENT SYSTOLIC BLOOD PRESSURE < 130 MM HG: ICD-10-PCS | Mod: CPTII,S$GLB,, | Performed by: INTERNAL MEDICINE

## 2023-05-23 PROCEDURE — 99214 PR OFFICE/OUTPT VISIT, EST, LEVL IV, 30-39 MIN: ICD-10-PCS | Mod: 25,S$GLB,, | Performed by: INTERNAL MEDICINE

## 2023-05-23 PROCEDURE — 80048 BASIC METABOLIC PNL TOTAL CA: CPT | Performed by: INTERNAL MEDICINE

## 2023-05-23 PROCEDURE — 3044F HG A1C LEVEL LT 7.0%: CPT | Mod: CPTII,S$GLB,, | Performed by: INTERNAL MEDICINE

## 2023-05-23 PROCEDURE — 3078F PR MOST RECENT DIASTOLIC BLOOD PRESSURE < 80 MM HG: ICD-10-PCS | Mod: CPTII,S$GLB,, | Performed by: INTERNAL MEDICINE

## 2023-05-23 PROCEDURE — 90471 PNEUMOCOCCAL CONJUGATE VACCINE 20-VALENT: ICD-10-PCS | Mod: S$GLB,,, | Performed by: INTERNAL MEDICINE

## 2023-05-23 PROCEDURE — 99214 OFFICE O/P EST MOD 30 MIN: CPT | Mod: 25,S$GLB,, | Performed by: INTERNAL MEDICINE

## 2023-05-23 PROCEDURE — 90677 PNEUMOCOCCAL CONJUGATE VACCINE 20-VALENT: ICD-10-PCS | Mod: S$GLB,,, | Performed by: INTERNAL MEDICINE

## 2023-05-23 PROCEDURE — 3078F DIAST BP <80 MM HG: CPT | Mod: CPTII,S$GLB,, | Performed by: INTERNAL MEDICINE

## 2023-05-23 PROCEDURE — 90471 IMMUNIZATION ADMIN: CPT | Mod: S$GLB,,, | Performed by: INTERNAL MEDICINE

## 2023-05-23 RX ORDER — SEMAGLUTIDE 1.34 MG/ML
1 INJECTION, SOLUTION SUBCUTANEOUS
Qty: 6 ML | Refills: 1 | Status: SHIPPED | OUTPATIENT
Start: 2023-05-23 | End: 2023-06-06

## 2023-05-23 RX ORDER — SEMAGLUTIDE 1.34 MG/ML
1 INJECTION, SOLUTION SUBCUTANEOUS
Qty: 6 ML | Refills: 1 | Status: SHIPPED | OUTPATIENT
Start: 2023-05-23 | End: 2023-05-23 | Stop reason: SDUPTHER

## 2023-05-23 NOTE — PATIENT INSTRUCTIONS
Lets get some labs today  We can increase the ozempic to 1 mg weekly  You foot check is normal   I might try the oncould or the HOKA shoes  Please see the eye doctor   Lets catch you up on the pneumonia vaccine  Let follow up in 6 months with prelabs

## 2023-05-24 LAB
ALBUMIN SERPL BCP-MCNC: 4 G/DL (ref 3.5–5.2)
ALP SERPL-CCNC: 61 U/L (ref 55–135)
ALT SERPL W/O P-5'-P-CCNC: 11 U/L (ref 10–44)
ANION GAP SERPL CALC-SCNC: 9 MMOL/L (ref 8–16)
AST SERPL-CCNC: 13 U/L (ref 10–40)
BILIRUB DIRECT SERPL-MCNC: 0.2 MG/DL (ref 0.1–0.3)
BILIRUB SERPL-MCNC: 0.3 MG/DL (ref 0.1–1)
BUN SERPL-MCNC: 10 MG/DL (ref 6–20)
CALCIUM SERPL-MCNC: 9.5 MG/DL (ref 8.7–10.5)
CHLORIDE SERPL-SCNC: 105 MMOL/L (ref 95–110)
CHOLEST SERPL-MCNC: 134 MG/DL (ref 120–199)
CHOLEST/HDLC SERPL: 3.1 {RATIO} (ref 2–5)
CO2 SERPL-SCNC: 23 MMOL/L (ref 23–29)
CREAT SERPL-MCNC: 0.8 MG/DL (ref 0.5–1.4)
EST. GFR  (NO RACE VARIABLE): >60 ML/MIN/1.73 M^2
ESTIMATED AVG GLUCOSE: 131 MG/DL (ref 68–131)
GLUCOSE SERPL-MCNC: 79 MG/DL (ref 70–110)
HBA1C MFR BLD: 6.2 % (ref 4–5.6)
HDLC SERPL-MCNC: 43 MG/DL (ref 40–75)
HDLC SERPL: 32.1 % (ref 20–50)
LDLC SERPL CALC-MCNC: 80.2 MG/DL (ref 63–159)
NONHDLC SERPL-MCNC: 91 MG/DL
POTASSIUM SERPL-SCNC: 3.7 MMOL/L (ref 3.5–5.1)
PROT SERPL-MCNC: 7.9 G/DL (ref 6–8.4)
SODIUM SERPL-SCNC: 137 MMOL/L (ref 136–145)
TRIGL SERPL-MCNC: 54 MG/DL (ref 30–150)

## 2023-06-01 ENCOUNTER — OFFICE VISIT (OUTPATIENT)
Dept: URGENT CARE | Facility: CLINIC | Age: 28
End: 2023-06-01
Payer: COMMERCIAL

## 2023-06-01 VITALS
WEIGHT: 238 LBS | SYSTOLIC BLOOD PRESSURE: 123 MMHG | OXYGEN SATURATION: 100 % | TEMPERATURE: 99 F | DIASTOLIC BLOOD PRESSURE: 79 MMHG | BODY MASS INDEX: 46.72 KG/M2 | HEART RATE: 96 BPM | RESPIRATION RATE: 16 BRPM | HEIGHT: 60 IN

## 2023-06-01 DIAGNOSIS — J06.9 VIRAL URI WITH COUGH: Primary | ICD-10-CM

## 2023-06-01 PROBLEM — Z30.46 ENCOUNTER FOR SURVEILLANCE OF IMPLANTABLE SUBDERMAL CONTRACEPTIVE: Status: ACTIVE | Noted: 2023-01-30

## 2023-06-01 LAB
CTP QC/QA: YES
SARS-COV-2 AG RESP QL IA.RAPID: NEGATIVE

## 2023-06-01 PROCEDURE — 99214 PR OFFICE/OUTPT VISIT, EST, LEVL IV, 30-39 MIN: ICD-10-PCS | Mod: S$GLB,,, | Performed by: PHYSICIAN ASSISTANT

## 2023-06-01 PROCEDURE — 87811 SARS CORONAVIRUS 2 ANTIGEN POCT, MANUAL READ: ICD-10-PCS | Mod: QW,S$GLB,, | Performed by: PHYSICIAN ASSISTANT

## 2023-06-01 PROCEDURE — 99214 OFFICE O/P EST MOD 30 MIN: CPT | Mod: S$GLB,,, | Performed by: PHYSICIAN ASSISTANT

## 2023-06-01 PROCEDURE — 87811 SARS-COV-2 COVID19 W/OPTIC: CPT | Mod: QW,S$GLB,, | Performed by: PHYSICIAN ASSISTANT

## 2023-06-01 RX ORDER — BENZONATATE 200 MG/1
200 CAPSULE ORAL 3 TIMES DAILY PRN
Qty: 30 CAPSULE | Refills: 0 | Status: SHIPPED | OUTPATIENT
Start: 2023-06-01 | End: 2023-06-11

## 2023-06-01 RX ORDER — FLUTICASONE PROPIONATE 50 MCG
1 SPRAY, SUSPENSION (ML) NASAL DAILY
Qty: 16 G | Refills: 0 | Status: SHIPPED | OUTPATIENT
Start: 2023-06-01 | End: 2023-06-08

## 2023-06-01 NOTE — PROGRESS NOTES
Subjective:      Patient ID: Karen Paredes is a 27 y.o. female.    Vitals:  height is 5' (1.524 m) and weight is 108 kg (238 lb). Her oral temperature is 98.7 °F (37.1 °C). Her blood pressure is 123/79 and her pulse is 96. Her respiration is 16 and oxygen saturation is 100%.     Chief Complaint: Sinus Problem    27 year old female presents today with sore throat, nasal drainage, sneezing. HA. No known exposure to anything. Never had COVID before that she knows of. COVID vaccinated. Symptoms started a few days ago. Treatments at home include Mucinex with no relief.     Sinus Problem  This is a new problem. The current episode started in the past 7 days. The problem has been gradually worsening since onset. There has been no fever. The pain is mild. Associated symptoms include congestion, headaches, sinus pressure, sneezing and a sore throat. Pertinent negatives include no chills, coughing, diaphoresis, ear pain, hoarse voice, neck pain, shortness of breath or swollen glands. Past treatments include nothing.     Constitution: Positive for fever. Negative for chills, sweating and fatigue.   HENT:  Positive for congestion, postnasal drip, sinus pressure and sore throat. Negative for ear pain, ear discharge, tinnitus, hearing loss, mouth sores, tongue pain, tongue lesion, sinus pain, trouble swallowing and voice change.    Neck: Negative for neck pain and neck stiffness.   Cardiovascular:  Negative for chest pain and sob on exertion.   Eyes:  Negative for eye discharge and eye itching.   Respiratory:  Negative for cough and shortness of breath.    Gastrointestinal:  Negative for abdominal pain, nausea, vomiting, constipation and diarrhea.   Musculoskeletal:  Positive for muscle ache. Negative for muscle cramps.   Skin:  Negative for color change, pale and rash.   Allergic/Immunologic: Positive for sneezing.   Neurological:  Positive for headaches. Negative for dizziness and altered mental status.    Psychiatric/Behavioral:  Negative for altered mental status.    Past Medical History:   Diagnosis Date    Diabetes mellitus, type 2     Morbid obesity with BMI of 40.0-44.9, adult     Obesity        Past Surgical History:   Procedure Laterality Date    NO PAST SURGERIES      TONSILLECTOMY Bilateral 10/1/2020    Procedure: TONSILLECTOMY;  Surgeon: Meenakshi Cannon MD;  Location: [facility] OR;  Service: ENT;  Laterality: Bilateral;  RN PREOP 9/25/2020   T/S---COVID -NEGATIVE ON 9/30  UPT       Family History   Problem Relation Age of Onset    No Known Problems Mother     No Known Problems Father     Breast cancer Neg Hx     Colon cancer Neg Hx     Ovarian cancer Neg Hx     Cervical cancer Neg Hx     Uterine cancer Neg Hx        Social History     Socioeconomic History    Marital status: Single   Tobacco Use    Smoking status: Never    Smokeless tobacco: Current   Substance and Sexual Activity    Alcohol use: No    Drug use: No    Sexual activity: Yes     Partners: Male     Comment: last encounter, 2 months, lmp currentl no regular    Social History Narrative        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 30 each 6    blood-glucose meter Misc as per administration instructions      etonogestrel (NEXPLANON SDRM) by Subdermal route.      lancets Misc 1 each by Misc.(Non-Drug; Combo Route) route once daily. 100 each 1    semaglutide (OZEMPIC) 0.25 mg or 0.5 mg (2 mg/3 mL) pen injector Inject 0.5 mg into the skin every 7 days. 9 mL 0    semaglutide (OZEMPIC) 1 mg/dose (4 mg/3 mL) Inject 1 mg into the skin every 7 days. 6 mL 1    topiramate 25 mg capsule Take 1 capsule (25 mg total) by mouth 2 (two) times  daily. 60 capsule 11    benzonatate (TESSALON) 200 MG capsule Take 1 capsule (200 mg total) by mouth 3 (three) times daily as needed for Cough. 30 capsule 0    fluticasone propionate (FLONASE) 50 mcg/actuation nasal spray 1 spray (50 mcg total) by Each Nostril route once daily. for 7 days 16 g 0    sumatriptan (IMITREX) 25 MG Tab Take 1 tablet (25 mg total) by mouth once as needed for Migraine. 30 tablet 0    valACYclovir (VALTREX) 500 MG tablet Take 500 mg by mouth 2 (two) times daily.       No current facility-administered medications for this visit.     Facility-Administered Medications Ordered in Other Visits   Medication Dose Route Frequency Provider Last Rate Last Admin    lidocaine (PF) 10 mg/ml (1%) injection 10 mg  1 mL Intradermal Once Meenakshi Cannon MD        sodium chloride 0.9% flush 10 mL  10 mL Intravenous PRN Meenakshi Cannon MD           Review of patient's allergies indicates:  No Known Allergies     Objective:     Physical Exam   Constitutional: She is oriented to person, place, and time. She appears well-developed. She is cooperative.  Non-toxic appearance. She does not appear ill. No distress.   HENT:   Head: Normocephalic and atraumatic.   Ears:   Right Ear: Hearing, external ear and ear canal normal. Tympanic membrane is not injected, not erythematous, not retracted and not bulging. A middle ear effusion is present. impacted cerumen  Left Ear: Hearing, external ear and ear canal normal. Tympanic membrane is not injected, not erythematous, not retracted and not bulging. A middle ear effusion is present. impacted cerumen  Nose: Rhinorrhea and congestion present. No mucosal edema or nasal deformity. No epistaxis. Right sinus exhibits no maxillary sinus tenderness and no frontal sinus tenderness. Left sinus exhibits no maxillary sinus tenderness and no frontal sinus tenderness.   Mouth/Throat: Uvula is midline and mucous membranes are normal. Mucous membranes are moist. No trismus in the  jaw. Normal dentition. No uvula swelling. Posterior oropharyngeal erythema present. No oropharyngeal exudate or posterior oropharyngeal edema.   Eyes: Conjunctivae and lids are normal. Right eye exhibits no discharge. Left eye exhibits no discharge. No scleral icterus.   Neck: Trachea normal and phonation normal. Neck supple. No edema present. No erythema present. No neck rigidity present.   Cardiovascular: Normal rate, regular rhythm, normal heart sounds and normal pulses.   No murmur heard.Exam reveals no gallop.   Pulmonary/Chest: Effort normal and breath sounds normal. No stridor. No respiratory distress. She has no decreased breath sounds. She has no wheezes. She has no rhonchi. She has no rales.   Abdominal: Normal appearance.   Musculoskeletal:      Cervical back: She exhibits no tenderness.   Lymphadenopathy:     She has no cervical adenopathy.   Neurological: She is alert and oriented to person, place, and time. She displays no weakness. She exhibits normal muscle tone. Coordination normal.   Skin: Skin is warm, dry, intact, not diaphoretic, not pale and no rash.   Psychiatric: Her speech is normal and behavior is normal. Mood, judgment and thought content normal.   Nursing note and vitals reviewed.    Results for orders placed or performed in visit on 06/01/23   SARS Coronavirus 2 Antigen, POCT Manual Read   Result Value Ref Range    SARS Coronavirus 2 Antigen Negative Negative     Acceptable Yes        Assessment:     1. Viral URI with cough        Plan:       Viral URI with cough  -     SARS Coronavirus 2 Antigen, POCT Manual Read  -     fluticasone propionate (FLONASE) 50 mcg/actuation nasal spray; 1 spray (50 mcg total) by Each Nostril route once daily. for 7 days  Dispense: 16 g; Refill: 0  -     benzonatate (TESSALON) 200 MG capsule; Take 1 capsule (200 mg total) by mouth 3 (three) times daily as needed for Cough.  Dispense: 30 capsule; Refill: 0      Patient Instructions                "                                             URI   If your condition worsens or fails to improve we recommend that you receive another evaluation at the urgent care/ER immediately or contact your PCP to discuss your concerns. You must understand that you've received an urgent care treatment only and that you may be released before all your medical problems are known or treated. You the patient will arrange for follouwp care as instructed.   If we discussed that I think your illness is viral, it will not respond to antibiotics and will last 10-14 days.     Tessalon as needed for cough.   Flonase (fluticasone) is a nasal spray which is available over the counter and may help with your symptoms.   Zyrtec D, Claritin D or Allegra D can also help with symptoms of congestion and drainage.   If you have hypertension avoid using the "D" which is the decongestant   If you just have drainage you can take plain zyrtec, claritin or allegra   If you just have a congested feeling you can take pseudoephedrine (unless you have high blood pressure) which you have to sign for behind the counter.   Rest and fluids are also important.     Salt water gargles, warm tea with honey and chloraseptic spray as needed for sore throat.   Tylenol or ibuprofen can also be used as directed for pain unless you have an allergy to them or medical condition such as stomach ulcers, kidney or liver disease or blood thinners etc for which you should not be taking these type of medications.                       "

## 2023-06-01 NOTE — PATIENT INSTRUCTIONS
"                                                         URI   If your condition worsens or fails to improve we recommend that you receive another evaluation at the urgent care/ER immediately or contact your PCP to discuss your concerns. You must understand that you've received an urgent care treatment only and that you may be released before all your medical problems are known or treated. You the patient will arrange for follouwp care as instructed.   If we discussed that I think your illness is viral, it will not respond to antibiotics and will last 10-14 days.     Tessalon as needed for cough.   Flonase (fluticasone) is a nasal spray which is available over the counter and may help with your symptoms.   Zyrtec D, Claritin D or Allegra D can also help with symptoms of congestion and drainage.   If you have hypertension avoid using the "D" which is the decongestant   If you just have drainage you can take plain zyrtec, claritin or allegra   If you just have a congested feeling you can take pseudoephedrine (unless you have high blood pressure) which you have to sign for behind the counter.   Rest and fluids are also important.     Salt water gargles, warm tea with honey and chloraseptic spray as needed for sore throat.   Tylenol or ibuprofen can also be used as directed for pain unless you have an allergy to them or medical condition such as stomach ulcers, kidney or liver disease or blood thinners etc for which you should not be taking these type of medications.         "

## 2023-06-06 DIAGNOSIS — E11.69 TYPE 2 DIABETES MELLITUS WITH OTHER SPECIFIED COMPLICATION, WITHOUT LONG-TERM CURRENT USE OF INSULIN: ICD-10-CM

## 2023-06-06 RX ORDER — SEMAGLUTIDE 1.34 MG/ML
1 INJECTION, SOLUTION SUBCUTANEOUS
Qty: 6 ML | Refills: 1 | Status: SHIPPED | OUTPATIENT
Start: 2023-06-06 | End: 2024-06-05

## 2023-06-13 ENCOUNTER — PATIENT MESSAGE (OUTPATIENT)
Dept: RESEARCH | Facility: HOSPITAL | Age: 28
End: 2023-06-13
Payer: COMMERCIAL

## 2023-07-05 ENCOUNTER — PATIENT MESSAGE (OUTPATIENT)
Dept: RESEARCH | Facility: HOSPITAL | Age: 28
End: 2023-07-05
Payer: COMMERCIAL

## 2023-07-19 ENCOUNTER — PATIENT MESSAGE (OUTPATIENT)
Dept: RESEARCH | Facility: HOSPITAL | Age: 28
End: 2023-07-19
Payer: COMMERCIAL

## 2023-07-26 ENCOUNTER — OFFICE VISIT (OUTPATIENT)
Dept: URGENT CARE | Facility: CLINIC | Age: 28
End: 2023-07-26
Payer: COMMERCIAL

## 2023-07-26 VITALS
OXYGEN SATURATION: 97 % | SYSTOLIC BLOOD PRESSURE: 136 MMHG | TEMPERATURE: 99 F | HEIGHT: 60 IN | BODY MASS INDEX: 46.72 KG/M2 | DIASTOLIC BLOOD PRESSURE: 94 MMHG | WEIGHT: 238 LBS | RESPIRATION RATE: 20 BRPM | HEART RATE: 107 BPM

## 2023-07-26 DIAGNOSIS — U07.1 COVID-19 VIRUS DETECTED: Primary | ICD-10-CM

## 2023-07-26 DIAGNOSIS — R11.0 NAUSEA: ICD-10-CM

## 2023-07-26 DIAGNOSIS — U07.1 COVID-19 VIRUS DETECTED: ICD-10-CM

## 2023-07-26 LAB
CTP QC/QA: YES
SARS-COV-2 AG RESP QL IA.RAPID: POSITIVE

## 2023-07-26 PROCEDURE — 99213 PR OFFICE/OUTPT VISIT, EST, LEVL III, 20-29 MIN: ICD-10-PCS | Mod: S$GLB,,, | Performed by: NURSE PRACTITIONER

## 2023-07-26 PROCEDURE — 87811 SARS CORONAVIRUS 2 ANTIGEN POCT, MANUAL READ: ICD-10-PCS | Mod: QW,S$GLB,, | Performed by: NURSE PRACTITIONER

## 2023-07-26 PROCEDURE — 99213 OFFICE O/P EST LOW 20 MIN: CPT | Mod: S$GLB,,, | Performed by: NURSE PRACTITIONER

## 2023-07-26 PROCEDURE — 87811 SARS-COV-2 COVID19 W/OPTIC: CPT | Mod: QW,S$GLB,, | Performed by: NURSE PRACTITIONER

## 2023-07-26 RX ORDER — NIRMATRELVIR AND RITONAVIR 300-100 MG
KIT ORAL
Qty: 30 TABLET | Refills: 0 | Status: SHIPPED | OUTPATIENT
Start: 2023-07-26 | End: 2023-07-31

## 2023-07-26 RX ORDER — ONDANSETRON 4 MG/1
4 TABLET, ORALLY DISINTEGRATING ORAL EVERY 6 HOURS PRN
Qty: 20 TABLET | Refills: 0 | Status: SHIPPED | OUTPATIENT
Start: 2023-07-26

## 2023-07-26 RX ORDER — ONDANSETRON 8 MG/1
8 TABLET, ORALLY DISINTEGRATING ORAL
Status: COMPLETED | OUTPATIENT
Start: 2023-07-26 | End: 2023-07-26

## 2023-07-26 RX ADMIN — ONDANSETRON 8 MG: 8 TABLET, ORALLY DISINTEGRATING ORAL at 03:07

## 2023-07-26 NOTE — PATIENT INSTRUCTIONS
**Calculating Isolation:   Day 0 is your first day of symptoms or a positive viral test. Day 1 is the first full day after your symptoms developed or your test specimen was collected. If you have COVID-19 or have symptoms, isolate for at least 5 days.    Instructions for Patients with Confirmed or Suspected COVID-19    Stay home for 5 days and isolate from others in your home.    Wear a well-fitting mask if you must be around others in your home.    Do not travel.    End isolation after 5 full days if you are fever-free for 24 hours (without the use of fever-reducing medication) and your symptoms are improving.    End isolation after at least 5 full days after your positive test.    If you got very sick from COVID-19 or have a weakened immune system  You should isolate for at least 10 days. Consult your doctor before ending isolation.    Take precautions until day 10    Wear a well-fitting mask for 10 full days any time you are around others inside your home or in public. Do not go to places where you are unable to wear a mask.    Do not travel until a full 10 days after your symptoms started or the date your positive test was taken if you had no symptoms.      Avoid being around people who are more likely to get very sick from COVID-19.    Separate yourself from other people and animals in your home.  Call ahead before visiting your doctor.  Wear a facemask.  Cover your coughs and sneezes.  Wash your hands often with soap and water; hand  can be used, too.  Avoid sharing personal household items.  Wipe down surfaces used daily.  Monitor your symptoms. Seek prompt medical attention if your illness is worsening (e.g., difficulty breathing).   Before seeking care, call your healthcare provider.  If you have a medical emergency and need to call 911, notify the dispatch personnel that you have, or are being evaluated for COVID-19. If possible, put on a facemask before emergency medical services  arrive.        Recommended precautions for household members, intimate partners, and caregivers in a home setting of a patient with symptomatic laboratory-confirmed COVID-19 or a patient under investigation.  Household members, intimate partners, and caregivers in the home setting awaiting tests results have close contact with a person with symptomatic, laboratory-confirmed COVID-19 or a person under investigation. Close contacts should monitor their health; they should call their provider right away if they develop symptoms suggestive of COVID-19 (e.g., fever, cough, shortness of breath).    Close contacts should also follow these recommendations:  Make sure that you understand and can help the patient follow their provider's instructions for medication(s) and care. You should help the patient with basic needs in the home and provide support for getting groceries, prescriptions, and other personal needs.  Monitor the patient's symptoms. If the patient is getting sicker, call his or her healthcare provider and tell them that the patient has laboratory-confirmed COVID-19. If the patient has a medical emergency and you need to call 911, notify the dispatch personnel that the patient has, or is being evaluated for COVID-19.  Household members should stay in another room or be  from the patient. Household members should use a separate bedroom and bathroom, if available.  Prohibit visitors.  Household members should care for any pets in the home.  Make sure that shared spaces in the home have good air flow, such as by an air conditioner or an opened window, weather permitting.  Perform hand hygiene frequently. Wash your hands often with soap and water for at least 20 seconds or use an alcohol-based hand  (that contains > 60% alcohol) covering all surfaces of your hands and rubbing them together until they feel dry. Soap and water should be used preferentially.  Avoid touching your eyes, nose, and  mouth.  The patient should wear a facemask. If the patient is not able to wear a facemask (for example, because it causes trouble breathing), caregivers should wear a mask when they are in the same room as the patient.  Wear a disposable facemask and gloves when you touch or have contact with the patient's blood, stool, or body fluids, such as saliva, sputum, nasal mucus, vomit, urine.  Throw out disposable facemasks and gloves after using them. Do not reuse.  When removing personal protective equipment, first remove and dispose of gloves. Then, immediately clean your hands with soap and water or alcohol-based hand . Next, remove and dispose of facemask, and immediately clean your hands again with soap and water or alcohol-based hand .  You should not share dishes, drinking glasses, cups, eating utensils, towels, bedding, or other items with the patient. After the patient uses these items, you should wash them thoroughly (see below Wash laundry thoroughly).  Clean all high-touch surfaces, such as counters, tabletops, doorknobs, bathroom fixtures, toilets, phones, keyboards, tablets, and bedside tables, every day. Also, clean any surfaces that may have blood, stool, or body fluids on them.  Use a household cleaning spray or wipe, according to the label instructions. Labels contain instructions for safe and effective use of the cleaning product including precautions you should take when applying the product, such as wearing gloves and making sure you have good ventilation during use of the product.  Wash laundry thoroughly.  Immediately remove and wash clothes or bedding that have blood, stool, or body fluids on them.  Wear disposable gloves while handling soiled items and keep soiled items away from your body. Clean your hands (with soap and water or an alcohol-based hand ) immediately after removing your gloves.  Read and follow directions on labels of laundry or clothing items and  detergent. In general, using a normal laundry detergent according to washing machine instructions and dry thoroughly using the warmest temperatures recommended on the clothing label.  Place all used disposable gloves, facemasks, and other contaminated items in a lined container before disposing of them with other household waste. Clean your hands (with soap and water or an alcohol-based hand ) immediately after handling these items. Soap and water should be used preferentially if hands are visibly dirty.  Discuss any additional questions with your state or local health department or healthcare provider. Check available hours when contacting your local health department.    For more information see CDC link below.      https://www.cdc.gov/coronavirus/2019-ncov/hcp/guidance-prevent-spread.html#precautions        Sources:  [facility], [facility]      Below are suggestions for symptomatic relief:   -Tylenol every 4 hours OR ibuprofen every 6 hours as needed for pain/fever.   -Tylenol Cold & Flu, Mucinex for cough and congestion   -Salt water gargles to soothe throat pain.   -Chloroseptic spray also helps to numb throat pain.   -Nasal saline spray reduces inflammation and dryness.   -Warm face compresses to help with facial sinus pain/pressure.   -Vicks vapor rub at night.   -Flonase OTC or Nasacort OTC for nasal congestion.   -Ariza's Honey Lemon Drops (for sore throat) or Mucinex INSTA SOOTHE cough drops (for cough and sore throat)    -Simple foods like chicken noodle soup.   -Delsym helps with coughing at night   -Zyrtec or Claritin during the day & Benadryl at night may help with allergies.     If you DO NOT have Hypertension or any history of palpitations, it is ok to take over the counter Sudafed or Mucinex D or Allegra-D or Claritin-D or Zyrtec-D.  If you do take one of the above, it is ok to combine that with plain over the counter Mucinex or Allegra or Claritin or Zyrtec. If,  for example, you are taking Zyrtec -D, you can combine that with Mucinex, but not Mucinex-D.  If you are taking Mucinex-D, you can combine that with plain Allegra or Claritin or Zyrtec.   If you DO have Hypertension or palpitations, it is safe to take Coricidin HBP for relief of sinus symptoms.    If you were prescribed a narcotic or controlled medication, do not drive or operate heavy equipment or machinery while taking these medications.  You must understand that you've received an Urgent Care treatment only and that you may be released before all your medical problems are known or treated. You, the patient, will arrange for follow up care as instructed.  Follow up with your PCP or specialty clinic as directed within 2-5 days if not improved or as needed.  You can call [phone] to schedule an appointment with the appropriate provider.  If your condition worsens we recommend that you receive another evaluation at the emergency room immediately or contact your primary medical clinics after hours call service to discuss your concerns.  Please return here or go to the Emergency Department for any concerns or worsening of condition.

## 2023-07-26 NOTE — LETTER
[address] ? [place], [address] ? Phone [phone] ? Fax [phone]           Return to Work/School    Patient: Karen Paredes  YOB: 1995   Date: 07/26/2023      To Whom It May Concern:     Karen Paredes was in contact with/seen in my office on 07/26/2023. COVID-19 is present in our communities across the [facility]. Not all patients are eligible or appropriate to be tested. In this situation, your employee meets the following criteria:     Karen Paredes has met the criteria for COVID-19 testing and has a POSITIVE result. She can return to work once they are asymptomatic for 24 hours without the use of fever reducing medications AND at least five days from the start of symptoms (or from the first positive result if they have no symptoms).      If you have any questions or concerns, or if I can be of further assistance, please do not hesitate to contact me.     Sincerely,    Vivian Farley NP

## 2023-07-26 NOTE — PROGRESS NOTES
Subjective:      Patient ID: Karen Paredes is a 27 y.o. female.    Vitals:  height is 5' (1.524 m) and weight is 108 kg (238 lb). Her temperature is 98.6 °F (37 °C). Her blood pressure is 136/94 (abnormal) and her pulse is 107. Her respiration is 20 and oxygen saturation is 97%.     Chief Complaint: Sinus Problem    27-year-old female presents to Urgent Care with complaint of generalized body aches, sinus headache, sneezing, nausea, and vomiting since Saturday.  Patient denies any chest pain, shortness of breath, abdominal pain or fever at this time.    Sinus Problem  This is a new problem. The current episode started in the past 7 days. The problem is unchanged. There has been no fever. Associated symptoms include congestion, headaches, sinus pressure, sneezing and a sore throat. Pertinent negatives include no chills, coughing, diaphoresis, ear pain, hoarse voice, neck pain, shortness of breath or swollen glands. Past treatments include nothing.     Constitution: Positive for fatigue. Negative for chills and sweating.   HENT:  Positive for congestion, postnasal drip, sinus pain, sinus pressure and sore throat. Negative for ear pain.    Neck: Negative for neck pain.   Cardiovascular:  Negative for chest pain and sob on exertion.   Respiratory:  Negative for cough and shortness of breath.    Gastrointestinal:  Positive for nausea and vomiting.   Musculoskeletal:  Positive for muscle ache.   Allergic/Immunologic: Positive for sneezing.   Neurological:  Positive for headaches. Negative for altered mental status.   Psychiatric/Behavioral:  Negative for altered mental status.     Objective:     Physical Exam   Constitutional: She is oriented to person, place, and time. She appears well-developed. She is cooperative.  Non-toxic appearance. She does not appear ill. No distress.   HENT:   Head: Normocephalic and atraumatic.   Nose: Nose normal. No mucosal edema, rhinorrhea or nasal deformity. No epistaxis. Right sinus  exhibits no maxillary sinus tenderness and no frontal sinus tenderness. Left sinus exhibits no maxillary sinus tenderness and no frontal sinus tenderness.   Mouth/Throat: Uvula is midline, oropharynx is clear and moist and mucous membranes are normal. No trismus in the jaw. Normal dentition. No uvula swelling. No oropharyngeal exudate, posterior oropharyngeal edema or posterior oropharyngeal erythema. Tonsils are 2+ on the right. Tonsils are 2+ on the left. No tonsillar exudate.   Eyes: EOM and lids are normal. Pupils are equal, round, and reactive to light. No scleral icterus.   Neck: Neck supple.   Cardiovascular: Normal rate, regular rhythm and normal heart sounds.   Pulmonary/Chest: Effort normal and breath sounds normal. No respiratory distress.   Abdominal: Bowel sounds are normal. Soft. There is no abdominal tenderness. There is no rebound, no guarding, no tenderness at McBurney's point and negative Jackson's sign.   Musculoskeletal: Normal range of motion.         General: No deformity. Normal range of motion.   Neurological: She is alert and oriented to person, place, and time. She exhibits normal muscle tone. Coordination and gait normal.   Skin: Skin is warm, dry, intact, not diaphoretic and not pale. Capillary refill takes less than 2 seconds.   Psychiatric: Her speech is normal and behavior is normal. Judgment and thought content normal.   Nursing note and vitals reviewed.    Assessment:     1. COVID-19 virus detected    2. Nausea      Results for orders placed or performed in visit on 07/26/23   SARS Coronavirus 2 Antigen, POCT Manual Read   Result Value Ref Range    SARS Coronavirus 2 Antigen Positive (A) Negative     Acceptable Yes        Plan:   Patient tested positive for COVID-19 and due to risk factors for progression to severe disease, Paxlovid (nirmatrelvir-ritonavir) was rx to reduce the risk of hospitalization. Paxlovid rx sent to pharmacy if patient decides to take medication.  Strict ER precautions discussed. Advised to f/u with PCP for further evaluation. Patient verbalized understanding and agreed with tx plan. Patient noted to be stable upon discharge.     COVID-19 virus detected  -     nirmatrelvir-ritonavir (PAXLOVID) 300 mg (150 mg x 2)-100 mg copackaged tablets (EUA); Take 3 tablets by mouth 2 (two) times daily. Each dose contains 2 nirmatrelvir (pink tablets) and 1 ritonavir (white tablet). Take all 3 tablets together  Dispense: 30 tablet; Refill: 0  -     ondansetron (ZOFRAN-ODT) 4 MG TbDL; Take 1 tablet (4 mg total) by mouth every 6 (six) hours as needed (nausea).  Dispense: 20 tablet; Refill: 0    Nausea  -     SARS Coronavirus 2 Antigen, POCT Manual Read  -     ondansetron disintegrating tablet 8 mg      Patient Instructions   **Calculating Isolation:   Day 0 is your first day of symptoms or a positive viral test. Day 1 is the first full day after your symptoms developed or your test specimen was collected. If you have COVID-19 or have symptoms, isolate for at least 5 days.    Instructions for Patients with Confirmed or Suspected COVID-19    Stay home for 5 days and isolate from others in your home.    Wear a well-fitting mask if you must be around others in your home.    Do not travel.    End isolation after 5 full days if you are fever-free for 24 hours (without the use of fever-reducing medication) and your symptoms are improving.    End isolation after at least 5 full days after your positive test.    If you got very sick from COVID-19 or have a weakened immune system  You should isolate for at least 10 days. Consult your doctor before ending isolation.    Take precautions until day 10    Wear a well-fitting mask for 10 full days any time you are around others inside your home or in public. Do not go to places where you are unable to wear a mask.    Do not travel until a full 10 days after your symptoms started or the date your positive test was taken if you had no  symptoms.      Avoid being around people who are more likely to get very sick from COVID-19.    Separate yourself from other people and animals in your home.  Call ahead before visiting your doctor.  Wear a facemask.  Cover your coughs and sneezes.  Wash your hands often with soap and water; hand  can be used, too.  Avoid sharing personal household items.  Wipe down surfaces used daily.  Monitor your symptoms. Seek prompt medical attention if your illness is worsening (e.g., difficulty breathing).   Before seeking care, call your healthcare provider.  If you have a medical emergency and need to call 911, notify the dispatch personnel that you have, or are being evaluated for COVID-19. If possible, put on a facemask before emergency medical services arrive.        Recommended precautions for household members, intimate partners, and caregivers in a home setting of a patient with symptomatic laboratory-confirmed COVID-19 or a patient under investigation.  Household members, intimate partners, and caregivers in the home setting awaiting tests results have close contact with a person with symptomatic, laboratory-confirmed COVID-19 or a person under investigation. Close contacts should monitor their health; they should call their provider right away if they develop symptoms suggestive of COVID-19 (e.g., fever, cough, shortness of breath).    Close contacts should also follow these recommendations:  Make sure that you understand and can help the patient follow their provider's instructions for medication(s) and care. You should help the patient with basic needs in the home and provide support for getting groceries, prescriptions, and other personal needs.  Monitor the patient's symptoms. If the patient is getting sicker, call his or her healthcare provider and tell them that the patient has laboratory-confirmed COVID-19. If the patient has a medical emergency and you need to call 911, notify the dispatch  personnel that the patient has, or is being evaluated for COVID-19.  Household members should stay in another room or be  from the patient. Household members should use a separate bedroom and bathroom, if available.  Prohibit visitors.  Household members should care for any pets in the home.  Make sure that shared spaces in the home have good air flow, such as by an air conditioner or an opened window, weather permitting.  Perform hand hygiene frequently. Wash your hands often with soap and water for at least 20 seconds or use an alcohol-based hand  (that contains > 60% alcohol) covering all surfaces of your hands and rubbing them together until they feel dry. Soap and water should be used preferentially.  Avoid touching your eyes, nose, and mouth.  The patient should wear a facemask. If the patient is not able to wear a facemask (for example, because it causes trouble breathing), caregivers should wear a mask when they are in the same room as the patient.  Wear a disposable facemask and gloves when you touch or have contact with the patient's blood, stool, or body fluids, such as saliva, sputum, nasal mucus, vomit, urine.  Throw out disposable facemasks and gloves after using them. Do not reuse.  When removing personal protective equipment, first remove and dispose of gloves. Then, immediately clean your hands with soap and water or alcohol-based hand . Next, remove and dispose of facemask, and immediately clean your hands again with soap and water or alcohol-based hand .  You should not share dishes, drinking glasses, cups, eating utensils, towels, bedding, or other items with the patient. After the patient uses these items, you should wash them thoroughly (see below Wash laundry thoroughly).  Clean all high-touch surfaces, such as counters, tabletops, doorknobs, bathroom fixtures, toilets, phones, keyboards, tablets, and bedside tables, every day. Also, clean any surfaces  that may have blood, stool, or body fluids on them.  Use a household cleaning spray or wipe, according to the label instructions. Labels contain instructions for safe and effective use of the cleaning product including precautions you should take when applying the product, such as wearing gloves and making sure you have good ventilation during use of the product.  Wash laundry thoroughly.  Immediately remove and wash clothes or bedding that have blood, stool, or body fluids on them.  Wear disposable gloves while handling soiled items and keep soiled items away from your body. Clean your hands (with soap and water or an alcohol-based hand ) immediately after removing your gloves.  Read and follow directions on labels of laundry or clothing items and detergent. In general, using a normal laundry detergent according to washing machine instructions and dry thoroughly using the warmest temperatures recommended on the clothing label.  Place all used disposable gloves, facemasks, and other contaminated items in a lined container before disposing of them with other household waste. Clean your hands (with soap and water or an alcohol-based hand ) immediately after handling these items. Soap and water should be used preferentially if hands are visibly dirty.  Discuss any additional questions with your state or local health department or healthcare provider. Check available hours when contacting your local health department.    For more information see CDC link below.      https://www.cdc.gov/coronavirus/2019-ncov/hcp/guidance-prevent-spread.html#precautions        Sources:  [facility]      Below are suggestions for symptomatic relief:   -Tylenol every 4 hours OR ibuprofen every 6 hours as needed for pain/fever.   -Tylenol Cold & Flu, Mucinex for cough and congestion   -Salt water gargles to soothe throat pain.   -Chloroseptic spray also helps to numb throat  pain.   -Nasal saline spray reduces inflammation and dryness.   -Warm face compresses to help with facial sinus pain/pressure.   -Vicks vapor rub at night.   -Flonase OTC or Nasacort OTC for nasal congestion.   -Ariza's Honey Lemon Drops (for sore throat) or Mucinex INSTA SOOTHE cough drops (for cough and sore throat)    -Simple foods like chicken noodle soup.   -Delsym helps with coughing at night   -Zyrtec or Claritin during the day & Benadryl at night may help with allergies.     If you DO NOT have Hypertension or any history of palpitations, it is ok to take over the counter Sudafed or Mucinex D or Allegra-D or Claritin-D or Zyrtec-D.  If you do take one of the above, it is ok to combine that with plain over the counter Mucinex or Allegra or Claritin or Zyrtec. If, for example, you are taking Zyrtec -D, you can combine that with Mucinex, but not Mucinex-D.  If you are taking Mucinex-D, you can combine that with plain Allegra or Claritin or Zyrtec.   If you DO have Hypertension or palpitations, it is safe to take Coricidin HBP for relief of sinus symptoms.    If you were prescribed a narcotic or controlled medication, do not drive or operate heavy equipment or machinery while taking these medications.  You must understand that you've received an Urgent Care treatment only and that you may be released before all your medical problems are known or treated. You, the patient, will arrange for follow up care as instructed.  Follow up with your PCP or specialty clinic as directed within 2-5 days if not improved or as needed.  You can call [phone] to schedule an appointment with the appropriate provider.  If your condition worsens we recommend that you receive another evaluation at the emergency room immediately or contact your primary medical clinics after hours call service to discuss your concerns.  Please return here or go to the Emergency Department for any concerns or worsening of condition.

## 2023-09-22 ENCOUNTER — OFFICE VISIT (OUTPATIENT)
Dept: URGENT CARE | Facility: CLINIC | Age: 28
End: 2023-09-22
Payer: COMMERCIAL

## 2023-09-22 VITALS
OXYGEN SATURATION: 98 % | BODY MASS INDEX: 46.72 KG/M2 | HEIGHT: 60 IN | WEIGHT: 238 LBS | TEMPERATURE: 98 F | DIASTOLIC BLOOD PRESSURE: 86 MMHG | SYSTOLIC BLOOD PRESSURE: 131 MMHG | RESPIRATION RATE: 17 BRPM | HEART RATE: 77 BPM

## 2023-09-22 DIAGNOSIS — J06.9 VIRAL UPPER RESPIRATORY TRACT INFECTION: Primary | ICD-10-CM

## 2023-09-22 DIAGNOSIS — R09.81 NASAL CONGESTION: ICD-10-CM

## 2023-09-22 DIAGNOSIS — R05.9 COUGH, UNSPECIFIED TYPE: ICD-10-CM

## 2023-09-22 LAB
CTP QC/QA: YES
SARS-COV-2 AG RESP QL IA.RAPID: NEGATIVE

## 2023-09-22 PROCEDURE — 99213 PR OFFICE/OUTPT VISIT, EST, LEVL III, 20-29 MIN: ICD-10-PCS | Mod: S$GLB,,, | Performed by: PHYSICIAN ASSISTANT

## 2023-09-22 PROCEDURE — 99213 OFFICE O/P EST LOW 20 MIN: CPT | Mod: S$GLB,,, | Performed by: PHYSICIAN ASSISTANT

## 2023-09-22 PROCEDURE — 87811 SARS-COV-2 COVID19 W/OPTIC: CPT | Mod: QW,S$GLB,, | Performed by: PHYSICIAN ASSISTANT

## 2023-09-22 PROCEDURE — 87811 SARS CORONAVIRUS 2 ANTIGEN POCT, MANUAL READ: ICD-10-PCS | Mod: QW,S$GLB,, | Performed by: PHYSICIAN ASSISTANT

## 2023-09-22 RX ORDER — BROMPHENIRAMINE MALEATE, PSEUDOEPHEDRINE HYDROCHLORIDE, AND DEXTROMETHORPHAN HYDROBROMIDE 2; 30; 10 MG/5ML; MG/5ML; MG/5ML
10 SYRUP ORAL EVERY 4 HOURS PRN
Qty: 240 ML | Refills: 0 | Status: SHIPPED | OUTPATIENT
Start: 2023-09-22 | End: 2023-10-02

## 2023-09-22 RX ORDER — FLUTICASONE PROPIONATE 50 MCG
1 SPRAY, SUSPENSION (ML) NASAL DAILY PRN
Qty: 15.8 ML | Refills: 0 | Status: SHIPPED | OUTPATIENT
Start: 2023-09-22 | End: 2023-10-22

## 2023-09-22 NOTE — PATIENT INSTRUCTIONS
Recommend oral antihistamine (loratadine/zyrtec) +/- oral decongestant (pseudoephedrine) for rhinorrhea, steroid nasal spray (flonase), prescription/OTC cough medicine (brompheniramine-pseudoeph-DM (BROMFED DM) ) as needed during day before 7 pm,  Nyquil at night,Tylenol (Acetaminophen) and/or Motrin (Ibuprofen) as directed for control of pain and/or fever.   Bromphed contains pseudoephedrine so please do not take a separate oral decongestant (sudafed/pseudoephedrine) or stimulant (adderall/vyvanse) for ADHD.       Please drink plenty of fluids.  Please get plenty of rest.  Nasal irrigation with a saline spray or Netti Pot like device per their directions is also recommended.  If you  smoke, please stop smoking.    To help ease a sore throat, you can:  Use a sore throat spray.  Suck on hard candy or throat lozenges.  Gargle with warm saltwater a few times each day. Mix of 1/4 teaspoon (1.25 grams) salt in 8 ounces (240 mL) of warm water.  Use a cool mist humidifier to help you breathe easier.    If you negative (-) for a COVID test today and you are continuing to have symptoms, it is recommended to repeat the test in 48 hours x 3. If you continue to be negative, you may return to school/work once you have improved symptoms and no fever for 24 hours without any medications. This applies to all viral illnesses.           Discussed prescriptions and over-the-counter medicines to help with patient's symptoms:  A steroid nose spray (flonase) can help with a stuffy nose. It can also help with drainage down the back of your throat.  An antihistamine (loratadine,zyrtec,allegra, xyzal) can help with itching, sneezing, or runny nose.  An antihistamine eye drop can help with itchy eyes.  A decongestant (pseudoephedrine,  Phenylephrine) can help with a stuffy nose. Take <10 days for congestion and rhinorrhea. Once symptoms improve, proceed with loratadine/zyrtec once a day. These ingredients can keep you up all night, decrease  appetite, feel jittery, and raise blood pressure with long term use.  OTC Coricidin can be used for patients with hypertension and palpitations because you cannot use ingredients such as pseudoephedrine and phenylephrine for oral decongestants.  Coricidin HBP Cough & Cold (Chlorpheniramine/Dextromethorphan)  Coricidin HBP Maximum Strength Multi-Symptom Flu  (Acetaminophen,Dextromethorphan, Chlorpheniramine)  Coricidin HBP® Maximum Strength Cold & Flu Day/Night (Acetaminophen,Dextromethorphan, Doxylamine, Guaifenesin)  Coricidin HBP Chest Congestion & Cough (Dextromethorphan + Guaifenesin)    Medications that control cough are suppressants and expectorants. Suppressants are tessalon pearls and dextromethorphan. If you have a productive cough with sputum, you need an expectorant called guaifenesin. Dextromethorphan and Guaifenesin are active ingredients in many OTC cough/cold medications such as Dayquil/Nyquil, Mucinex, and Robitussin Mucus+Chest Congestion.            Common Cold Medicine Ingredients Cheat sheet  Acetaminophen (APAP) -pain reliever/fever reducer  Dextromethorphan - cough suppressant  Guaifenesin - expectorant/thins and loosens mucus  Phenylephrine - nasal decongestant  Diphenhydramine or Doxylamine succinate - antihistamine, helps you fall asleep  Promethazine or Brompheniramine - Prescription strength antihistamines    These OTC cold medications are safe to use if you do not have high blood pressure (hypertension) or palpitations.  DayQuil and NyQuil - Cough, Cold & Flu Relief LiquiCaps  DayQuil: Acetaminophen, Dextromethorphan, and Phenylephrine   NyQuil: Acetaminophen, Dextromethorphan, and Doxylamine  DayQuil and NyQuil SEVERE Maximum Strength Cough, Cold & Flu Relief LiquiCaps  DAYQUIL: Acetaminophen, Dextromethorphan, Guaifenesin, and Phenylephrine  NYQUIL: Acetaminophen, Dextromethorphan, Doxylamine, and Phenylephrine   Mucinex DM: Guaifenesin,Dextromethorphan  Mucinex Maximum Strength  Sinus-Max® Pressure, Pain & Cough Liquid Gels: Acetaminophen/Dextromethorphan/ Guaifenesin/Phenylephrine     If not allergic, please take over the counter Tylenol (Acetaminophen) and/or Motrin (Ibuprofen) as directed for control of pain and/or fever.        Please remember that you have received care at an urgent care today. Urgent cares are not emergency rooms and are not equipped to handle life threatening emergencies and cannot rule in or out certain medical conditions and you may be released before all of your medical problems are known or treated.     Please arrange follow up with your primary care physician or speciality clinic within 2-5 days if your signs and symptoms have not resolved or worsen.     Patient can call our Referral Hotline at [phone] to make an appointment.      Please return here or go to the Emergency Department for any concerns or worsening of condition.  Signs of infection. These include a fever of 100.4°F (38°C) or higher, chills, cough, more sputum or change in color of sputum.  You are having so much trouble breathing that you can only say one or two words at a time.  You need to sit upright at all times to be able to breathe and or cannot lie down.  You have trouble breathing when talking or sitting still.  You have a fever of 100.4°F (38°C) or higher or chills.  You have chest pain when you cough, have trouble breathing but can still talk in full sentences, or cough up blood.

## 2023-09-22 NOTE — PROGRESS NOTES
Subjective:      Patient ID: Karen Paredes is a 28 y.o. female.    Vitals:  height is 5' (1.524 m) and weight is 108 kg (238 lb). Her temperature is 98.3 °F (36.8 °C). Her blood pressure is 131/86 and her pulse is 77. Her respiration is 17 and oxygen saturation is 98%.     Chief Complaint: CLEMENTINEI    Karen Paredes is a 28 y.o. female who complains of cold symptoms (runny nose, nasal congestion, mild cough, and headache) for 3 days. Patient has a Past Medical History: Diabetes mellitus, type 2. She said the sore throat started yesterday.    Patient denies fever, chills, nausea, vomiting, loss of taste, or loss of smell.        URI   This is a new problem. The current episode started in the past 7 days. There has been no fever. Associated symptoms include congestion, coughing, headaches, rhinorrhea and a sore throat. Pertinent negatives include no abdominal pain, chest pain, diarrhea, dysuria, ear pain, joint pain, joint swelling, nausea, neck pain, plugged ear sensation, rash, sinus pain, sneezing, swollen glands, vomiting or wheezing. She has tried sleep for the symptoms.       HENT:  Positive for congestion and sore throat. Negative for ear pain and sinus pain.    Neck: Negative for neck pain.   Cardiovascular:  Negative for chest pain.   Respiratory:  Positive for cough. Negative for wheezing.    Gastrointestinal:  Negative for abdominal pain, nausea, vomiting and diarrhea.   Genitourinary:  Negative for dysuria.   Skin:  Negative for rash.   Allergic/Immunologic: Negative for sneezing.   Neurological:  Positive for headaches.      Objective:     Physical Exam   Constitutional: She is oriented to person, place, and time. She appears well-developed. She is cooperative.  Non-toxic appearance. She does not appear ill. No distress.   HENT:   Head: Normocephalic and atraumatic.   Ears:   Right Ear: Hearing, tympanic membrane, external ear and ear canal normal.   Left Ear: Hearing, tympanic membrane, external ear and ear  canal normal.   Nose: Rhinorrhea and congestion present. No mucosal edema or nasal deformity. No epistaxis. Right sinus exhibits no maxillary sinus tenderness and no frontal sinus tenderness. Left sinus exhibits no maxillary sinus tenderness and no frontal sinus tenderness.   Mouth/Throat: Uvula is midline, oropharynx is clear and moist and mucous membranes are normal. Mucous membranes are moist. No trismus in the jaw. Normal dentition. No uvula swelling. No oropharyngeal exudate, posterior oropharyngeal edema or posterior oropharyngeal erythema. Oropharynx is clear.      Comments:  postnasal discharge noted on the posterior pharyngeal wall    Eyes: Conjunctivae and lids are normal. Pupils are equal, round, and reactive to light. No scleral icterus. Extraocular movement intact   Neck: Trachea normal and phonation normal. Neck supple. No edema present. No erythema present. No neck rigidity present.   Cardiovascular: Normal rate, regular rhythm, normal heart sounds and normal pulses.   Pulmonary/Chest: Effort normal and breath sounds normal. No respiratory distress. She has no decreased breath sounds. She has no rhonchi.   Abdominal: Normal appearance.   Musculoskeletal: Normal range of motion.         General: No deformity. Normal range of motion.      Cervical back: She exhibits no tenderness.   Lymphadenopathy:     She has no cervical adenopathy.   Neurological: She is alert and oriented to person, place, and time. She exhibits normal muscle tone. Coordination normal.   Skin: Skin is warm, dry, intact, not diaphoretic and not pale.   Psychiatric: Her speech is normal and behavior is normal. Judgment and thought content normal.   Nursing note and vitals reviewed.    Results for orders placed or performed in visit on 09/22/23   SARS Coronavirus 2 Antigen, POCT Manual Read   Result Value Ref Range    SARS Coronavirus 2 Antigen Negative Negative     Acceptable Yes        Assessment:     1. Viral upper  respiratory tract infection    2. Cough, unspecified type    3. Nasal congestion      Patient presents with clinical exam findings and history consistent with above.      On exam, patient is nontoxic appearing and vitals are stable.      Diagnostic testing results were reviewed and discussed with patient/guardian.   Tests ordered in clinic:  SARS Coronavirus 2 Antigen, POCT : negative    Previous progress notes/admissions/labs and medications were reviewed.    Plan:   Recommend oral antihistamine (loratadine/zyrtec) +/- oral decongestant (pseudoephedrine) for rhinorrhea, steroid nasal spray (flonase), prescription/OTC cough medicine (brompheniramine-pseudoeph-DM (BROMFED DM) ) as needed during day before 7 pm,  Nyquil at night,Tylenol (Acetaminophen) and/or Motrin (Ibuprofen) as directed for control of pain and/or fever.   Bromphed contains pseudoephedrine so please do not take a separate oral decongestant (sudafed/pseudoephedrine) or stimulant (adderall/vyvanse) for ADHD.     Viral upper respiratory tract infection  -     fluticasone propionate (FLONASE) 50 mcg/actuation nasal spray; 1 spray (50 mcg total) by Each Nostril route daily as needed for Rhinitis or Allergies.  Dispense: 15.8 mL; Refill: 0  -     brompheniramine-pseudoeph-DM (BROMFED DM) 2-30-10 mg/5 mL Syrp; Take 10 mLs by mouth every 4 (four) hours as needed (cough, cold, and congestion).  Dispense: 240 mL; Refill: 0    Cough, unspecified type  -     SARS Coronavirus 2 Antigen, POCT Manual Read    Nasal congestion  -     fluticasone propionate (FLONASE) 50 mcg/actuation nasal spray; 1 spray (50 mcg total) by Each Nostril route daily as needed for Rhinitis or Allergies.  Dispense: 15.8 mL; Refill: 0  -     brompheniramine-pseudoeph-DM (BROMFED DM) 2-30-10 mg/5 mL Syrp; Take 10 mLs by mouth every 4 (four) hours as needed (cough, cold, and congestion).  Dispense: 240 mL; Refill: 0                  1) See orders for this visit as documented in the  electronic medical record.  2) Symptomatic therapy suggested: use acetaminophen prn.   3) Call or return to clinic prn if these symptoms worsen or fail to improve as anticipated.    Discussed results/diagnosis/plan with patient in clinic.  We had shared decision making for patient's treatment. Patient verbalized understanding and in agreement with current treatment plan.     Patient was instructed to return for re-evaluation with urgent care or PCP for continued outpatient workup and management if symptoms do not improve/worsening symptoms. Strict ED versus clinic precautions given in depth.    Discharge and follow-up instructions given verbally/printed with the patient who expressed understanding. The instructions and results are also available on [employer].              Kasey Downing PA-C          Patient Instructions   Recommend oral antihistamine (loratadine/zyrtec) +/- oral decongestant (pseudoephedrine) for rhinorrhea, steroid nasal spray (flonase), prescription/OTC cough medicine (brompheniramine-pseudoeph-DM (BROMFED DM) ) as needed during day before 7 pm,  Nyquil at night,Tylenol (Acetaminophen) and/or Motrin (Ibuprofen) as directed for control of pain and/or fever.   Bromphed contains pseudoephedrine so please do not take a separate oral decongestant (sudafed/pseudoephedrine) or stimulant (adderall/vyvanse) for ADHD.       Please drink plenty of fluids.  Please get plenty of rest.  Nasal irrigation with a saline spray or Netti Pot like device per their directions is also recommended.  If you  smoke, please stop smoking.    To help ease a sore throat, you can:  Use a sore throat spray.  Suck on hard candy or throat lozenges.  Gargle with warm saltwater a few times each day. Mix of 1/4 teaspoon (1.25 grams) salt in 8 ounces (240 mL) of warm water.  Use a cool mist humidifier to help you breathe easier.    If you negative (-) for a COVID test today and you are continuing to have symptoms, it is recommended to  repeat the test in 48 hours x 3. If you continue to be negative, you may return to school/work once you have improved symptoms and no fever for 24 hours without any medications. This applies to all viral illnesses.           Discussed prescriptions and over-the-counter medicines to help with patient's symptoms:  A steroid nose spray (flonase) can help with a stuffy nose. It can also help with drainage down the back of your throat.  An antihistamine (loratadine,zyrtec,allegra, xyzal) can help with itching, sneezing, or runny nose.  An antihistamine eye drop can help with itchy eyes.  A decongestant (pseudoephedrine,  Phenylephrine) can help with a stuffy nose. Take <10 days for congestion and rhinorrhea. Once symptoms improve, proceed with loratadine/zyrtec once a day. These ingredients can keep you up all night, decrease appetite, feel jittery, and raise blood pressure with long term use.  OTC Coricidin can be used for patients with hypertension and palpitations because you cannot use ingredients such as pseudoephedrine and phenylephrine for oral decongestants.  Coricidin HBP Cough & Cold (Chlorpheniramine/Dextromethorphan)  Coricidin HBP Maximum Strength Multi-Symptom Flu  (Acetaminophen,Dextromethorphan, Chlorpheniramine)  Coricidin HBP® Maximum Strength Cold & Flu Day/Night (Acetaminophen,Dextromethorphan, Doxylamine, Guaifenesin)  Coricidin HBP Chest Congestion & Cough (Dextromethorphan + Guaifenesin)    Medications that control cough are suppressants and expectorants. Suppressants are tessalon pearls and dextromethorphan. If you have a productive cough with sputum, you need an expectorant called guaifenesin. Dextromethorphan and Guaifenesin are active ingredients in many OTC cough/cold medications such as Dayquil/Nyquil, Mucinex, and Robitussin Mucus+Chest Congestion.            Common Cold Medicine Ingredients Cheat sheet  Acetaminophen (APAP) -pain reliever/fever reducer  Dextromethorphan - cough  suppressant  Guaifenesin - expectorant/thins and loosens mucus  Phenylephrine - nasal decongestant  Diphenhydramine or Doxylamine succinate - antihistamine, helps you fall asleep  Promethazine or Brompheniramine - Prescription strength antihistamines    These OTC cold medications are safe to use if you do not have high blood pressure (hypertension) or palpitations.  DayQuil and NyQuil - Cough, Cold & Flu Relief LiquiCaps  DayQuil: Acetaminophen, Dextromethorphan, and Phenylephrine   NyQuil: Acetaminophen, Dextromethorphan, and Doxylamine  DayQuil and NyQuil SEVERE Maximum Strength Cough, Cold & Flu Relief LiquiCaps  DAYQUIL: Acetaminophen, Dextromethorphan, Guaifenesin, and Phenylephrine  NYQUIL: Acetaminophen, Dextromethorphan, Doxylamine, and Phenylephrine   Mucinex DM: Guaifenesin,Dextromethorphan  Mucinex Maximum Strength Sinus-Max® Pressure, Pain & Cough Liquid Gels: Acetaminophen/Dextromethorphan/ Guaifenesin/Phenylephrine     If not allergic, please take over the counter Tylenol (Acetaminophen) and/or Motrin (Ibuprofen) as directed for control of pain and/or fever.        Please remember that you have received care at an urgent care today. Urgent cares are not emergency rooms and are not equipped to handle life threatening emergencies and cannot rule in or out certain medical conditions and you may be released before all of your medical problems are known or treated.     Please arrange follow up with your primary care physician or speciality clinic within 2-5 days if your signs and symptoms have not resolved or worsen.     Patient can call our Referral Hotline at [phone] to make an appointment.      Please return here or go to the Emergency Department for any concerns or worsening of condition.  Signs of infection. These include a fever of 100.4°F (38°C) or higher, chills, cough, more sputum or change in color of sputum.  You are having so much trouble breathing that you can only say one or two words at  a time.  You need to sit upright at all times to be able to breathe and or cannot lie down.  You have trouble breathing when talking or sitting still.  You have a fever of 100.4°F (38°C) or higher or chills.  You have chest pain when you cough, have trouble breathing but can still talk in full sentences, or cough up blood.

## 2023-11-01 ENCOUNTER — PATIENT MESSAGE (OUTPATIENT)
Dept: ADMINISTRATIVE | Facility: HOSPITAL | Age: 28
End: 2023-11-01
Payer: COMMERCIAL

## 2023-11-03 ENCOUNTER — PATIENT MESSAGE (OUTPATIENT)
Dept: ADMINISTRATIVE | Facility: HOSPITAL | Age: 28
End: 2023-11-03
Payer: COMMERCIAL

## 2023-12-13 ENCOUNTER — LAB VISIT (OUTPATIENT)
Dept: LAB | Facility: HOSPITAL | Age: 28
End: 2023-12-13
Attending: INTERNAL MEDICINE
Payer: COMMERCIAL

## 2023-12-13 DIAGNOSIS — E11.69 TYPE 2 DIABETES MELLITUS WITH OTHER SPECIFIED COMPLICATION, WITHOUT LONG-TERM CURRENT USE OF INSULIN: ICD-10-CM

## 2023-12-13 DIAGNOSIS — E11.9 TYPE 2 DIABETES MELLITUS WITHOUT COMPLICATION: ICD-10-CM

## 2023-12-13 LAB
ALBUMIN/CREAT UR: 3.7 UG/MG (ref 0–30)
ANION GAP SERPL CALC-SCNC: 10 MMOL/L (ref 8–16)
BUN SERPL-MCNC: 5 MG/DL (ref 6–20)
CALCIUM SERPL-MCNC: 9.3 MG/DL (ref 8.7–10.5)
CHLORIDE SERPL-SCNC: 105 MMOL/L (ref 95–110)
CO2 SERPL-SCNC: 23 MMOL/L (ref 23–29)
CREAT SERPL-MCNC: 0.8 MG/DL (ref 0.5–1.4)
CREAT UR-MCNC: 296 MG/DL (ref 15–325)
EST. GFR  (NO RACE VARIABLE): >60 ML/MIN/1.73 M^2
ESTIMATED AVG GLUCOSE: 120 MG/DL (ref 68–131)
GLUCOSE SERPL-MCNC: 87 MG/DL (ref 70–110)
HBA1C MFR BLD: 5.8 % (ref 4–5.6)
MICROALBUMIN UR DL<=1MG/L-MCNC: 11 UG/ML
POTASSIUM SERPL-SCNC: 3.9 MMOL/L (ref 3.5–5.1)
SODIUM SERPL-SCNC: 138 MMOL/L (ref 136–145)

## 2023-12-13 PROCEDURE — 82043 UR ALBUMIN QUANTITATIVE: CPT | Performed by: INTERNAL MEDICINE

## 2023-12-13 PROCEDURE — 80048 BASIC METABOLIC PNL TOTAL CA: CPT | Performed by: INTERNAL MEDICINE

## 2023-12-13 PROCEDURE — 83036 HEMOGLOBIN GLYCOSYLATED A1C: CPT | Performed by: INTERNAL MEDICINE

## 2023-12-13 PROCEDURE — 36415 COLL VENOUS BLD VENIPUNCTURE: CPT | Mod: PO | Performed by: INTERNAL MEDICINE

## 2023-12-15 ENCOUNTER — OFFICE VISIT (OUTPATIENT)
Dept: INTERNAL MEDICINE | Facility: CLINIC | Age: 28
End: 2023-12-15
Payer: COMMERCIAL

## 2023-12-15 VITALS — WEIGHT: 227 LBS | BODY MASS INDEX: 44.33 KG/M2

## 2023-12-15 DIAGNOSIS — E11.69 TYPE 2 DIABETES MELLITUS WITH OTHER SPECIFIED COMPLICATION, WITHOUT LONG-TERM CURRENT USE OF INSULIN: Primary | ICD-10-CM

## 2023-12-15 PROCEDURE — 3008F PR BODY MASS INDEX (BMI) DOCUMENTED: ICD-10-PCS | Mod: CPTII,95,, | Performed by: INTERNAL MEDICINE

## 2023-12-15 PROCEDURE — 99214 OFFICE O/P EST MOD 30 MIN: CPT | Mod: 95,,, | Performed by: INTERNAL MEDICINE

## 2023-12-15 PROCEDURE — 3066F PR DOCUMENTATION OF TREATMENT FOR NEPHROPATHY: ICD-10-PCS | Mod: CPTII,95,, | Performed by: INTERNAL MEDICINE

## 2023-12-15 PROCEDURE — 99214 PR OFFICE/OUTPT VISIT, EST, LEVL IV, 30-39 MIN: ICD-10-PCS | Mod: 95,,, | Performed by: INTERNAL MEDICINE

## 2023-12-15 PROCEDURE — 3008F BODY MASS INDEX DOCD: CPT | Mod: CPTII,95,, | Performed by: INTERNAL MEDICINE

## 2023-12-15 PROCEDURE — 3061F PR NEG MICROALBUMINURIA RESULT DOCUMENTED/REVIEW: ICD-10-PCS | Mod: CPTII,95,, | Performed by: INTERNAL MEDICINE

## 2023-12-15 PROCEDURE — 1160F RVW MEDS BY RX/DR IN RCRD: CPT | Mod: CPTII,95,, | Performed by: INTERNAL MEDICINE

## 2023-12-15 PROCEDURE — 1160F PR REVIEW ALL MEDS BY PRESCRIBER/CLIN PHARMACIST DOCUMENTED: ICD-10-PCS | Mod: CPTII,95,, | Performed by: INTERNAL MEDICINE

## 2023-12-15 PROCEDURE — 1159F MED LIST DOCD IN RCRD: CPT | Mod: CPTII,95,, | Performed by: INTERNAL MEDICINE

## 2023-12-15 PROCEDURE — 1159F PR MEDICATION LIST DOCUMENTED IN MEDICAL RECORD: ICD-10-PCS | Mod: CPTII,95,, | Performed by: INTERNAL MEDICINE

## 2023-12-15 PROCEDURE — 3061F NEG MICROALBUMINURIA REV: CPT | Mod: CPTII,95,, | Performed by: INTERNAL MEDICINE

## 2023-12-15 PROCEDURE — 3044F HG A1C LEVEL LT 7.0%: CPT | Mod: CPTII,95,, | Performed by: INTERNAL MEDICINE

## 2023-12-15 PROCEDURE — 3066F NEPHROPATHY DOC TX: CPT | Mod: CPTII,95,, | Performed by: INTERNAL MEDICINE

## 2023-12-15 PROCEDURE — 3044F PR MOST RECENT HEMOGLOBIN A1C LEVEL <7.0%: ICD-10-PCS | Mod: CPTII,95,, | Performed by: INTERNAL MEDICINE

## 2023-12-15 NOTE — PROGRESS NOTES
The patient location is: [place]   The chief complaint leading to consultation is: dm    Visit type: audiovisual    Face to Face time with patient: 15   minutes of total time spent on the encounter, which includes face to face time and non-face to face time preparing to see the patient (eg, review of tests), Obtaining and/or reviewing separately obtained history, Documenting clinical information in the electronic or other health record, Independently interpreting results (not separately reported) and communicating results to the patient/family/caregiver, or Care coordination (not separately reported).         Each patient to whom he or she provides medical services by telemedicine is:  (1) informed of the relationship between the physician and patient and the respective role of any other health care provider with respect to management of the patient; and (2) notified that he or she may decline to receive medical services by telemedicine and may withdraw from such care at any time.    Notes:   Answers submitted by the patient for this visit:  Diabetes Questionnaire (Submitted on 12/15/2023)  Chief Complaint: Diabetes problem  Diabetes type: type 2  MedicAlert ID: No  Disease duration: 2 Years  blurred vision: No  chest pain: No  fatigue: No  foot paresthesias: No  foot ulcerations: No  polydipsia: Yes  polyphagia: No  polyuria: No  visual change: No  weakness: No  weight loss: No  Symptom course: stable  confusion: No  dizziness: No  headaches: Yes  hunger: No  mood changes: No  nervous/anxious: No  pallor: No  seizures: No  sleepiness: No  speech difficulty: No  sweats: No  tremors: No  blackouts: No  hospitalization: No  nocturnal hypoglycemia: No  required assistance: No  required glucagon: No  CVA: No  heart disease: No  nephropathy: No  peripheral neuropathy: No  PVD: No  retinopathy: No  autonomic neuropathy: No  CAD risks: obesity  Current treatments: oral agent (monotherapy)  Treatment compliance: most  of the time  Home blood tests: 1-2 x per week  Monitoring compliance: no compliance  Weight trend: fluctuating minimally  Meal planning: avoidance of concentrated sweets  Exercise: intermittently  Dietitian visit: Yes  Eye exam current: No  Sees podiatrist: No      Assessment:         1. Type 2 diabetes mellitus with other specified complication, without long-term current use of insulin          Plan:           Diagnoses and all orders for this visit:    Type 2 diabetes mellitus with other specified complication, without long-term current use of insulin  Chronic  Controlled  Patient is at goal today   I have reviewed lifestyle modification to achieve/maintain goals  We will continue the current medication regimen as listed below  Patient will follow up in 3 months   -     Ambulatory referral/consult to Ophthalmology; Future  -     Basic Metabolic Panel; Standing  -     Hemoglobin A1C; Standing  -     Hepatic Function Panel; Standing  -     Lipid Panel; Standing        Work on SSB  3 month with prelabs and OV DM        Subjective:       Patient ID: Karen Paredes is a 28 y.o. female.    Chief Complaint: No chief complaint on file.          Interim Hx  Went to     Concerns today  Fu     Chronic problems     Weight  Breakfast: some days of the weeks, cereal, egg, toast,    Lunch: sometimes missed, sandwiches   Dinner: baked wings, potatis,    Desserts/Snacks:   Fast Food:  occasional, 2x/week . Seafood, firecrab , wings   Sugar sweetened Beverages: frequent 2x/week with coke, apple juice, craneberry, brisk tea   Physical activity:  walking, 3x/weeks      Diabetes  She has type 2 diabetes mellitus. No MedicAlert identification noted. The initial diagnosis of diabetes was made 2 years ago. Hypoglycemia symptoms include headaches. Pertinent negatives for hypoglycemia include no confusion, dizziness, hunger, mood changes, nervousness/anxiousness, pallor, seizures, sleepiness, speech difficulty, sweats or tremors.  Associated symptoms include polydipsia. Pertinent negatives for diabetes include no blurred vision, no chest pain, no fatigue, no foot paresthesias, no foot ulcerations, no polyphagia, no polyuria, no visual change, no weakness and no weight loss. Pertinent negatives for hypoglycemia complications include no blackouts, no hospitalization, no nocturnal hypoglycemia, no required assistance and no required glucagon injection. Symptoms are stable. Pertinent negatives for diabetic complications include no autonomic neuropathy, CVA, heart disease, nephropathy, peripheral neuropathy, PVD or retinopathy. Risk factors for coronary artery disease include obesity. Current diabetic treatment includes oral agent (monotherapy) (GLP1). She is compliant with treatment most of the time. Her weight is fluctuating minimally. She has had a previous visit with a dietitian. She participates in exercise intermittently. She monitors blood glucose at home 1-2 x per week. There is no compliance with monitoring of blood glucose. She does not see a podiatrist.Eye exam is not current.       Review of Systems   Constitutional:  Negative for fatigue and weight loss.   Eyes:  Negative for blurred vision.   Cardiovascular:  Negative for chest pain.   Endocrine: Positive for polydipsia. Negative for polyphagia and polyuria.   Integumentary:  Negative for pallor.   Neurological:  Positive for headaches. Negative for dizziness, tremors, seizures, speech difficulty and weakness.   Psychiatric/Behavioral:  Negative for confusion. The patient is not nervous/anxious.    All other systems reviewed and are negative.            Health Maintenance Due   Topic Date Due    Foot Exam  Never done    Eye Exam   - needs to set that up    10/07/2022    Influenza Vaccine (1)   Never done    COVID-19 Vaccine (3 - 2023-24 season) 09/01/2023         Objective:     Wt 103 kg (227 lb)   BMI 44.33 kg/m²         12/15/2023     3:56 PM 9/22/2023     8:45 AM 7/26/2023      2:36 PM 6/1/2023     5:33 PM 5/23/2023     1:56 PM   Vitals   Height  5' (1.524 m) 5' (1.524 m) 5' (1.524 m) 5' (1.524 m)   Weight (lbs) 227 238 238 238 238.1   BMI (kg/m2)  46.5 46.5 46.5 46.5            Physical Exam  Nursing note reviewed.   Constitutional:       General: She is not in acute distress.     Appearance: Normal appearance. She is not ill-appearing, toxic-appearing or diaphoretic.   HENT:      Head: Normocephalic.   Eyes:      Conjunctiva/sclera: Conjunctivae normal.   Pulmonary:      Effort: Pulmonary effort is normal. No respiratory distress.   Neurological:      General: No focal deficit present.      Mental Status: She is alert and oriented to person, place, and time.   Psychiatric:         Mood and Affect: Mood normal.         Behavior: Behavior normal.         Thought Content: Thought content normal.         Judgment: Judgment normal.             Future Appointments   Date Time Provider Department [place]   4/23/2024  8:15 AM LAB, CIELO KENH [facility]   4/26/2024  9:40 AM Kaleb Mchugh III, MD [facility]         Medication List with Changes/Refills   Current Medications    BLOOD SUGAR DIAGNOSTIC STRP    1 each by Misc.(Non-Drug; Combo Route) route once daily.    BLOOD-GLUCOSE METER MISC    as per administration instructions    ETONOGESTREL (NEXPLANON SDRM)    by Subdermal route.    LANCETS MISC    1 each by Misc.(Non-Drug; Combo Route) route once daily.    ONDANSETRON (ZOFRAN-ODT) 4 MG TBDL    Take 1 tablet (4 mg total) by mouth every 6 (six) hours as needed (nausea).    SEMAGLUTIDE (OZEMPIC) 1 MG/DOSE (4 MG/3 ML)    Inject 1 mg into the skin every 7 days.    SUMATRIPTAN (IMITREX) 25 MG TAB    Take 1 tablet (25 mg total) by mouth once as needed for Migraine.    TOPIRAMATE 25 MG CAPSULE    Take 1 capsule (25 mg total) by mouth 2 (two) times daily.    VALACYCLOVIR (VALTREX) 500 MG TABLET    Take 500 mg by mouth 2 (two) times daily.         Disclaimer:  This note has been generated  using voice-recognition software. There may be grammatical or spelling errors that have been missed during proof-reading

## 2023-12-15 NOTE — PATIENT INSTRUCTIONS
We were happy to see you today    For your Testing  Please have your labs and/or imaging test done  3 month         For your Medication   No rachel   For more information about side effects please visit medlineplus.gov        For your Referrals  None       For your Vaccinations  We gave your the following vaccines    Please obtain the following vaccines at the pharmacy  Flu         Please return to clinic in    Follow up for 3 months Office Visit with prelabs.        Extra resources

## 2024-01-23 ENCOUNTER — TELEPHONE (OUTPATIENT)
Dept: OPTOMETRY | Facility: CLINIC | Age: 29
End: 2024-01-23
Payer: COMMERCIAL

## 2024-04-17 ENCOUNTER — PATIENT OUTREACH (OUTPATIENT)
Dept: ADMINISTRATIVE | Facility: HOSPITAL | Age: 29
End: 2024-04-17
Payer: COMMERCIAL

## 2024-04-17 NOTE — PROGRESS NOTES
Population Health Chart Review & Patient Outreach Details      Additional [facility] Health Notes:    Telephone outreach to schedule eye exam. Pt will call back to schedule.           Updates Requested / Reviewed:      Updated Care Coordination Note, Care Everywhere, and Immunizations Reconciliation Completed or Queried: [place]         Health Maintenance Topics Overdue:      [facility] Score: 2     Eye Exam  Foot Exam                       Health Maintenance Topic(s) Outreach Outcomes & Actions Taken:    Eye Exam - Outreach Outcomes & Actions Taken  : Pt Declined Scheduling Eye Exam and will call back to schedule

## 2024-04-25 ENCOUNTER — LAB VISIT (OUTPATIENT)
Dept: LAB | Facility: HOSPITAL | Age: 29
End: 2024-04-25
Attending: INTERNAL MEDICINE
Payer: COMMERCIAL

## 2024-04-25 DIAGNOSIS — E11.69 TYPE 2 DIABETES MELLITUS WITH OTHER SPECIFIED COMPLICATION, WITHOUT LONG-TERM CURRENT USE OF INSULIN: ICD-10-CM

## 2024-04-25 LAB
ALBUMIN SERPL BCP-MCNC: 3.7 G/DL (ref 3.5–5.2)
ALP SERPL-CCNC: 61 U/L (ref 55–135)
ALT SERPL W/O P-5'-P-CCNC: 10 U/L (ref 10–44)
ANION GAP SERPL CALC-SCNC: 8 MMOL/L (ref 8–16)
AST SERPL-CCNC: 16 U/L (ref 10–40)
BILIRUB DIRECT SERPL-MCNC: 0.1 MG/DL (ref 0.1–0.3)
BILIRUB SERPL-MCNC: 0.3 MG/DL (ref 0.1–1)
BUN SERPL-MCNC: 8 MG/DL (ref 6–20)
CALCIUM SERPL-MCNC: 9.3 MG/DL (ref 8.7–10.5)
CHLORIDE SERPL-SCNC: 105 MMOL/L (ref 95–110)
CHOLEST SERPL-MCNC: 124 MG/DL (ref 120–199)
CHOLEST/HDLC SERPL: 3.2 {RATIO} (ref 2–5)
CO2 SERPL-SCNC: 24 MMOL/L (ref 23–29)
CREAT SERPL-MCNC: 0.9 MG/DL (ref 0.5–1.4)
EST. GFR  (NO RACE VARIABLE): >60 ML/MIN/1.73 M^2
ESTIMATED AVG GLUCOSE: 126 MG/DL (ref 68–131)
ESTIMATED AVG GLUCOSE: 126 MG/DL (ref 68–131)
GLUCOSE SERPL-MCNC: 121 MG/DL (ref 70–110)
HBA1C MFR BLD: 6 % (ref 4–5.6)
HBA1C MFR BLD: 6 % (ref 4–5.6)
HDLC SERPL-MCNC: 39 MG/DL (ref 40–75)
HDLC SERPL: 31.5 % (ref 20–50)
LDLC SERPL CALC-MCNC: 70 MG/DL (ref 63–159)
NONHDLC SERPL-MCNC: 85 MG/DL
POTASSIUM SERPL-SCNC: 4.2 MMOL/L (ref 3.5–5.1)
PROT SERPL-MCNC: 7.5 G/DL (ref 6–8.4)
SODIUM SERPL-SCNC: 137 MMOL/L (ref 136–145)
TRIGL SERPL-MCNC: 75 MG/DL (ref 30–150)

## 2024-04-25 PROCEDURE — 80061 LIPID PANEL: CPT | Performed by: INTERNAL MEDICINE

## 2024-04-25 PROCEDURE — 80076 HEPATIC FUNCTION PANEL: CPT | Performed by: INTERNAL MEDICINE

## 2024-04-25 PROCEDURE — 80048 BASIC METABOLIC PNL TOTAL CA: CPT | Performed by: INTERNAL MEDICINE

## 2024-04-25 PROCEDURE — 36415 COLL VENOUS BLD VENIPUNCTURE: CPT | Mod: PO | Performed by: INTERNAL MEDICINE

## 2024-04-25 PROCEDURE — 83036 HEMOGLOBIN GLYCOSYLATED A1C: CPT | Performed by: INTERNAL MEDICINE

## 2024-05-21 ENCOUNTER — HOSPITAL ENCOUNTER (EMERGENCY)
Facility: HOSPITAL | Age: 29
Discharge: HOME | End: 2024-05-22
Attending: STUDENT IN AN ORGANIZED HEALTH CARE EDUCATION/TRAINING PROGRAM
Payer: COMMERCIAL

## 2024-05-21 DIAGNOSIS — N12 PYELONEPHRITIS: Primary | ICD-10-CM

## 2024-05-21 LAB
ALBUMIN SERPL BCP-MCNC: 3.9 G/DL (ref 3.5–5.2)
ALP SERPL-CCNC: 66 U/L (ref 55–135)
ALT SERPL W/O P-5'-P-CCNC: 14 U/L (ref 10–44)
ANION GAP SERPL CALC-SCNC: 10 MMOL/L (ref 8–16)
AST SERPL-CCNC: 13 U/L (ref 10–40)
B-HCG UR QL: NEGATIVE
BACTERIA #/AREA URNS HPF: ABNORMAL /HPF
BASOPHILS # BLD AUTO: 0.03 K/UL (ref 0–0.2)
BASOPHILS NFR BLD: 0.3 % (ref 0–1.9)
BILIRUB SERPL-MCNC: 0.3 MG/DL (ref 0.1–1)
BILIRUB UR QL STRIP: NEGATIVE
BUN SERPL-MCNC: 9 MG/DL (ref 6–20)
CALCIUM SERPL-MCNC: 9 MG/DL (ref 8.7–10.5)
CHLORIDE SERPL-SCNC: 105 MMOL/L (ref 95–110)
CLARITY UR: ABNORMAL
CO2 SERPL-SCNC: 22 MMOL/L (ref 23–29)
COLOR UR: YELLOW
CREAT SERPL-MCNC: 0.8 MG/DL (ref 0.5–1.4)
CTP QC/QA: YES
DIFFERENTIAL METHOD BLD: ABNORMAL
EOSINOPHIL # BLD AUTO: 0.3 K/UL (ref 0–0.5)
EOSINOPHIL NFR BLD: 2.7 % (ref 0–8)
ERYTHROCYTE [DISTWIDTH] IN BLOOD BY AUTOMATED COUNT: 14.3 % (ref 11.5–14.5)
EST. GFR  (NO RACE VARIABLE): >60 ML/MIN/1.73 M^2
GLUCOSE SERPL-MCNC: 133 MG/DL (ref 70–110)
GLUCOSE UR QL STRIP: NEGATIVE
HCT VFR BLD AUTO: 36.1 % (ref 37–48.5)
HGB BLD-MCNC: 11.9 G/DL (ref 12–16)
HGB UR QL STRIP: ABNORMAL
HYALINE CASTS #/AREA URNS LPF: 0 /LPF
IMM GRANULOCYTES # BLD AUTO: 0.01 K/UL (ref 0–0.04)
IMM GRANULOCYTES NFR BLD AUTO: 0.1 % (ref 0–0.5)
KETONES UR QL STRIP: NEGATIVE
LEUKOCYTE ESTERASE UR QL STRIP: ABNORMAL
LYMPHOCYTES # BLD AUTO: 2.4 K/UL (ref 1–4.8)
LYMPHOCYTES NFR BLD: 24.4 % (ref 18–48)
MCH RBC QN AUTO: 26.4 PG (ref 27–31)
MCHC RBC AUTO-ENTMCNC: 33 G/DL (ref 32–36)
MCV RBC AUTO: 80 FL (ref 82–98)
MICROSCOPIC COMMENT: ABNORMAL
MONOCYTES # BLD AUTO: 0.9 K/UL (ref 0.3–1)
MONOCYTES NFR BLD: 9.4 % (ref 4–15)
NEUTROPHILS # BLD AUTO: 6.1 K/UL (ref 1.8–7.7)
NEUTROPHILS NFR BLD: 63.1 % (ref 38–73)
NITRITE UR QL STRIP: NEGATIVE
NRBC BLD-RTO: 0 /100 WBC
PH UR STRIP: 7 [PH] (ref 5–8)
PLATELET # BLD AUTO: 303 K/UL (ref 150–450)
PMV BLD AUTO: 10.7 FL (ref 9.2–12.9)
POCT GLUCOSE: 148 MG/DL (ref 70–110)
POTASSIUM SERPL-SCNC: 3.7 MMOL/L (ref 3.5–5.1)
PROT SERPL-MCNC: 7.7 G/DL (ref 6–8.4)
PROT UR QL STRIP: ABNORMAL
RBC # BLD AUTO: 4.51 M/UL (ref 4–5.4)
RBC #/AREA URNS HPF: 28 /HPF (ref 0–4)
SODIUM SERPL-SCNC: 137 MMOL/L (ref 136–145)
SP GR UR STRIP: 1.02 (ref 1–1.03)
SQUAMOUS #/AREA URNS HPF: 1 /HPF
UNIDENT CRYS URNS QL MICRO: 7
URN SPEC COLLECT METH UR: ABNORMAL
UROBILINOGEN UR STRIP-ACNC: NEGATIVE EU/DL
WBC # BLD AUTO: 9.65 K/UL (ref 3.9–12.7)
WBC #/AREA URNS HPF: 91 /HPF (ref 0–5)
YEAST URNS QL MICRO: ABNORMAL

## 2024-05-21 PROCEDURE — 85025 COMPLETE CBC W/AUTO DIFF WBC: CPT | Performed by: STUDENT IN AN ORGANIZED HEALTH CARE EDUCATION/TRAINING PROGRAM

## 2024-05-21 PROCEDURE — 82962 GLUCOSE BLOOD TEST: CPT

## 2024-05-21 PROCEDURE — 87088 URINE BACTERIA CULTURE: CPT | Performed by: STUDENT IN AN ORGANIZED HEALTH CARE EDUCATION/TRAINING PROGRAM

## 2024-05-21 PROCEDURE — 81000 URINALYSIS NONAUTO W/SCOPE: CPT | Performed by: STUDENT IN AN ORGANIZED HEALTH CARE EDUCATION/TRAINING PROGRAM

## 2024-05-21 PROCEDURE — 87077 CULTURE AEROBIC IDENTIFY: CPT | Performed by: STUDENT IN AN ORGANIZED HEALTH CARE EDUCATION/TRAINING PROGRAM

## 2024-05-21 PROCEDURE — 63600175 PHARM REV CODE 636 W HCPCS: Performed by: STUDENT IN AN ORGANIZED HEALTH CARE EDUCATION/TRAINING PROGRAM

## 2024-05-21 PROCEDURE — 81025 URINE PREGNANCY TEST: CPT | Performed by: STUDENT IN AN ORGANIZED HEALTH CARE EDUCATION/TRAINING PROGRAM

## 2024-05-21 PROCEDURE — 96375 TX/PRO/DX INJ NEW DRUG ADDON: CPT

## 2024-05-21 PROCEDURE — 99285 EMERGENCY DEPT VISIT HI MDM: CPT | Mod: 25

## 2024-05-21 PROCEDURE — 80053 COMPREHEN METABOLIC PANEL: CPT | Performed by: STUDENT IN AN ORGANIZED HEALTH CARE EDUCATION/TRAINING PROGRAM

## 2024-05-21 PROCEDURE — 87086 URINE CULTURE/COLONY COUNT: CPT | Performed by: STUDENT IN AN ORGANIZED HEALTH CARE EDUCATION/TRAINING PROGRAM

## 2024-05-21 PROCEDURE — 87186 SC STD MICRODIL/AGAR DIL: CPT | Performed by: STUDENT IN AN ORGANIZED HEALTH CARE EDUCATION/TRAINING PROGRAM

## 2024-05-21 RX ORDER — KETOROLAC TROMETHAMINE 30 MG/ML
15 INJECTION, SOLUTION INTRAMUSCULAR; INTRAVENOUS
Status: COMPLETED | OUTPATIENT
Start: 2024-05-21 | End: 2024-05-21

## 2024-05-21 RX ORDER — CEFPODOXIME PROXETIL 200 MG/1
200 TABLET, FILM COATED ORAL EVERY 12 HOURS
Qty: 20 TABLET | Refills: 0 | Status: SHIPPED | OUTPATIENT
Start: 2024-05-21 | End: 2024-05-31

## 2024-05-21 RX ORDER — KETOROLAC TROMETHAMINE 10 MG/1
10 TABLET, FILM COATED ORAL EVERY 8 HOURS
Qty: 15 TABLET | Refills: 0 | Status: SHIPPED | OUTPATIENT
Start: 2024-05-21 | End: 2024-05-26

## 2024-05-21 RX ADMIN — KETOROLAC TROMETHAMINE 15 MG: 30 INJECTION, SOLUTION INTRAMUSCULAR; INTRAVENOUS at 10:05

## 2024-05-21 NOTE — Clinical Note
"Karen"Jessica Paredes was seen and treated in our emergency department on 5/21/2024.  She may return to work on 05/23/2024.       If you have any questions or concerns, please don't hesitate to call.      Jeovany Aviles MD"

## 2024-05-22 VITALS
SYSTOLIC BLOOD PRESSURE: 135 MMHG | DIASTOLIC BLOOD PRESSURE: 78 MMHG | BODY MASS INDEX: 44.18 KG/M2 | TEMPERATURE: 99 F | WEIGHT: 234 LBS | RESPIRATION RATE: 18 BRPM | HEART RATE: 62 BPM | OXYGEN SATURATION: 99 % | HEIGHT: 61 IN

## 2024-05-22 DIAGNOSIS — E11.69 TYPE 2 DIABETES MELLITUS WITH OTHER SPECIFIED COMPLICATION, WITHOUT LONG-TERM CURRENT USE OF INSULIN: ICD-10-CM

## 2024-05-22 LAB — POCT GLUCOSE: 191 MG/DL (ref 70–110)

## 2024-05-22 PROCEDURE — 96365 THER/PROPH/DIAG IV INF INIT: CPT

## 2024-05-22 PROCEDURE — 25000003 PHARM REV CODE 250: Performed by: STUDENT IN AN ORGANIZED HEALTH CARE EDUCATION/TRAINING PROGRAM

## 2024-05-22 PROCEDURE — 63600175 PHARM REV CODE 636 W HCPCS: Performed by: STUDENT IN AN ORGANIZED HEALTH CARE EDUCATION/TRAINING PROGRAM

## 2024-05-22 RX ORDER — SEMAGLUTIDE 1.34 MG/ML
1 INJECTION, SOLUTION SUBCUTANEOUS
Qty: 9 ML | Refills: 0 | Status: SHIPPED | OUTPATIENT
Start: 2024-05-22 | End: 2025-05-22

## 2024-05-22 RX ADMIN — CEFTRIAXONE SODIUM 1 G: 1 INJECTION, POWDER, FOR SOLUTION INTRAMUSCULAR; INTRAVENOUS at 12:05

## 2024-05-22 NOTE — ED TRIAGE NOTES
Bilateral flank pain with dysuria x 2 days. + nausea, No fever. C/o lower abdominal discomfort at the end of urination with + frequency. Presents awake, alert.

## 2024-05-22 NOTE — ED PROVIDER NOTES
ED Provider Note - 5/21/2024    History     Chief Complaint   Patient presents with    Flank Pain     Bilateral flank pain x2 days. States she has to strain to urinate. Hx T2DM. Denies nausea/vomiting.        HPI     Karen Paredes is a 28 y.o. year old female with past medical and surgical history as seen below, presenting with chief complaint of flank pain.  Bilateral.  Over the past 2 days.  Associated with urinary frequency and and urge to squeeze harder towards the end of the urination process.  Flank pain radiating to bilateral lower abdominal quadrants.        Past Medical History:   Diagnosis Date    Diabetes mellitus, type 2     Morbid obesity with BMI of 40.0-44.9, adult     Obesity      Past Surgical History:   Procedure Laterality Date    NO PAST SURGERIES      TONSILLECTOMY Bilateral 10/1/2020    Procedure: TONSILLECTOMY;  Surgeon: Meenakshi Cannon MD;  Location: [facility];  Service: ENT;  Laterality: Bilateral;  RN PREOP 9/25/2020   T/S---COVID -NEGATIVE ON 9/30  UPT         Family History   Problem Relation Name Age of Onset    No Known Problems Mother      No Known Problems Father      Breast cancer Neg Hx      Colon cancer Neg Hx      Ovarian cancer Neg Hx      Cervical cancer Neg Hx      Uterine cancer Neg Hx       Social History     Tobacco Use    Smoking status: Never    Smokeless tobacco: Current   Substance Use Topics    Alcohol use: No    Drug use: No     Social Determinants of Health with Concerns     Tobacco Use: High Risk (12/15/2023)    Patient History     Smoking Tobacco Use: Never     Smokeless Tobacco Use: Current     Passive Exposure: Not on file   Financial Resource Strain: Medium Risk (12/15/2023)    Overall Financial Resource Strain (CARDIA)     Difficulty of Paying Living Expenses: Somewhat hard   Food Insecurity: Food Insecurity Present (12/15/2023)    Hunger Vital Sign     Worried About Running Out of Food in the Last Year: Sometimes true     Ran Out of Food in the Last Year:  "Sometimes true   Transportation Needs: Unmet Transportation Needs (12/15/2023)    PRAPARE - Transportation     Lack of Transportation (Medical): Yes     Lack of Transportation (Non-Medical): Yes   Physical Activity: Insufficiently Active (12/15/2023)    Exercise Vital Sign     Days of Exercise per Week: 2 days     Minutes of Exercise per Session: 30 min   Housing Stability: High Risk (12/15/2023)    Housing Stability Vital Sign     Unable to Pay for Housing in the Last Year: Yes     Number of Places Lived in the Last Year: Not on file     Unstable Housing in the Last Year: No   Utilities: Not on file   Health Literacy: Not on file   Social Isolation: Not on file      Review of patient's allergies indicates:  No Known Allergies    Review of Systems     A full Review of Systems (ROS) was performed and was negative unless otherwise stated in the HPI.      Physical Exam     Vitals:    05/21/24 2023 05/22/24 0030   BP: (!) 147/93 135/78   BP Location: Right arm Right arm   Patient Position: Sitting Sitting   Pulse: 103 62   Resp: 14 18   Temp: 98.3 °F (36.8 °C) 98.9 °F (37.2 °C)   TempSrc:  Oral   SpO2: 100% 99%   Weight: 106.1 kg (234 lb)    Height: 5' 1" (1.549 m)         Physical Exam    Nursing note and vitals reviewed.  Constitutional: She appears well-developed and well-nourished. No distress.   HENT:   Head: Normocephalic and atraumatic.   Right Ear: External ear normal.   Left Ear: External ear normal.   Nose: Nose normal.   Mouth/Throat: Oropharynx is clear and moist.   Eyes: Conjunctivae and EOM are normal. Pupils are equal, round, and reactive to light.   Neck: Neck supple.   Normal range of motion.  Cardiovascular:  Normal rate, regular rhythm, normal heart sounds and intact distal pulses.           Pulmonary/Chest: Breath sounds normal. No stridor. No respiratory distress. She has no wheezes. She has no rhonchi. She has no rales.   Abdominal: Abdomen is soft. Bowel sounds are normal. There is no abdominal " tenderness.   Musculoskeletal:         General: No tenderness or edema. Normal range of motion.      Cervical back: Normal range of motion and neck supple.     Neurological: She is alert and oriented to person, place, and time. She has normal strength. No cranial nerve deficit or sensory deficit.   Skin: Skin is warm and dry. No rash noted.   Psychiatric: She has a normal mood and affect. Thought content normal.         Lab Results- Independently reviewed by myself      Labs Reviewed   CULTURE, URINE - Abnormal; Notable for the following components:       Result Value    Urine Culture, Routine   (*)     Value: PRESUMPTIVE PROTEUS SPECIES  >100,000 cfu/ml  Identification and susceptibility pending      All other components within normal limits    Narrative:     Specimen Source->Urine   CBC W/ AUTO DIFFERENTIAL - Abnormal; Notable for the following components:    Hemoglobin 11.9 (*)     Hematocrit 36.1 (*)     MCV 80 (*)     MCH 26.4 (*)     All other components within normal limits   COMPREHENSIVE METABOLIC PANEL - Abnormal; Notable for the following components:    CO2 22 (*)     Glucose 133 (*)     All other components within normal limits   URINALYSIS, REFLEX TO URINE CULTURE - Abnormal; Notable for the following components:    Appearance, UA Hazy (*)     Protein, UA 1+ (*)     Occult Blood UA 3+ (*)     Leukocytes, UA 3+ (*)     All other components within normal limits    Narrative:     Specimen Source->Urine   URINALYSIS MICROSCOPIC - Abnormal; Notable for the following components:    RBC, UA 28 (*)     WBC, UA 91 (*)     Yeast, UA Few (*)     All other components within normal limits    Narrative:     Specimen Source->Urine   POCT GLUCOSE - Abnormal; Notable for the following components:    POCT Glucose 148 (*)     All other components within normal limits   POCT GLUCOSE - Abnormal; Notable for the following components:    POCT Glucose 191 (*)     All other components within normal limits   POCT URINE PREGNANCY            Imaging     Imaging Results              CT Renal Stone Study ABD Pelvis WO (Final result)  Result time 05/21/24 23:01:49      Final result by Man Dietz MD (05/21/24 23:01:49)                   Impression:      Probable cyst right ovary.  Correlate for dominant follicle or endometrioma.    No evidence of  tract stone or obstruction.    Question thickened wall of the incompletely distended urinary bladder.  Correlate for cystitis.    Normal appendix.      Electronically signed by: Man iDetz  Date:    05/21/2024  Time:    23:01               Narrative:    EXAMINATION:  CT RENAL STONE STUDY ABD PELVIS WO    CLINICAL HISTORY:  Flank pain, kidney stone suspected;    TECHNIQUE:  Low dose axial images, sagittal and coronal reformations were obtained from the lung bases to the pubic symphysis.  Contrast was not administered.    COMPARISON:  None    FINDINGS:  Heart: Normal in size. No pericardial effusion.    Lung Bases: Well aerated, without consolidation or pleural fluid.    Liver: Normal in size and attenuation, with no focal hepatic lesions.    Gallbladder: No calcified gallstones.    Bile Ducts: No evidence of dilated ducts.    Pancreas: No mass or peripancreatic fat stranding.    Spleen: Unremarkable.    Adrenals: Unremarkable.    Kidneys/ Ureters: Unremarkable.  Calcification in the pelvis on the right left appear to represent phleboliths.    Bladder: Incomplete distension of the urinary bladder with thickened wall.    Reproductive organs: 18 mm cyst or follicle of the right ovary..    GI Tract/Mesentery: No evidence of bowel obstruction or inflammation.  Normal pelvic appendix.    Peritoneal Space: No ascites. No free air.    Retroperitoneum: No significant adenopathy.    Abdominal wall: Fat containing hernia above and at the umbilicus.  No bowel involvement.    Vasculature: No significant atherosclerosis or aneurysm.    Bones: No acute fracture.                                                  ED Course         Procedures         Orders Placed This Encounter    Urine culture    CT Renal Stone Study ABD Pelvis WO    CBC auto differential    Comprehensive metabolic panel    Urinalysis, Reflex to Urine Culture Urine, Clean Catch    Urinalysis Microscopic    POCT urine pregnancy    Insert Saline lock IV    POCT glucose    POCT glucose    ketorolac injection 15 mg    cefTRIAXone (Rocephin) 1 g in dextrose 5 % in water (D5W) 100 mL IVPB (MB+)    cefpodoxime (VANTIN) 200 MG tablet    ketorolac (TORADOL) 10 mg tablet                      Medical Decision Making       The patient's list of active medical problems, social history, medications, and allergies as documented per RN staff has been reviewed.           Medical Decision Making  Patient presenting with flank/back pain and urinary symptoms. Differential included UTI, pyelonephritis, diverticulitis, nephrolithiasis, appendicitis, cholangitis. Also considered but less likely given history and physical exam included constipation, bowel perforation, gastritis, pancreatitis, mesenteric ischemia, torsion.  UA showing signs of possible infection. Given this, feel pyelonephritis is the most likely cause of symptoms. Abx coverage based upon local patterns of infection, Ceftriaxone + cefpodoxime. Cultures pending. Follow up with PCP this week. Return precautions given for worsening or changing symptoms.      Amount and/or Complexity of Data Reviewed  Labs: ordered.  Radiology: ordered and independent interpretation performed.     Details: No free air or urolith    Risk  Prescription drug management.                    ED Prescriptions       Medication Sig Dispense Start Date End Date Auth. Provider    cefpodoxime (VANTIN) 200 MG tablet Take 1 tablet (200 mg total) by mouth every 12 (twelve) hours. for 10 days 20 tablet 5/21/2024 5/31/2024 Jeovany Aviles MD    ketorolac (TORADOL) 10 mg tablet Take 1 tablet (10 mg total) by mouth every 8 (eight) hours. for 5  days 15 tablet 5/21/2024 5/26/2024 Jeovany Avlies MD              Clinical Impression       Follow-up Information       Follow up With Specialties Details Why Contact Kaleb Werner III, MD Internal Medicine Schedule an appointment as soon as possible for a visit in 5 days For follow-up on today's visit. [address] [facility] [address]  [phone]      [facility] Emergency Dept Emergency Medicine Go to  As needed, If symptoms worsen [address] [facility] [facility] [address] [phone]            Referrals:  No orders of the defined types were placed in this encounter.      Disposition   ED Disposition Condition    Discharge Stable            Diagnosis    ICD-10-CM ICD-9-CM   1. Pyelonephritis  N12 590.80           Jeovany Aviles MD        05/23/2024          DISCLAIMER: This note was prepared with [employer] voice recognition transcription software. Garbled syntax, mangled pronouns, and other bizarre constructions may be attributed to that software system.       Jeovany Aviles MD  05/23/24 0008

## 2024-05-22 NOTE — TELEPHONE ENCOUNTER
No care due was identified.  Health [facility] Embedded Care Due Messages. Reference number: [id number].   5/22/2024 1:37:39 AM CDT

## 2024-05-22 NOTE — TELEPHONE ENCOUNTER
Refill Decision Note   Karen Paredes  is requesting a refill authorization.  Brief Assessment and Rationale for Refill:  Approve     Medication Therapy Plan:  Reviewed acute care/admission visit notes; no drug changes; approved 90+0 to bridge to FOV      Extended chart review required: Yes   Comments:     Note composed:1:47 PM 05/22/2024

## 2024-05-24 LAB — BACTERIA UR CULT: ABNORMAL

## 2024-06-14 ENCOUNTER — TELEPHONE (OUTPATIENT)
Dept: BARIATRICS | Facility: CLINIC | Age: 29
End: 2024-06-14
Payer: COMMERCIAL

## 2024-06-17 ENCOUNTER — PATIENT OUTREACH (OUTPATIENT)
Dept: ADMINISTRATIVE | Facility: OTHER | Age: 29
End: 2024-06-17
Payer: COMMERCIAL

## 2024-06-17 ENCOUNTER — OFFICE VISIT (OUTPATIENT)
Dept: INTERNAL MEDICINE | Facility: CLINIC | Age: 29
End: 2024-06-17
Payer: COMMERCIAL

## 2024-06-17 VITALS
OXYGEN SATURATION: 98 % | BODY MASS INDEX: 45.12 KG/M2 | WEIGHT: 239 LBS | HEIGHT: 61 IN | DIASTOLIC BLOOD PRESSURE: 76 MMHG | HEART RATE: 81 BPM | SYSTOLIC BLOOD PRESSURE: 126 MMHG

## 2024-06-17 DIAGNOSIS — E66.01 CLASS 3 SEVERE OBESITY DUE TO EXCESS CALORIES WITH SERIOUS COMORBIDITY AND BODY MASS INDEX (BMI) OF 45.0 TO 49.9 IN ADULT: ICD-10-CM

## 2024-06-17 DIAGNOSIS — U07.1 COVID-19 VIRUS DETECTED: ICD-10-CM

## 2024-06-17 DIAGNOSIS — E11.69 TYPE 2 DIABETES MELLITUS WITH OTHER SPECIFIED COMPLICATION, WITHOUT LONG-TERM CURRENT USE OF INSULIN: ICD-10-CM

## 2024-06-17 PROBLEM — E66.813 CLASS 3 SEVERE OBESITY DUE TO EXCESS CALORIES WITH SERIOUS COMORBIDITY AND BODY MASS INDEX (BMI) OF 45.0 TO 49.9 IN ADULT: Status: ACTIVE | Noted: 2018-05-30

## 2024-06-17 PROCEDURE — 99214 OFFICE O/P EST MOD 30 MIN: CPT | Mod: S$GLB,,, | Performed by: INTERNAL MEDICINE

## 2024-06-17 PROCEDURE — 3074F SYST BP LT 130 MM HG: CPT | Mod: CPTII,S$GLB,, | Performed by: INTERNAL MEDICINE

## 2024-06-17 PROCEDURE — 99999 PR PBB SHADOW E&M-EST. PATIENT-LVL IV: CPT | Mod: PBBFAC,,, | Performed by: INTERNAL MEDICINE

## 2024-06-17 PROCEDURE — 1160F RVW MEDS BY RX/DR IN RCRD: CPT | Mod: CPTII,S$GLB,, | Performed by: INTERNAL MEDICINE

## 2024-06-17 PROCEDURE — 3078F DIAST BP <80 MM HG: CPT | Mod: CPTII,S$GLB,, | Performed by: INTERNAL MEDICINE

## 2024-06-17 PROCEDURE — 3044F HG A1C LEVEL LT 7.0%: CPT | Mod: CPTII,S$GLB,, | Performed by: INTERNAL MEDICINE

## 2024-06-17 PROCEDURE — 1159F MED LIST DOCD IN RCRD: CPT | Mod: CPTII,S$GLB,, | Performed by: INTERNAL MEDICINE

## 2024-06-17 PROCEDURE — 3008F BODY MASS INDEX DOCD: CPT | Mod: CPTII,S$GLB,, | Performed by: INTERNAL MEDICINE

## 2024-06-17 PROCEDURE — G2211 COMPLEX E/M VISIT ADD ON: HCPCS | Mod: S$GLB,,, | Performed by: INTERNAL MEDICINE

## 2024-06-17 RX ORDER — ONDANSETRON 4 MG/1
4 TABLET, ORALLY DISINTEGRATING ORAL EVERY 6 HOURS PRN
Qty: 20 TABLET | Refills: 0 | Status: SHIPPED | OUTPATIENT
Start: 2024-06-17

## 2024-06-17 NOTE — PROGRESS NOTES
Assessment:         1. Type 2 diabetes mellitus with other specified complication, without long-term current use of insulin    2. Class 3 severe obesity due to excess calories with serious comorbidity and body mass index (BMI) of 45.0 to 49.9 in adult    3. COVID-19 virus detected          Plan:           Karen was seen today for follow-up.    Diagnoses and all orders for this visit:    Type 2 diabetes mellitus with other specified complication, without long-term current use of insulin  Chronic  Controlled  Patient is at goal today   I have reviewed lifestyle modification to achieve/maintain goals  We will continue the current medication regimen as listed below  Patient will follow up in 6 months   -     Basic Metabolic Panel; Standing  -     Hemoglobin A1C; Standing  -     Hepatic Function Panel; Standing  -     Lipid Panel; Standing  -     Microalbumin/Creatinine Ratio, Urine    Class 3 severe obesity due to excess calories with serious comorbidity and body mass index (BMI) of 45.0 to 49.9 in adult    COVID-19 virus detected  -     ondansetron (ZOFRAN-ODT) 4 MG TbDL; Take 1 tablet (4 mg total) by mouth every 6 (six) hours as needed (nausea).          Eye exam is completed with [facility]   Has appointemtn bariatrics next week  Does get nausea with ozempic but just got 3 month supply, will take and in 3 month if still a problem swich to mounjaro   Foot exam is wnl  Fu in 3 month with prelabs     Subjective:       Patient ID: Karen Paredes is a 28 y.o. female.    Chief Complaint: Follow-up      Interim Hx  Last seen by raiza davila December  Seen in ED for flank pain , suspected peylo     Concerns today  fu      Chronic problems      Diabetes  She presents for her follow-up diabetic visit. She has type 2 diabetes mellitus. Her disease course has been stable. Current diabetic treatments: ozempic. She does not see a podiatrist.Eye exam is not current.       Review of Systems   All other systems reviewed and are  "negative.            Health Maintenance Due   Topic Date Due    Foot Exam  Never done    Eye Exam   6/19/2024  3:00 PM Inna FarrellAaron, OD [facility]    10/07/2022    COVID-19 Vaccine (3 - 2023-24 season) 09/01/2023         Objective:     /76 (BP Location: Right arm, Patient Position: Sitting, BP Method: Medium (Manual))   Pulse 81   Ht 5' 1" (1.549 m)   Wt 108.4 kg (238 lb 15.7 oz)   LMP  (LMP Unknown)   SpO2 98%   BMI 45.15 kg/m²         6/17/2024     3:23 PM 5/21/2024     8:23 PM 12/15/2023     3:56 PM 9/22/2023     8:45 AM 7/26/2023     2:36 PM   Vitals   Height 5' 1" (1.549 m) 5' 1" (1.549 m)  5' (1.524 m) 5' (1.524 m)   Weight (lbs) 238.98 234 227 238 238   BMI (kg/m2) 45.2 44.2  46.5 46.5                Physical Exam  HENT:      Head: Normocephalic.   Cardiovascular:      Rate and Rhythm: Normal rate and regular rhythm.      Pulses:           Dorsalis pedis pulses are 2+ on the right side and 2+ on the left side.        Posterior tibial pulses are 2+ on the right side and 2+ on the left side.      Heart sounds: No murmur heard.     No friction rub. No gallop.   Pulmonary:      Effort: Pulmonary effort is normal. No respiratory distress.      Breath sounds: Normal breath sounds. No stridor.   Abdominal:      General: Abdomen is flat. There is no distension.   Musculoskeletal:      Right foot: Normal range of motion. No deformity or bunion.      Left foot: Normal range of motion. No deformity or bunion.   Feet:      Right foot:      Protective Sensation: 10 sites tested.  10 sites sensed.      Skin integrity: No ulcer, blister, skin breakdown, erythema or warmth.      Left foot:      Protective Sensation: 10 sites tested.  10 sites sensed.      Skin integrity: No ulcer, blister, skin breakdown, erythema or warmth.   Neurological:      General: No focal deficit present.      Mental Status: She is alert and oriented to person, place, and time.               ASCVD  The ASCVD Risk score (Rozet SREE, " "et al., 2019) failed to calculate for the following reasons:    The 2019 ASCVD risk score is only valid for ages 40 to 79    Basic Labs    BMP  Lab Results   Component Value Date     05/21/2024    K 3.7 05/21/2024     05/21/2024    CO2 22 (L) 05/21/2024    BUN 9 05/21/2024    CREATININE 0.8 05/21/2024    CALCIUM 9.0 05/21/2024    ANIONGAP 10 05/21/2024    ESTGFRAFRICA >60.0 07/08/2022    EGFRNONAA >60.0 07/08/2022     Lab Results   Component Value Date    EGFRNORACEVR >60 05/21/2024       Lab Results   Component Value Date    ALT 14 05/21/2024    AST 13 05/21/2024    ALKPHOS 66 05/21/2024    BILITOT 0.3 05/21/2024         No results found for: "TSH"  Lab Results   Component Value Date    WBC 9.65 05/21/2024    HGB 11.9 (L) 05/21/2024    HCT 36.1 (L) 05/21/2024    MCV 80 (L) 05/21/2024     05/21/2024           STD  Lab Results   Component Value Date    [id number] Negative 09/17/2021     Lab Results   Component Value Date    RPR Non-reactive 09/17/2021     Lab Results   Component Value Date    HEPCAB Negative 09/17/2021     No results found for: "LABNGO", "LABCHLA"        Lipids  Lab Results   Component Value Date    CHOL 124 04/25/2024     Lab Results   Component Value Date    HDL 39 (L) 04/25/2024     Lab Results   Component Value Date    LDLCALC 70.0 04/25/2024     Lab Results   Component Value Date    TRIG 75 04/25/2024     Lab Results   Component Value Date    CHOLHDL 31.5 04/25/2024       DM  Lab Results   Component Value Date    HGBA1C 6.0 (H) 04/25/2024    HGBA1C 6.0 (H) 04/25/2024           Future Appointments   Date Time Provider Department Center   6/25/2024  8:30 AM Génesis Ortiz NP [facility]   6/25/2024  9:30 AM Isabell Rangel [facility]   8/16/2024 12:45 PM Dionte Sam MD [facility]   9/19/2024  7:00 AM LAB, [facility] [place]   9/26/2024  9:40 AM [facility] [place]         Medication List with Changes/Refills "   Current Medications    BLOOD-GLUCOSE METER MISC    as per administration instructions    ETONOGESTREL (NEXPLANON SDRM)    by Subdermal route.    SEMAGLUTIDE (OZEMPIC) 1 MG/DOSE (4 MG/3 ML)    Inject 1 mg into the skin every 7 days.    VALACYCLOVIR (VALTREX) 500 MG TABLET    Take 500 mg by mouth 2 (two) times daily.   Changed and/or Refilled Medications    Modified Medication Previous Medication    ONDANSETRON (ZOFRAN-ODT) 4 MG TBDL ondansetron (ZOFRAN-ODT) 4 MG TbDL       Take 1 tablet (4 mg total) by mouth every 6 (six) hours as needed (nausea).    Take 1 tablet (4 mg total) by mouth every 6 (six) hours as needed (nausea).         Disclaimer:  This note has been generated using voice-recognition software. There may be grammatical or spelling errors that have been missed during proof-reading Visit complexity inherent to evaluation and management associated with medical care services that serve as the continuing focal point for all needed health care services and/or with medical care services that are part of ongoing care related to a patient's single, serious condition or a complex condition

## 2024-06-20 NOTE — PROGRESS NOTES
CHW - Outreach Attempt    Community Health Worker left a In Basket message for 1st attempt to contact patient regarding: SDOH  Community Health Worker to attempt to contact patient on: 6/27  CHW - Case Closure    This Community Health Worker spoke to patient via telephone today.   Pt/Caregiver reported: Pt denied needing any assistance at this time. SDOH completed verified updated by CHW  Pt/Caregiver denied any additional needs at this time and agrees with episode closure at this time.  Provided patient with Community Health Worker's contact information and encouraged him/her to contact this Community Health Worker if additional needs arise.

## 2024-06-25 ENCOUNTER — OFFICE VISIT (OUTPATIENT)
Dept: BARIATRICS | Facility: CLINIC | Age: 29
End: 2024-06-25
Payer: COMMERCIAL

## 2024-06-25 ENCOUNTER — CLINICAL SUPPORT (OUTPATIENT)
Dept: BARIATRICS | Facility: CLINIC | Age: 29
End: 2024-06-25
Payer: COMMERCIAL

## 2024-06-25 VITALS
DIASTOLIC BLOOD PRESSURE: 83 MMHG | HEART RATE: 88 BPM | OXYGEN SATURATION: 100 % | HEIGHT: 61 IN | WEIGHT: 239 LBS | BODY MASS INDEX: 45.12 KG/M2 | SYSTOLIC BLOOD PRESSURE: 130 MMHG

## 2024-06-25 DIAGNOSIS — E66.01 MORBID OBESITY WITH BMI OF 45.0-49.9, ADULT: ICD-10-CM

## 2024-06-25 DIAGNOSIS — E66.01 CLASS 3 SEVERE OBESITY DUE TO EXCESS CALORIES WITH SERIOUS COMORBIDITY AND BODY MASS INDEX (BMI) OF 45.0 TO 49.9 IN ADULT: Primary | ICD-10-CM

## 2024-06-25 DIAGNOSIS — Z71.3 DIETARY COUNSELING AND SURVEILLANCE: ICD-10-CM

## 2024-06-25 DIAGNOSIS — E11.69 TYPE 2 DIABETES MELLITUS WITH OTHER SPECIFIED COMPLICATION, WITHOUT LONG-TERM CURRENT USE OF INSULIN: ICD-10-CM

## 2024-06-25 DIAGNOSIS — E11.69 TYPE 2 DIABETES MELLITUS WITH OTHER SPECIFIED COMPLICATION, WITHOUT LONG-TERM CURRENT USE OF INSULIN: Primary | ICD-10-CM

## 2024-06-25 PROCEDURE — 3008F BODY MASS INDEX DOCD: CPT | Mod: CPTII,S$GLB,, | Performed by: NURSE PRACTITIONER

## 2024-06-25 PROCEDURE — 99999 PR PBB SHADOW E&M-EST. PATIENT-LVL V: CPT | Mod: PBBFAC,,, | Performed by: NURSE PRACTITIONER

## 2024-06-25 PROCEDURE — 3044F HG A1C LEVEL LT 7.0%: CPT | Mod: CPTII,S$GLB,, | Performed by: NURSE PRACTITIONER

## 2024-06-25 PROCEDURE — 1160F RVW MEDS BY RX/DR IN RCRD: CPT | Mod: CPTII,S$GLB,, | Performed by: NURSE PRACTITIONER

## 2024-06-25 PROCEDURE — 1159F MED LIST DOCD IN RCRD: CPT | Mod: CPTII,S$GLB,, | Performed by: NURSE PRACTITIONER

## 2024-06-25 PROCEDURE — 99999 PR PBB SHADOW E&M-EST. PATIENT-LVL I: CPT | Mod: PBBFAC,,, | Performed by: DIETITIAN, REGISTERED

## 2024-06-25 PROCEDURE — 3075F SYST BP GE 130 - 139MM HG: CPT | Mod: CPTII,S$GLB,, | Performed by: NURSE PRACTITIONER

## 2024-06-25 PROCEDURE — 99499 UNLISTED E&M SERVICE: CPT | Mod: S$GLB,,, | Performed by: DIETITIAN, REGISTERED

## 2024-06-25 PROCEDURE — 99205 OFFICE O/P NEW HI 60 MIN: CPT | Mod: S$GLB,,, | Performed by: NURSE PRACTITIONER

## 2024-06-25 PROCEDURE — 3079F DIAST BP 80-89 MM HG: CPT | Mod: CPTII,S$GLB,, | Performed by: NURSE PRACTITIONER

## 2024-06-25 NOTE — PROGRESS NOTES
BARIATRIC NEW PATIENT EVALUATION    CHIEF COMPLAINT:   Morbid obesity, body mass index is 45.9 kg/m². and inability to lose weight.    HPI:  Karen Paredes is a 28 y.o. morbidly obese female. Her current body mass index is 45.9 kg/m². She has multiple associated comorbidities including diabetes mellitus type 2 insulin dependent  with out complications.  She has struggled with excess weight since 2020.  Her highest adult weight was 238 lbs at age 28, and her lowest adult weight was 165 lbs at age 18.  The patient has tried calorie counting, Keto diet, exercise and ozempic.  The patient was most successful with exercise and calorie counting  with a weight loss of 10 lbs.  Her current exercise includes none 0 times a week. She denies any history of eating disorder such as anorexia, bulimia, or taking laxatives for weight loss, and denies any addiction including illicit substances, alcohol, or gambling.  Patient states she has a good  support system.  She lives with alone.  She is currently employed   .  She  denies a history of GERD.  The patient's goal is 170-180  lbs.    ESS: Score of 6, reviewed 06/25/2024.  Does not need Sleep Study.    Pre op weight-238   IBW-126     PAST MEDICAL HISTORY:  Past Medical History:   Diagnosis Date    Diabetes mellitus, type 2     Morbid obesity with BMI of 40.0-44.9, adult     Obesity        PAST SURGICAL HISTORY:  Past Surgical History:   Procedure Laterality Date    NO PAST SURGERIES      TONSILLECTOMY Bilateral 10/01/2020    Procedure: TONSILLECTOMY;  Surgeon: Meenakshi Cannon MD;  Location: [facility];  Service: ENT;  Laterality: Bilateral;  RN PREOP 9/25/2020   T/S---COVID -NEGATIVE ON 9/30  UPT    TONSILLECTOMY  10/2020       FAMILY HISTORY:  Family History   Problem Relation Name Age of Onset    No Known Problems Mother      No Known Problems Father      Stroke Maternal Grandfather Alessia Paredes     Diabetes Maternal Grandmother Mary Paredes     Breast cancer Neg Hx       Colon cancer Neg Hx      Ovarian cancer Neg Hx      Cervical cancer Neg Hx      Uterine cancer Neg Hx          SOCIAL HISTORY:  Social History     Socioeconomic History    Marital status: Single   Tobacco Use    Smoking status: Never     Passive exposure: Past    Smokeless tobacco: Current   Substance and Sexual Activity    Alcohol use: Yes     Alcohol/week: 4.0 standard drinks of alcohol     Types: 2 Glasses of wine, 2 Shots of liquor per week     Comment: occ    Drug use: No    Sexual activity: Yes     Partners: Male     Birth control/protection: Condom, Implant     Comment: last encounter, 2 months, lmp currentl no regular    Social History Narrative        College/university ; [employer], [employer] Justice         Working ; sleep number ;          /Single    none        Children; none         Lives with ;self         Diet/Exericse             Social Determinants of Health     Financial Resource Strain: Medium Risk (12/15/2023)    Overall Financial Resource Strain (CARDIA)     Difficulty of Paying Living Expenses: Somewhat hard   Food Insecurity: Food Insecurity Present (12/15/2023)    Hunger Vital Sign     Worried About Running Out of Food in the Last Year: Sometimes true     Ran Out of Food in the Last Year: Sometimes true   Transportation Needs: Unmet Transportation Needs (12/15/2023)    PRAPARE - Transportation     Lack of Transportation (Medical): Yes     Lack of Transportation (Non-Medical): Yes   Physical Activity: Insufficiently Active (12/15/2023)    Exercise Vital Sign     Days of Exercise per Week: 2 days     Minutes of Exercise per Session: 30 min   Stress: No Stress Concern Present (12/15/2023)    [employer] [place] [employer] - Occupational Stress Questionnaire     Feeling of Stress : Only a little   Housing Stability: High Risk (12/15/2023)    Housing Stability Vital Sign     Unable to Pay for Housing in the Last Year: Yes     Unstable Housing in the Last Year: No  "      MEDICATIONS:    Current Outpatient Medications:     etonogestrel (NEXPLANON SDRM), by Subdermal route., Disp: , Rfl:     ondansetron (ZOFRAN-ODT) 4 MG TbDL, Take 1 tablet (4 mg total) by mouth every 6 (six) hours as needed (nausea)., Disp: 20 tablet, Rfl: 0    semaglutide (OZEMPIC) 1 mg/dose (4 mg/3 mL), Inject 1 mg into the skin every 7 days., Disp: 9 mL, Rfl: 0    valACYclovir (VALTREX) 500 MG tablet, Take 500 mg by mouth 2 (two) times daily., Disp: , Rfl:     blood-glucose meter Misc, as per administration instructions (Patient not taking: Reported on 6/17/2024), Disp: , Rfl:   No current facility-administered medications for this visit.    Facility-Administered Medications Ordered in Other Visits:     lidocaine (PF) 10 mg/ml (1%) injection 10 mg, 1 mL, Intradermal, Once, Meenakshi Cannon MD    sodium chloride 0.9% flush 10 mL, 10 mL, Intravenous, PRN, Meenakshi Cannon MD    ALLERGIES:  Review of patient's allergies indicates:  No Known Allergies    Review of Systems   Constitutional:  Negative for chills and fever.   HENT:  Negative for ear pain, nosebleeds and sore throat.    Eyes:  Negative for blurred vision and double vision.   Respiratory:  Negative for cough and shortness of breath.    Cardiovascular:  Negative for chest pain, palpitations, orthopnea, claudication and leg swelling.   Gastrointestinal:  Negative for abdominal pain, constipation, diarrhea, heartburn, nausea and vomiting.   Genitourinary:  Negative for dysuria and urgency.   Musculoskeletal:  Negative for back pain and joint pain.   Skin:  Negative for rash.   Neurological:  Negative for dizziness, tingling, focal weakness and headaches.   Endo/Heme/Allergies:  Does not bruise/bleed easily.   Psychiatric/Behavioral:  Negative for depression and suicidal ideas.        Vitals:    06/25/24 0846   BP: 130/83   Pulse: 88   SpO2: 100%   Weight: 108.4 kg (238 lb 15.7 oz)   Height: 5' 0.5" (1.537 m)   PainSc: 0-No pain       Physical " Exam  Vitals and nursing note reviewed.   Constitutional:       Appearance: She is well-developed. She is morbidly obese.   HENT:      Head: Normocephalic.      Nose: Nose normal.      Mouth/Throat:      Mouth: Mucous membranes are moist.   Eyes:      Extraocular Movements: Extraocular movements intact.   Cardiovascular:      Rate and Rhythm: Normal rate and regular rhythm.      Heart sounds: Normal heart sounds.   Pulmonary:      Effort: Pulmonary effort is normal.      Breath sounds: Normal breath sounds.   Abdominal:      General: Bowel sounds are normal.      Palpations: Abdomen is soft.   Musculoskeletal:         General: Normal range of motion.      Cervical back: Normal range of motion.   Skin:     General: Skin is warm and dry.      Capillary Refill: Capillary refill takes less than 2 seconds.   Neurological:      Mental Status: She is alert and oriented to person, place, and time.   Psychiatric:         Mood and Affect: Mood normal.          DIAGNOSIS:  1. Morbid obesity, body mass index is 45.9 kg/m². and inability to lose weight.  2. Co-morbidities: diabetes mellitus type 2 insulin dependent  with out complications    PLAN:  The patient is a good candidate for Bariatric Surgery. The patient is interested in laparoscopic savanna-en-y gastric bypass with Dr. Betancourt. The surgery and post-op care was discussed in detail with the patient. All questions were answered.    The patient understands that bariatric surgery is a tool to aid in weight loss and that they need to be committed to the diet and exercise post-operatively for successful weight loss. Discussed with patient that bariatric surgery is not the easy way out and that it will take plenty of dedication on the patient's part to be successful. Also discussed the possibility of weight regain if the patient strays from the diet guidelines or exercise requirements. Patient verbalized understanding and wishes to proceed with the work-up.    Estimated Goal  weight is 180 lbs.    Discussed hyper fertility and no NSAIDS post op    ORDERS:  1. Chest X-Ray, EKG, and Barium UGI  2. Psychological Consult and Bariatric Dietician Consult  3. Bariatric Labs: Per orders.    PCP: Kaleb Mchugh III, MD  RTC: As scheduled.    I spent a total of 60 minutes on the day of the visit.This includes face to face time and non-face to face time preparing to see the patient (eg, review of tests), obtaining and/or reviewing separately obtained history, documenting clinical information in the electronic or other health record, independently interpreting results and communicating results to the patient/family/caregiver, or care coordinator.

## 2024-06-25 NOTE — PATIENT INSTRUCTIONS
Prior to surgery you will need to complete:  - Dietitian consult and follow up appointments as needed  - Labs  - Chest X-ray  - EKG  - Psychological evaluation, Please call psychiatry [phone] to schedule  - UGI      In preparation for bariatric surgery, please complete the following:   Discuss your current medications with your primary care provider, remember your medications will need to be crushed, chewable, or in liquid form for the first 2-4 weeks after a gastric bypass or sleeve.  For a gastric band, your medications will need to be crushed indefinitely.    If you take a blood thinner such as: Coumadin (warfarin), Pradaxa (dabigatran), or Plavix (clopidogrel), you will need to speak with your prescribing provider on how or if this medication can be stopped before surgery.   If you take a medication for depression or anxiety, remember to discuss this with the psychologist or psychiatrist that you see.   If you take medication for arthritis on a daily basis that is considered a non-steroidal anti-inflammatory (NSAID), please discuss with your prescribing physician an alternative medication.  After having gastric bypass or gastric sleeve, this group of medications is not appropriate to take due to increased risk of bleeding stomach ulcers.      DEFINITIONS  Appointments: Pre-scheduled meetings or consultations with any physician, advanced practice provider, dietitian, or psychologist, and labs, imaging studies, sleep studies, etc.   Late cancellation: Cancelling an appointment 24-48 hours prior to scheduled time.  No-Show appointment:  is when   You do NOT arrive to your appointment at the time its scheduled.  You call to cancel or cancel via [facility] less than 24 hours in advance of your scheduled appointment  You show up 15 minutes AFTER your scheduled appointment time without any notification of being late.     POLICY  You are allowed up to 3 cancellations for appointments.   After 3 cancellations your  case will be placed on hold for 2 months and after that time you can resume the program.   You are allowed only 1 no-show for an appointment.   You will be re-scheduled one time and if there is a 2nd no-show at any point, your case will be placed on hold for 3 months.  After 3 months you can resume the program.     Upon resuming the program after being placed on hold for either above mentioned reasons, if you have a late cancel or no show for any appointment, the bariatric team will review if youre an appropriate candidate for surgery at the monthly meeting.

## 2024-06-25 NOTE — PROGRESS NOTES
NUTRITIONAL CONSULT    Referring Physician: Yue Betancourt M.D.   Reason for MNT Referral: Initial assessment for laparoscopic Prasanth-en-Y work-up    PAST MEDICAL HISTORY:   28 y.o. female  Weight history includes She has struggled with excess weight since 2020.  Prior to that she was comfortable at a weight of 160-180 lbs. Started working from home and was much less active. Her highest adult weight is her current weight, and her lowest adult weight was 165 lbs at age 18.  The patient has tried calorie counting, Keto diet, exercise with a  and ozempic.  The patient was most successful with exercise and calorie counting with a weight loss of 10 lbs. Has been on Ozempic since last year which has been helping with DM and weight loss. She does get side effects of nausea and stomach pains for 3-4 days after the injection, so she admits she skips doses. Admits she is picky about vegetables.    Past Medical History:   Diagnosis Date    Diabetes mellitus, type 2     Morbid obesity with BMI of 40.0-44.9, adult     Obesity      CLINICAL DATA:  28 y.o.-year-old Black or  female.  Height: 5 ft.  Weight: 238 lbs  IBW: 126 lbs  BMI: 45.9  The patient's goal weight (50% EBW): 160-182 lbs  Personal goal weight: 170-180 lbs    Goal for Bariatric Surgery: to improve health, to improve quality of life, to lose weight, and to prevent future medical conditions    DAILY NUTRITIONAL NEEDS: pre-op nutritional bariatric guidelines to promote weight loss  [id number] Calories    Grams Protein    NUTRITION & HEALTH HISTORY:  Greatest challenge: dining out frequency, starchy CHO, and irregular meal patterns    Current diet recall:     B: skips OR 2 eggs and 2 toast OR sausage biscuit and coffee frappe   Sn: sunflower seeds, pb crackers  D: Subway 6in sandwich, chips, lemonade/powderade    Current Diet:  Meal pattern: Irregular (jailyn since taking Ozempic)  Protein supplements: none  Snacking: sunflower seeds, pb  crackers  Vegetables: Likes a few. Eats never.  Fruits: Likes a variety. Eats rarely.  Beverages: water, sugary beverages, and coffee with sugar. Likes Gatorade and lemonade and fruit juices.  Dining out: Daily. Weekly. Mostly fast food, restaurants, and take-out. Subway sandwiches, [employer], soup from restaurant, pizza, Al's breakfast  Cooking at home: Reduced lately, jailyn since taking Ozempic and not wanting to eat leftovers.     Exercise:  Past exercise: off and on    Current exercise: Recently started walking early mornings 5am for about 2 hours, once/week on Sundays    Restrictions to exercise: none, foot pain from an old injury    Vitamins / Minerals / Herbs:   Mv gummies    Labs:   None available at time of visit    Food Allergies:   None known    Social:  Works from home  Lives alone.  Grocery shopping and food prep rarely done.  Patient believes her mom and sister will be supportive after surgery.  Alcohol: Socially.  Smoking: None.    ASSESSMENT:  Patient reports attempts at weight loss, only to regain lost weight.  Patient demonstrated knowledge of healthy eating behaviors and exercise patterns; admits to not eating healthy and not exercising at this point.  Patient states willingness to change lifestyle and make behavior modifications.        Barriers to Education: none    Stage of change: determination    NUTRITION DIAGNOSIS:    Morbid Obesity related to Excessive carbohydrate intake, Excessive calorie intake, and Physical inactivity as evidence by BMI.    BARIATRIC DIET DISCUSSION/PLAN:  Discussed diet after surgery and related to patient's food record.  Reviewed nutrition guidelines for before and after surgery.  Answered all questions.  Continue to review Bariatric Nutrition Guidebook at home and call with any questions.  Work on Bariatric Nutrition Checklist.  Work on expanding variety of vegetables.  Work on gradually cutting back on starchy CHO in the diet.  Begin trying various protein  supplements to determine preference.  1200-calorie diet.  1500-calorie diet.  5-6 meals per day.  Start including protein supplements in the diet plan daily.  Reduce frequency of dining out.  More grocery shopping and meal preparation at home.  Increase exercise.  Eliminate sugary drinks.    RECOMMENDATIONS:  Pt is a potential candidate for bariatric surgery - Bypass.    Follow up in one month.  Needs additional visits with RD to work on dietary and lifestyle changes in preparation for bariatric surgery.    Patient verbalized understanding.      Communicated nutrition plan with bariatric team.    SESSION TIME:  60 minutes

## 2024-07-03 ENCOUNTER — PATIENT MESSAGE (OUTPATIENT)
Dept: BARIATRICS | Facility: CLINIC | Age: 29
End: 2024-07-03
Payer: COMMERCIAL

## 2024-07-08 ENCOUNTER — HOSPITAL ENCOUNTER (OUTPATIENT)
Dept: RADIOLOGY | Facility: HOSPITAL | Age: 29
Discharge: HOME | End: 2024-07-08
Attending: NURSE PRACTITIONER
Payer: COMMERCIAL

## 2024-07-08 DIAGNOSIS — E11.69 TYPE 2 DIABETES MELLITUS WITH OTHER SPECIFIED COMPLICATION, WITHOUT LONG-TERM CURRENT USE OF INSULIN: ICD-10-CM

## 2024-07-08 DIAGNOSIS — E66.01 CLASS 3 SEVERE OBESITY DUE TO EXCESS CALORIES WITH SERIOUS COMORBIDITY AND BODY MASS INDEX (BMI) OF 45.0 TO 49.9 IN ADULT: ICD-10-CM

## 2024-07-08 PROCEDURE — 71046 X-RAY EXAM CHEST 2 VIEWS: CPT | Mod: TC,FY,PO

## 2024-07-08 PROCEDURE — 71046 X-RAY EXAM CHEST 2 VIEWS: CPT | Mod: 26,,, | Performed by: RADIOLOGY

## 2024-07-09 ENCOUNTER — PATIENT MESSAGE (OUTPATIENT)
Dept: BARIATRICS | Facility: CLINIC | Age: 29
End: 2024-07-09
Payer: COMMERCIAL

## 2024-07-22 ENCOUNTER — CLINICAL SUPPORT (OUTPATIENT)
Dept: BARIATRICS | Facility: CLINIC | Age: 29
End: 2024-07-22
Payer: COMMERCIAL

## 2024-07-22 ENCOUNTER — PATIENT MESSAGE (OUTPATIENT)
Dept: BARIATRICS | Facility: CLINIC | Age: 29
End: 2024-07-22

## 2024-07-22 ENCOUNTER — PATIENT MESSAGE (OUTPATIENT)
Dept: BARIATRICS | Facility: CLINIC | Age: 29
End: 2024-07-22
Payer: COMMERCIAL

## 2024-07-22 DIAGNOSIS — E66.01 MORBID OBESITY WITH BMI OF 45.0-49.9, ADULT: ICD-10-CM

## 2024-07-22 DIAGNOSIS — Z71.3 DIETARY COUNSELING: Primary | ICD-10-CM

## 2024-07-22 DIAGNOSIS — E11.69 TYPE 2 DIABETES MELLITUS WITH OTHER SPECIFIED COMPLICATION, WITHOUT LONG-TERM CURRENT USE OF INSULIN: ICD-10-CM

## 2024-07-22 PROCEDURE — 97803 MED NUTRITION INDIV SUBSEQ: CPT | Mod: 95,,, | Performed by: DIETITIAN, REGISTERED

## 2024-07-22 NOTE — PROGRESS NOTES
The patient location is: home ([place])  The chief complaint leading to consultation is: morbid obesity    Visit type: audiovisual    Face to Face time with patient: 25 min  30 minutes of total time spent on the encounter, which includes face to face time and non-face to face time preparing to see the patient (eg, review of tests), Obtaining and/or reviewing separately obtained history, Documenting clinical information in the electronic or other health record, Independently interpreting results (not separately reported) and communicating results to the patient/family/caregiver, or Care coordination (not separately reported).         Each patient to whom he or she provides medical services by telemedicine is:  (1) informed of the relationship between the physician and patient and the respective role of any other health care provider with respect to management of the patient; and (2) notified that he or she may decline to receive medical services by telemedicine and may withdraw from such care at any time.      NUTRITIONAL Note     Referring Physician: Yue Betancourt M.D.   Reason for MNT Referral: Follow up assessment for laparoscopic Prasanth-en-Y work-up     28 y.o. female presents for follow up with self reported 2 lbs weight loss over the past month by making some dietary and lifestyle changes in preparation for surgery jailyn cutting back on sugary beverages. She is still on Ozempic but sometimes she skips a week d/t fear of nausea for 3-4 days.          Past Medical History:   Diagnosis Date    Diabetes mellitus, type 2      Morbid obesity with BMI of 40.0-44.9, adult      Obesity        CLINICAL DATA:  28 y.o.-year-old Black or  female.  Height: 5 ft.  Weight: 236 lbs  IBW: 126 lbs  BMI: 46     DAILY NUTRITIONAL NEEDS: pre-op nutritional bariatric guidelines to promote weight loss  [id number] Calories    Grams Protein     NUTRITION & HEALTH HISTORY:  Greatest challenge: dining out frequency, starchy  CHO, and irregular meal patterns     Current diet recall:      B: 2 eggs and noyola OR coffee frappe  L: no appetite  Sn: sunflower seeds, crackers  D: chicken breast, rice and gravy     Current Diet:  Meal pattern: Irregular (jailyn since taking Ozempic)  Protein supplements: none  Snacking: sunflower seeds, crackers, granola bars  Vegetables: Likes a few. Eats 2-3 times/week  Fruits: Likes a variety. Eats rarely.  Beverages: water, CL, and coffee with sugar.   Dining out: Daily. Weekly. Mostly fast food, restaurants, and take-out. Subway sandwiches, [employer], soup from restaurant, pizza, Al's breakfast  Cooking at home: Reduced lately, jailyn since taking Ozempic and not wanting to eat leftovers. Chicken breast, rice and gravy.      Exercise:  None     Restrictions to exercise: time management, none, foot pain from an old injury     Vitamins / Minerals / Herbs:   Mv gummies     Labs:   None available at time of visit     Food Allergies:   None known     Social:  Works from home  Lives alone.  Grocery shopping and food prep rarely done.  Patient believes her mom and sister will be supportive after surgery.  Alcohol: Socially.  Smoking: None.     ASSESSMENT:  Patient demonstrated knowledge of healthy eating behaviors and exercise patterns; admits to not eating healthy and not exercising at this point.  Patient demonstrates willingness to change lifestyle and make behavior modifications.           Barriers to Education: none     Stage of change: determination/action     NUTRITION DIAGNOSIS:    Morbid Obesity related to Excessive carbohydrate intake, Excessive calorie intake, and Physical inactivity as evidence by BMI.     BARIATRIC DIET DISCUSSION/PLAN:  Discussed diet after surgery and related to patient's food record.  Reviewed nutrition guidelines for before and after surgery.  Answered all questions.  Continue to review Bariatric Nutrition Guidebook at home and call with any questions.  Work on Bariatric Nutrition  Checklist.  Work on expanding variety of vegetables.  Work on gradually cutting back on starchy CHO in the diet.  Begin trying various protein supplements to determine preference.  1200-calorie diet.  1500-calorie diet.  5-6 small meals per day. Try not to skip meals. Sip protein shake if no appetite.  Start including protein supplements in the diet plan daily.  Increase exercise.     RECOMMENDATIONS:  Pt is a potential candidate for bariatric surgery - Bypass.     Follow up in one month.  Needs additional visits with RD to work on dietary and lifestyle changes in preparation for bariatric surgery.     Patient verbalized understanding.        Communicated nutrition plan with bariatric team.     SESSION TIME:  30 minutes

## 2024-07-24 ENCOUNTER — CLINICAL SUPPORT (OUTPATIENT)
Dept: PSYCHIATRY | Facility: CLINIC | Age: 29
End: 2024-07-24
Payer: COMMERCIAL

## 2024-07-24 DIAGNOSIS — Z00.8 ENCOUNTER FOR PRE-SURGICAL PSYCHOLOGICAL ASSESSMENT: Primary | ICD-10-CM

## 2024-07-24 NOTE — PROGRESS NOTES
Established Patient - Audio Only Telehealth Visit     The patient location is: Home  The chief complaint leading to consultation is: Psychological testing  Visit type: Virtual visit with audio only (telephone)  Total time spent with patient: 1 hour       The reason for the audio only service rather than synchronous audio and video virtual visit was related to technical difficulties or patient preference/necessity.     The patient arrived on time for their scheduled testing appointment. Testing codes and duration will be included in the psychologist's progress note.                        This service was not originating from a related E/M service provided within the previous 7 days nor will  to an E/M service or procedure within the next 24 hours or my soonest available appointment.  Prevailing standard of care was able to be met in this audio-only visit.

## 2024-07-26 ENCOUNTER — OFFICE VISIT (OUTPATIENT)
Dept: PSYCHIATRY | Facility: CLINIC | Age: 29
End: 2024-07-26
Payer: COMMERCIAL

## 2024-07-26 DIAGNOSIS — Z71.89 ENCOUNTER FOR PSYCHOLOGICAL ASSESSMENT PRIOR TO BARIATRIC SURGERY: Primary | ICD-10-CM

## 2024-07-26 DIAGNOSIS — E11.69 TYPE 2 DIABETES MELLITUS WITH OTHER SPECIFIED COMPLICATION, WITHOUT LONG-TERM CURRENT USE OF INSULIN: ICD-10-CM

## 2024-07-26 DIAGNOSIS — E66.01 CLASS 3 SEVERE OBESITY DUE TO EXCESS CALORIES WITH SERIOUS COMORBIDITY AND BODY MASS INDEX (BMI) OF 45.0 TO 49.9 IN ADULT: ICD-10-CM

## 2024-07-26 NOTE — PROGRESS NOTES
PRESURGICAL PSYCHOLOGICAL EVALUATION - BARIATRICS   Psychiatry Initial Visit (PhD/PsyD)    Psychological Intake and Assessment      Site:   The patient location is: home ([place])  The chief complaint leading to consultation is: presurgical eval    Visit type: audiovisual    Face to Face time with patient: 34  45 minutes of total time spent on the encounter, which includes face to face time and non-face to face time preparing to see the patient (eg, review of tests), Obtaining and/or reviewing separately obtained history, Documenting clinical information in the electronic or other health record, Independently interpreting results (not separately reported) and communicating results to the patient/family/caregiver, or Care coordination (not separately reported).         Each patient to whom he or she provides medical services by telemedicine is:  (1) informed of the relationship between the physician and patient and the respective role of any other health care provider with respect to management of the patient; and (2) notified that he or she may decline to receive medical services by telemedicine and may withdraw from such care at any time.    Notes:      CPT Codes:   76872 (1 hour): Psychiatric Diagnostic Evaluation   33344 (1 hour): Integration of patient data, interpretation of standardized test results and clinical data, clinical decision-making, treatment planning and report, and interactive feedback to the patient   37372 (1 hour): Psychological or neuropsychological test administration, with single automated instrument via electronic platform, with automated result only: ?[place] Multiphasic Personality Inventory -?3  (MMPI-3)         NAME:  Karen Paredes    MRN: [id number]      :  1995     Date:  2024         Referral source: Yue Betancourt M.D.      Clinical status of patient: Outpatient      Met With: Patient      Chief complaint/reason for encounter: Routine psychological evaluation prior to  bariatric surgery.       Before this evaluation was initiated, the purposes and process of the assessment and the limits of confidentiality were discussed with the patient who expressed understanding of these issues and verbally consented to proceed with the evaluation.      Type of surgery sought:  RNY Bypass        History of present illness:    Ms. Karen Paredes is a 28-year-old Black or  female who is pursuing bariatric surgery to improve her health and quality of life. She has no history of significant psychological difficulties. She has never taken psychotropic medication, has never been hospitalized for psychiatric reasons and denied any history of suicidality.  She has begun making positive lifestyle changes in anticipation for surgery, with good benefit. The patient has a Body Mass Index of 45.9 as documented by the referring provider.      Ms. Paredes has struggled with weight for the past 2-3 years. Factors that have contributed to her weight gain over the years include sedentary job switch, overeating.  She denied a history of emotional eating. She has tried many weight loss methods on her own (i.e., Ozempic, personal training, dieting and meal prepping) with little success, and she believes that her biggest weight loss challenge is finding something that she can be consistent with.  Her motivation for seeking surgery now is to be healthier.      Ms. Paredes has met with Ms. Claire RD, bariatric dietician, and reports that she?demonstrated knowledge of healthy eating behaviors and exercise patterns?but?admits to not eating healthy and not exercising at this point. She must continue meeting with Ms. Rangel to demonstrate the implementation of lifestyle changes prior to clearance for bariatric surgery.     Co-morbidities:  diabetes mellitus type 2 insulin dependent with out complications      Knowledge of surgery information:   - Basics of procedure: they cut my stomach to the size of an  egg and reroute my intestines   - Risks: leakage, dehydration   - Basics of diet: small portions, high protein, liquids only for two weeks before and after      Current Psychiatric Symptoms:    Depression - Denied depressed mood, loss of interest in pleasurable activities, anhedonia, sleep changes, decreased motivation, decreased concentration, feelings of excessive or irrational guilt, helplessness, hopelessness, increased or decreased appetite, weight changes, increased or decreased motor activity, decreased energy,?suicidal ideations/thoughts of death.   Rebecca/Hypomania - Denied increased goal directed activity,?decreased need for sleep, pressured speech or increased talkative,?racing thoughts,?increased risk-taking behavior,?episodic elevated or irritable mood,?flight of ideas, distractibility, inflated self-esteem, grandiosity   Anxiety - Denied excessive worry, difficulty controlling worrying, feeling keyed up, easily fatigued, difficulty concentrating or mind going blank, irritability, muscle tension, sleep disturbance, racing thoughts, being unable to relax, specific phobia.   Panic Attacks: Denied palpitations, sweating, trembling, dyspnea, choking sensation, chest pain/discomfort, nausea, dizziness, chills or hot flashes, tingling, derealization, fear of losing control, fear of dying.   Thoughts - Denied any AVH, paranoia, delusions, ideas of reference, thought insertion or thought broadcasting   Suicidal thoughts/behaviors - denied passive or active SI, denied suicidal plans or intent   Self-injury - denied.   Substance abuse - denied abuse or dependence.    Sleep - Denied increased sleep latency, frequent nighttime awakenings, or early morning awakening with inability to return to sleep.      Current psychiatric treatment:   Medications: Denied   Psychotherapy: Denied     Current Health Behaviors:   Compliant with medical regimens and appointments: Yes  Prescription medication misuse: No   Exercise:  Yes - walks a few times a week  Adequate cognitive functioning: Yes     Past Psychiatric history:    Previous diagnosis:   Reports depression at the end of college due to not knowing what she would do after graduation.  Previous psychiatric hospitalizations/inpatient treatment: none   History of outpatient treatment: [facility] therapy sessions  Previous suicide attempt: none   Non-suicidal self-injury: none      Trauma history:   Denies.      PTSD: Denies re-experiencing trauma, nightmares, increased awareness of surroundings, hyperexcitability.      History of eating disorders:   History of bulimia: Denies recurrent episodes of eating then engaging in inappropriate compensatory behaviors. ?        History of binge-eating episodes: Denies eating excessive amounts of food within a discrete time period with a lack of control during eating. ?She denied eating more rapidly than normal, eating until uncomfortably full, eating large amounts of food when not physically hungry, eating alone due to embarrassment, or negative emotions (i.e., disgusted, guilty, depressed) afterwards.      Family history of psychiatric illness: None reported.       Social history (marriage, employment, etc.): Ms. Paredes was born and raised in [place], [place].  She described her childhood as average.  She denied childhood trauma, abuse, and neglect. She has a bachelor's degree in criminal justice.  She is currently employed at a [employer] center for [employer].  She is not on disability and finances are stable. She is single and has no children.  She currently lives alone.       Current psychosocial stressors:  denies     Report of coping skills/recreational activities:  take a nap, go for a walk, get nails done     Support system:  family     Substance use:    Alcohol:   2-3 drinks a month  Drugs: Denied current use; denied history of abuse or dependency.   Tobacco: None.    Caffeine:   one cup of coffee 3 times a week     Current medications and  drug reactions (include OTC, herbal): see medication list       PSYCHOLOGICAL ASSESSMENT/TESTING:    All tests were administered according to standardized procedures and were selected based on the reason for referral.   The MMPI-3 provides an assessment of personality and psychopathology with specific evaluation of psychosocial risk factors associated with outcomes of bariatric surgery.    Ms. Paredes produced a valid and interpretable MMPI-3 protocol. Her test results should be considered a reasonably accurate reflection of her current psychological functioning.    TEST RESULTS. Ms. Paredes scores do not indicate any somatic, cognitive, emotional, thought, interpersonal, or behavioral dysfunction. In fact, her profile suggests she has an above average level of emotional and psychological wellbeing. There are no specific treatment recommendations indicated by her profile.   GROUP COMPARISONS. Compared to other bariatric surgery candidates, Ms. Paredes reports lower levels of stress.        FEEDBACK. Ms. Paredes was provided with test results, and offered the opportunity to respond to feedback and clarify results if needed. She acknowledged the results as being largely consistent with her overall functioning and well-being.         Mental Status Exam:    General Appearance:   age appropriate, well dressed, neatly groomed, overweight     Speech:   normal tone, normal rate, normal pitch, normal volume     Level of Cooperation:   cooperative     Thought Processes:   normal and logical     Mood:   euthymic     Thought Content:   normal, no suicidality, no homicidality, delusions, or paranoia     Affect:   congruent and appropriate     Orientation:   oriented x3     Memory:   recent memory intact;   remote memory intact; able to recall remote personal events    Attention Span & Concentration:   appropriate    Fund of General Knowledge:   appropriate for education     Abstract Reasoning:   Not directly assessed    Judgment &  Insight:   good     Language   intact           SUMMARY AND RECOMMENDATIONS:   Ms. Karen Paredes is a 28-year-old female referred for presurgical psychological evaluation prior to bariatric surgery. Results of personality testing should be considered valid, and they indicate that she is experiencing no acute psychiatric symptoms or declines in functioning at this time. Test results do not reveal any evidence that psychological difficulties would play a role in her recovery from surgery. Ms. Paredes's testing profile was largely consistent with her reports in the clinical interview. In the clinical interview, Ms. Paredes denied past psychiatric history and treatment.    There are no overt psychological contraindications for proceeding with bariatric surgery. Overall, Ms. Paredes is at low risk for adverse postsurgical outcomes based on the following considerations:   There are no indications of disabling psychopathology, substance use/abuse, cognitive problems, or disabilities that would prevent understanding and competence with medical treatment.  There are no reports or major psychosocial stressors that would interfere with her adherence to treatment recommendations.  There is no evidence of suicidality.   She exhibits medium social stability and good social support.  She has adequate coping strategies to deal with stress and the demands of surgery and recovery.   She has good knowledge about the surgical procedure, good knowledge about the required dietary and lifestyle changes, and adequate understanding of possible risks of this treatment option. She reports adequate compliance with prior medical regimens.   There are no recommendations for psychological intervention at this time. This patient is a suitable candidate to proceed with bariatric surgery.    Diagnosis:     ICD-10-CM ICD-9-CM   1. Encounter for psychological assessment prior to bariatric surgery  Z71.89 V65.49   2. Class 3 severe obesity due to excess  calories with serious comorbidity and body mass index (BMI) of 45.0 to 49.9 in adult  E66.01 278.01    Z68.42 V85.42   3. Type 2 diabetes mellitus with other specified complication, without long-term current use of insulin  E11.69 250.80       Plan: This report will be sent to the referring provider with impressions and recommendations. It will be the referring team's decision whether the patient proceeds with surgery. Services related to the presurgical psychological evaluation are now concluded.       Evaluation Length (direct face-to-face time): 34  Total Time including report writing, test scoring, chart review, integration of data and feedback: 70          Fatmata Amato, PhD  Licensed Clinical Psychologist ([id number])  [facility]

## 2024-08-02 ENCOUNTER — HOSPITAL ENCOUNTER (OUTPATIENT)
Dept: CARDIOLOGY | Facility: CLINIC | Age: 29
Discharge: HOME | End: 2024-08-02
Payer: COMMERCIAL

## 2024-08-02 ENCOUNTER — PATIENT MESSAGE (OUTPATIENT)
Dept: BARIATRICS | Facility: CLINIC | Age: 29
End: 2024-08-02
Payer: COMMERCIAL

## 2024-08-02 ENCOUNTER — LAB VISIT (OUTPATIENT)
Dept: LAB | Facility: HOSPITAL | Age: 29
End: 2024-08-02
Payer: COMMERCIAL

## 2024-08-02 ENCOUNTER — TELEPHONE (OUTPATIENT)
Dept: BARIATRICS | Facility: CLINIC | Age: 29
End: 2024-08-02
Payer: COMMERCIAL

## 2024-08-02 DIAGNOSIS — E11.69 TYPE 2 DIABETES MELLITUS WITH OTHER SPECIFIED COMPLICATION, WITHOUT LONG-TERM CURRENT USE OF INSULIN: ICD-10-CM

## 2024-08-02 DIAGNOSIS — E66.01 CLASS 3 SEVERE OBESITY DUE TO EXCESS CALORIES WITH SERIOUS COMORBIDITY AND BODY MASS INDEX (BMI) OF 45.0 TO 49.9 IN ADULT: ICD-10-CM

## 2024-08-02 LAB
25(OH)D3+25(OH)D2 SERPL-MCNC: 13 NG/ML (ref 30–96)
ALBUMIN SERPL BCP-MCNC: 3.8 G/DL (ref 3.5–5.2)
ALP SERPL-CCNC: 56 U/L (ref 55–135)
ALT SERPL W/O P-5'-P-CCNC: 9 U/L (ref 10–44)
ANION GAP SERPL CALC-SCNC: 8 MMOL/L (ref 8–16)
AST SERPL-CCNC: 13 U/L (ref 10–40)
BASOPHILS # BLD AUTO: 0.03 K/UL (ref 0–0.2)
BASOPHILS NFR BLD: 0.5 % (ref 0–1.9)
BILIRUB DIRECT SERPL-MCNC: 0.2 MG/DL (ref 0.1–0.3)
BILIRUB SERPL-MCNC: 0.5 MG/DL (ref 0.1–1)
BUN SERPL-MCNC: 7 MG/DL (ref 6–20)
CALCIUM SERPL-MCNC: 9 MG/DL (ref 8.7–10.5)
CHLORIDE SERPL-SCNC: 105 MMOL/L (ref 95–110)
CHOLEST SERPL-MCNC: 130 MG/DL (ref 120–199)
CHOLEST/HDLC SERPL: 3.7 {RATIO} (ref 2–5)
CO2 SERPL-SCNC: 24 MMOL/L (ref 23–29)
CREAT SERPL-MCNC: 0.8 MG/DL (ref 0.5–1.4)
DIFFERENTIAL METHOD BLD: ABNORMAL
EOSINOPHIL # BLD AUTO: 0.2 K/UL (ref 0–0.5)
EOSINOPHIL NFR BLD: 3.2 % (ref 0–8)
ERYTHROCYTE [DISTWIDTH] IN BLOOD BY AUTOMATED COUNT: 15 % (ref 11.5–14.5)
EST. GFR  (NO RACE VARIABLE): >60 ML/MIN/1.73 M^2
ESTIMATED AVG GLUCOSE: 131 MG/DL (ref 68–131)
FOLATE SERPL-MCNC: 10.3 NG/ML (ref 4–24)
GLUCOSE SERPL-MCNC: 92 MG/DL (ref 70–110)
HBA1C MFR BLD: 6.2 % (ref 4–5.6)
HCT VFR BLD AUTO: 36.5 % (ref 37–48.5)
HDLC SERPL-MCNC: 35 MG/DL (ref 40–75)
HDLC SERPL: 26.9 % (ref 20–50)
HGB BLD-MCNC: 11.7 G/DL (ref 12–16)
IMM GRANULOCYTES # BLD AUTO: 0.02 K/UL (ref 0–0.04)
IMM GRANULOCYTES NFR BLD AUTO: 0.3 % (ref 0–0.5)
IRON SERPL-MCNC: 71 UG/DL (ref 30–160)
LDLC SERPL CALC-MCNC: 82.6 MG/DL (ref 63–159)
LYMPHOCYTES # BLD AUTO: 1.9 K/UL (ref 1–4.8)
LYMPHOCYTES NFR BLD: 31.1 % (ref 18–48)
MAGNESIUM SERPL-MCNC: 1.7 MG/DL (ref 1.6–2.6)
MCH RBC QN AUTO: 26 PG (ref 27–31)
MCHC RBC AUTO-ENTMCNC: 32.1 G/DL (ref 32–36)
MCV RBC AUTO: 81 FL (ref 82–98)
MONOCYTES # BLD AUTO: 0.7 K/UL (ref 0.3–1)
MONOCYTES NFR BLD: 11.1 % (ref 4–15)
NEUTROPHILS # BLD AUTO: 3.2 K/UL (ref 1.8–7.7)
NEUTROPHILS NFR BLD: 53.8 % (ref 38–73)
NONHDLC SERPL-MCNC: 95 MG/DL
NRBC BLD-RTO: 0 /100 WBC
OHS QRS DURATION: 72 MS
OHS QTC CALCULATION: 402 MS
PHOSPHATE SERPL-MCNC: 3.1 MG/DL (ref 2.7–4.5)
PLATELET # BLD AUTO: 316 K/UL (ref 150–450)
PMV BLD AUTO: 11.2 FL (ref 9.2–12.9)
POTASSIUM SERPL-SCNC: 3.8 MMOL/L (ref 3.5–5.1)
PROT SERPL-MCNC: 7.5 G/DL (ref 6–8.4)
RBC # BLD AUTO: 4.5 M/UL (ref 4–5.4)
SATURATED IRON: 16 % (ref 20–50)
SODIUM SERPL-SCNC: 137 MMOL/L (ref 136–145)
T3 SERPL-MCNC: 120 NG/DL (ref 60–180)
T4 FREE SERPL-MCNC: 1.03 NG/DL (ref 0.71–1.51)
T4 SERPL-MCNC: 9.6 UG/DL (ref 4.5–11.5)
TOTAL IRON BINDING CAPACITY: 438 UG/DL (ref 250–450)
TRANSFERRIN SERPL-MCNC: 296 MG/DL (ref 200–375)
TRIGL SERPL-MCNC: 62 MG/DL (ref 30–150)
TSH SERPL DL<=0.005 MIU/L-ACNC: 1.31 UIU/ML (ref 0.4–4)
VIT B12 SERPL-MCNC: 359 PG/ML (ref 210–950)
WBC # BLD AUTO: 5.94 K/UL (ref 3.9–12.7)

## 2024-08-02 PROCEDURE — 93005 ELECTROCARDIOGRAM TRACING: CPT | Mod: S$GLB,,, | Performed by: NURSE PRACTITIONER

## 2024-08-02 PROCEDURE — 80061 LIPID PANEL: CPT | Performed by: NURSE PRACTITIONER

## 2024-08-02 PROCEDURE — 83540 ASSAY OF IRON: CPT | Performed by: NURSE PRACTITIONER

## 2024-08-02 PROCEDURE — 84480 ASSAY TRIIODOTHYRONINE (T3): CPT | Performed by: NURSE PRACTITIONER

## 2024-08-02 PROCEDURE — 84436 ASSAY OF TOTAL THYROXINE: CPT | Performed by: NURSE PRACTITIONER

## 2024-08-02 PROCEDURE — 82306 VITAMIN D 25 HYDROXY: CPT | Performed by: NURSE PRACTITIONER

## 2024-08-02 PROCEDURE — 80048 BASIC METABOLIC PNL TOTAL CA: CPT | Performed by: NURSE PRACTITIONER

## 2024-08-02 PROCEDURE — 36415 COLL VENOUS BLD VENIPUNCTURE: CPT | Performed by: NURSE PRACTITIONER

## 2024-08-02 PROCEDURE — 86677 HELICOBACTER PYLORI ANTIBODY: CPT | Performed by: NURSE PRACTITIONER

## 2024-08-02 PROCEDURE — 84100 ASSAY OF PHOSPHORUS: CPT | Performed by: NURSE PRACTITIONER

## 2024-08-02 PROCEDURE — 83036 HEMOGLOBIN GLYCOSYLATED A1C: CPT | Performed by: NURSE PRACTITIONER

## 2024-08-02 PROCEDURE — 93010 ELECTROCARDIOGRAM REPORT: CPT | Mod: S$GLB,,, | Performed by: INTERNAL MEDICINE

## 2024-08-02 PROCEDURE — 80076 HEPATIC FUNCTION PANEL: CPT | Performed by: NURSE PRACTITIONER

## 2024-08-02 PROCEDURE — 84425 ASSAY OF VITAMIN B-1: CPT | Performed by: NURSE PRACTITIONER

## 2024-08-02 PROCEDURE — 85025 COMPLETE CBC W/AUTO DIFF WBC: CPT | Performed by: NURSE PRACTITIONER

## 2024-08-02 PROCEDURE — 84443 ASSAY THYROID STIM HORMONE: CPT | Performed by: NURSE PRACTITIONER

## 2024-08-02 PROCEDURE — 83735 ASSAY OF MAGNESIUM: CPT | Performed by: NURSE PRACTITIONER

## 2024-08-02 PROCEDURE — 84439 ASSAY OF FREE THYROXINE: CPT | Performed by: NURSE PRACTITIONER

## 2024-08-02 PROCEDURE — 82607 VITAMIN B-12: CPT | Performed by: NURSE PRACTITIONER

## 2024-08-02 PROCEDURE — 82746 ASSAY OF FOLIC ACID SERUM: CPT | Performed by: NURSE PRACTITIONER

## 2024-08-02 NOTE — TELEPHONE ENCOUNTER
Returned pt's call in regard to rescheduling pre op testing. Pt's [place] upper GI has been rescheduled to the requested date. Pt verbalized understanding of new appt time/date. Call was disconnected.

## 2024-08-02 NOTE — TELEPHONE ENCOUNTER
----- Message from Lucie Field sent at 8/2/2024 12:36 PM CDT -----  Regarding: Reschedule Appt  Contact: [phone]  Hi, provider ordered  Bariatric Surgery testing and missed the appt last week. Pt is asking for a call to reschedule. Pls call [phone] to discuss.    Thank you.

## 2024-08-05 ENCOUNTER — HOSPITAL ENCOUNTER (OUTPATIENT)
Dept: RADIOLOGY | Facility: HOSPITAL | Age: 29
Discharge: HOME | End: 2024-08-05
Attending: NURSE PRACTITIONER
Payer: COMMERCIAL

## 2024-08-05 DIAGNOSIS — E66.01 CLASS 3 SEVERE OBESITY DUE TO EXCESS CALORIES WITH SERIOUS COMORBIDITY AND BODY MASS INDEX (BMI) OF 45.0 TO 49.9 IN ADULT: ICD-10-CM

## 2024-08-05 DIAGNOSIS — E11.69 TYPE 2 DIABETES MELLITUS WITH OTHER SPECIFIED COMPLICATION, WITHOUT LONG-TERM CURRENT USE OF INSULIN: ICD-10-CM

## 2024-08-05 LAB — H PYLORI IGG SERPL QL IA: NEGATIVE

## 2024-08-05 RX ORDER — ERGOCALCIFEROL 1.25 MG/1
50000 CAPSULE ORAL
Qty: 24 CAPSULE | Refills: 0 | Status: SHIPPED | OUTPATIENT
Start: 2024-08-05 | End: 2024-10-25

## 2024-08-06 LAB — VIT B1 BLD-MCNC: 40 UG/L (ref 38–122)

## 2024-08-12 ENCOUNTER — PATIENT MESSAGE (OUTPATIENT)
Dept: BARIATRICS | Facility: CLINIC | Age: 29
End: 2024-08-12
Payer: COMMERCIAL

## 2024-08-12 ENCOUNTER — HOSPITAL ENCOUNTER (OUTPATIENT)
Dept: RADIOLOGY | Facility: HOSPITAL | Age: 29
Discharge: HOME | End: 2024-08-12
Attending: NURSE PRACTITIONER
Payer: COMMERCIAL

## 2024-08-12 PROCEDURE — 74240 X-RAY XM UPR GI TRC 1CNTRST: CPT | Mod: TC,FY

## 2024-08-12 PROCEDURE — 74240 X-RAY XM UPR GI TRC 1CNTRST: CPT | Mod: 26,,, | Performed by: RADIOLOGY

## 2024-08-12 PROCEDURE — A9698 NON-RAD CONTRAST MATERIALNOC: HCPCS | Performed by: NURSE PRACTITIONER

## 2024-08-12 PROCEDURE — 25500020 PHARM REV CODE 255: Performed by: NURSE PRACTITIONER

## 2024-08-12 RX ADMIN — BARIUM SULFATE 355 ML: 0.6 SUSPENSION ORAL at 11:08

## 2024-08-12 RX ADMIN — BARIUM SULFATE 137 ML: 980 POWDER, FOR SUSPENSION ORAL at 11:08

## 2024-08-14 ENCOUNTER — TELEPHONE (OUTPATIENT)
Dept: FAMILY MEDICINE | Facility: CLINIC | Age: 29
End: 2024-08-14
Payer: COMMERCIAL

## 2024-08-16 ENCOUNTER — TELEPHONE (OUTPATIENT)
Dept: INTERNAL MEDICINE | Facility: CLINIC | Age: 29
End: 2024-08-16
Payer: COMMERCIAL

## 2024-08-16 NOTE — TELEPHONE ENCOUNTER
----- Message from Wili Ariza sent at 8/16/2024  3:13 PM CDT -----  Contact: pt  Type:  Sooner Apoointment Request    Caller is requesting a sooner appointment.  Caller declined first available appointment listed below.  Caller will not accept being placed on the waitlist and is requesting a message be sent to doctor.  Name of Caller:pt   When is the first available appointment? N/a  Symptoms:physical   Would the patient rather a call back or a response via [facility]? Call   Best Call Back Number: [phone]  Additional Information: needed by 08/24

## 2024-08-16 NOTE — TELEPHONE ENCOUNTER
Called and spoke with pt, pt requested a sooner appt due to needing a physical for a job I offered dr gabriel strickland and pt agreed to schedule with her 8/21 at 1pm

## 2024-08-19 ENCOUNTER — PATIENT MESSAGE (OUTPATIENT)
Dept: BARIATRICS | Facility: CLINIC | Age: 29
End: 2024-08-19
Payer: COMMERCIAL

## 2024-08-19 NOTE — TELEPHONE ENCOUNTER
"Dr. Betancourt,    Pt is requesting to change surgery from bypass to sleeve.  Her [place] Upper GI result:  "Gastroesophageal reflux: None observed."    Please let us know if there is any contraindication to the sleeve.    Thanks,  Annette BUTTS       "

## 2024-08-21 ENCOUNTER — TELEPHONE (OUTPATIENT)
Dept: BARIATRICS | Facility: CLINIC | Age: 29
End: 2024-08-21
Payer: COMMERCIAL

## 2024-08-21 ENCOUNTER — LAB VISIT (OUTPATIENT)
Dept: LAB | Facility: HOSPITAL | Age: 29
End: 2024-08-21
Payer: COMMERCIAL

## 2024-08-21 ENCOUNTER — OFFICE VISIT (OUTPATIENT)
Dept: FAMILY MEDICINE | Facility: CLINIC | Age: 29
End: 2024-08-21
Payer: COMMERCIAL

## 2024-08-21 VITALS
SYSTOLIC BLOOD PRESSURE: 130 MMHG | BODY MASS INDEX: 48.17 KG/M2 | WEIGHT: 245.38 LBS | DIASTOLIC BLOOD PRESSURE: 80 MMHG | HEIGHT: 60 IN | HEART RATE: 89 BPM | OXYGEN SATURATION: 100 %

## 2024-08-21 DIAGNOSIS — E11.69 TYPE 2 DIABETES MELLITUS WITH OTHER SPECIFIED COMPLICATION, WITHOUT LONG-TERM CURRENT USE OF INSULIN: ICD-10-CM

## 2024-08-21 DIAGNOSIS — Z00.00 ANNUAL PHYSICAL EXAM: ICD-10-CM

## 2024-08-21 DIAGNOSIS — E66.01 CLASS 3 SEVERE OBESITY DUE TO EXCESS CALORIES WITH SERIOUS COMORBIDITY AND BODY MASS INDEX (BMI) OF 45.0 TO 49.9 IN ADULT: ICD-10-CM

## 2024-08-21 DIAGNOSIS — Z00.00 ANNUAL PHYSICAL EXAM: Primary | ICD-10-CM

## 2024-08-21 DIAGNOSIS — E66.01 MORBID OBESITY WITH BMI OF 45.0-49.9, ADULT: Primary | ICD-10-CM

## 2024-08-21 LAB
BILIRUB UR QL STRIP: NEGATIVE
CLARITY UR REFRACT.AUTO: CLEAR
COLOR UR AUTO: YELLOW
GLUCOSE UR QL STRIP: NEGATIVE
HGB UR QL STRIP: NEGATIVE
KETONES UR QL STRIP: NEGATIVE
LEUKOCYTE ESTERASE UR QL STRIP: NEGATIVE
NITRITE UR QL STRIP: NEGATIVE
PH UR STRIP: 7 [PH] (ref 5–8)
PROT UR QL STRIP: NEGATIVE
SP GR UR STRIP: 1.03 (ref 1–1.03)
URN SPEC COLLECT METH UR: NORMAL

## 2024-08-21 PROCEDURE — 3075F SYST BP GE 130 - 139MM HG: CPT | Mod: CPTII,S$GLB,,

## 2024-08-21 PROCEDURE — 1160F RVW MEDS BY RX/DR IN RCRD: CPT | Mod: CPTII,S$GLB,,

## 2024-08-21 PROCEDURE — 99999 PR PBB SHADOW E&M-EST. PATIENT-LVL IV: CPT | Mod: PBBFAC,,,

## 2024-08-21 PROCEDURE — 99395 PREV VISIT EST AGE 18-39: CPT | Mod: S$GLB,,,

## 2024-08-21 PROCEDURE — 3008F BODY MASS INDEX DOCD: CPT | Mod: CPTII,S$GLB,,

## 2024-08-21 PROCEDURE — 81003 URINALYSIS AUTO W/O SCOPE: CPT

## 2024-08-21 PROCEDURE — 1159F MED LIST DOCD IN RCRD: CPT | Mod: CPTII,S$GLB,,

## 2024-08-21 PROCEDURE — 3079F DIAST BP 80-89 MM HG: CPT | Mod: CPTII,S$GLB,,

## 2024-08-21 PROCEDURE — 3044F HG A1C LEVEL LT 7.0%: CPT | Mod: CPTII,S$GLB,,

## 2024-08-21 NOTE — LETTER
[facility] - Bariatric Surg 2nd Fl  [address]  [facility] [address]  Phone: [phone]  Fax: [phone] 2024       Attn: Pre-determination Dept.    RE:  Karen Paredes          OC #: [id number]          : 1995    To Whom It May Concern:  I am sending this letter on behalf of Karen Paredes (a 28 y.o. female) for pre-approval for Bariatric Surgery; specifically the XI Robotic Prasanth-en-Y gastric bypass. Karen has a past medical history of Type II Diabetes Mellitus, and Morbid Obesity. She has made numerous attempts at dieting and exercise programs, and has failed to maintain any sustained weight loss.  Weight/Height/BMI  Estimated body mass index is 47.92 kg/m² as calculated from the following:    Height as of an earlier encounter on 24: 5' (1.524 m).    Weight as of an earlier encounter on 24: 111.3 kg (245 lb 6 oz).   Karen Paredes has been evaluated in our bariatric program by myself, the , and the program dietician and is felt to be an excellent candidate for surgery.  In addition, she has undergone a psychological evaluation, from which a letter is enclosed.  Karen Paredes has undergone extensive pre-operative education and understands all the risks, benefits, and possible complications of surgery.  She has also undergone dietary education and thorough nutritional evaluation via a registered dietician.  Our program provides long term nutritional counseling with unlimited consults with the dietician.    Our team is sending this letter to receive pre-approval for the indicated procedure.  Please let us know if you have any questions or require any further information.  Sincerely,    Yue Betancourt MD FACS  Minimally Invasive General & Bariatric Surgery  [facility] - [place], [place]

## 2024-08-21 NOTE — PROGRESS NOTES
Annual Wellness Visit  [facility]      SUBJECTIVE:     History of Present Illness:  Patient is a 28 y.o. female with history of T2DM presents to clinic for an annual wellness visit. Patient is starting a new job as . Overall, doing well. Denies any current concerns.     DM - Managed with Ozempic; Scheduled for bariatric sx soon. Unsure of date. Starting to walk for physical activity to prepare for physical exam for new job. UTD on foot exam. Sees vision works for eye exam.        Screenings   Last pap smear- 12/2021 UTD  Immunizations UTD  Last HgbA1C- 6.2  Last lipid panel- WNL   EtOH use- Socially  Sexual history- yes, nexpalon as BC     Review of Systems   Constitutional:  Negative for activity change and appetite change.   HENT:  Negative for voice change.    Respiratory:  Negative for cough, shortness of breath, wheezing and stridor.    Gastrointestinal:  Negative for abdominal pain, diarrhea, nausea and vomiting.   Genitourinary:  Negative for difficulty urinating, dysuria, frequency, urgency and vaginal discharge.   Musculoskeletal:  Negative for neck stiffness.   Skin:  Negative for color change.   Neurological:  Negative for light-headedness.   Psychiatric/Behavioral:  Negative for agitation and behavioral problems.       Review of patient's allergies indicates:  No Known Allergies    Past Medical History:   Diagnosis Date    Diabetes mellitus, type 2     Morbid obesity with BMI of 40.0-44.9, adult     Obesity      Past Surgical History:   Procedure Laterality Date    NO PAST SURGERIES      TONSILLECTOMY Bilateral 10/01/2020    Procedure: TONSILLECTOMY;  Surgeon: Meenakshi Cannon MD;  Location: [facility] OR;  Service: ENT;  Laterality: Bilateral;  RN PREOP 9/25/2020   T/S---COVID -NEGATIVE ON 9/30  UPT    TONSILLECTOMY  10/2020     Family History   Problem Relation Name Age of Onset    No Known Problems Mother      No Known Problems Father      Stroke  Maternal Grandfather Alessia Paredes     Diabetes Maternal Grandmother Mary Paredes     Breast cancer Neg Hx      Colon cancer Neg Hx      Ovarian cancer Neg Hx      Cervical cancer Neg Hx      Uterine cancer Neg Hx       Social History     Tobacco Use    Smoking status: Never     Passive exposure: Past    Smokeless tobacco: Current   Substance Use Topics    Alcohol use: Yes     Alcohol/week: 4.0 standard drinks of alcohol     Types: 2 Glasses of wine, 2 Shots of liquor per week     Comment: occ    Drug use: No          OBJECTIVE:     Vital Signs (Most Recent)  Vitals:    08/21/24 1305   BP: 130/80   Pulse:       Body mass index is 47.92 kg/m².     Physical Exam:  Physical Exam  Vitals and nursing note reviewed.   HENT:      Head: Normocephalic.      Nose: Nose normal.      Mouth/Throat:      Mouth: Mucous membranes are moist.      Pharynx: No oropharyngeal exudate.   Eyes:      General:         Right eye: No discharge.         Left eye: No discharge.      Extraocular Movements: Extraocular movements intact.      Conjunctiva/sclera: Conjunctivae normal.   Cardiovascular:      Rate and Rhythm: Normal rate and regular rhythm.      Pulses: Normal pulses.      Heart sounds: Normal heart sounds.   Pulmonary:      Effort: Pulmonary effort is normal. No respiratory distress.      Breath sounds: Normal breath sounds.   Abdominal:      Palpations: Abdomen is soft.      Tenderness: There is no abdominal tenderness.   Musculoskeletal:         General: No swelling. Normal range of motion.      Cervical back: Normal range of motion and neck supple.   Lymphadenopathy:      Cervical: No cervical adenopathy.   Skin:     General: Skin is warm.   Neurological:      Mental Status: She is alert and oriented to person, place, and time.   Psychiatric:         Mood and Affect: Mood and affect normal.         Cognition and Memory: Memory normal.         Judgment: Judgment normal.       Hemoglobin A1C   Date Value Ref Range Status    08/02/2024 6.2 (H) 4.0 - 5.6 % Final     Comment:     ADA Screening Guidelines:  5.7-6.4%  Consistent with prediabetes  >or=6.5%  Consistent with diabetes    High levels of fetal hemoglobin interfere with the HbA1C  assay. Heterozygous hemoglobin variants (HbS, HgC, etc)do  not significantly interfere with this assay.   However, presence of multiple variants may affect accuracy.     04/25/2024 6.0 (H) 4.0 - 5.6 % Final     Comment:     ADA Screening Guidelines:  5.7-6.4%  Consistent with prediabetes  >or=6.5%  Consistent with diabetes    High levels of fetal hemoglobin interfere with the HbA1C  assay. Heterozygous hemoglobin variants (HbS, HgC, etc)do  not significantly interfere with this assay.   However, presence of multiple variants may affect accuracy.     04/25/2024 6.0 (H) 4.0 - 5.6 % Final     Comment:     ADA Screening Guidelines:  5.7-6.4%  Consistent with prediabetes  >or=6.5%  Consistent with diabetes    High levels of fetal hemoglobin interfere with the HbA1C  assay. Heterozygous hemoglobin variants (HbS, HgC, etc)do  not significantly interfere with this assay.   However, presence of multiple variants may affect accuracy.                   ASSESSMENT/PLAN:   28 y.o.female presents to clinic for annual wellness visit.     Annual physical exam  -     Cancel: Urinalysis, Reflex to Urine Culture Urine, Clean Catch  -     Urinalysis, Reflex to Urine Culture Urine, Clean Catch; Future; Expected date: 08/21/2024  - RTC as scheduled with Dr. Mchugh    Type 2 diabetes mellitus with other specified complication, without long-term current use of insulin  -Chronic, stable   -Taking ozempic    -followed by bariatrics; plan to have gastric bypass sx soon.   -walking for exercise     Class 3 severe obesity due to excess calories with serious comorbidity and body mass index (BMI) of 45.0 to 49.9 in adult  -Taking ozempic    -followed by bariatrics; plan to have gastric bypass sx soon.   -walking for exercise             Follow-up: RTC as scheduled     I spent a total of 45 minutes on the day of the visit.This includes face to face time and non-face to face time preparing to see the patient (eg, review of tests), obtaining and/or reviewing separately obtained history, documenting clinical information in the electronic or other health record, independently interpreting results and communicating results to the patient/family/caregiver, or care coordinator.      Tracey Dunlap PA-C  [facility]

## 2024-08-21 NOTE — TELEPHONE ENCOUNTER
Pt called in through the phone room. She stated she decided on the Gastric Bypass.  I informed her that I will update Dr. Betancourt and her nurse coordinator.  She also stated she will be changing her insurance within the next few months and requested her surgery scheduled soon.  Quick overview shows pt completed her workup except for diet clearance scheduled for tomorrow. Insurance criteria not reviewed.  Romi Cosby RN out of office until 8/26/2024.  Messaged Caroline Noe RN with the urgency for the workup to be reviewed and scheduled ASAP if possible.

## 2024-08-22 ENCOUNTER — CLINICAL SUPPORT (OUTPATIENT)
Dept: BARIATRICS | Facility: CLINIC | Age: 29
End: 2024-08-22
Payer: COMMERCIAL

## 2024-08-22 DIAGNOSIS — Z71.3 DIETARY COUNSELING: Primary | ICD-10-CM

## 2024-08-22 DIAGNOSIS — E66.01 MORBID OBESITY WITH BMI OF 45.0-49.9, ADULT: ICD-10-CM

## 2024-08-22 DIAGNOSIS — E11.69 TYPE 2 DIABETES MELLITUS WITH OTHER SPECIFIED COMPLICATION, WITHOUT LONG-TERM CURRENT USE OF INSULIN: ICD-10-CM

## 2024-08-22 NOTE — PROGRESS NOTES
The patient location is: home ([place])  The chief complaint leading to consultation is: morbid obesity    Visit type: audiovisual    Face to Face time with patient: 30 min  30 minutes of total time spent on the encounter, which includes face to face time and non-face to face time preparing to see the patient (eg, review of tests), Obtaining and/or reviewing separately obtained history, Documenting clinical information in the electronic or other health record, Independently interpreting results (not separately reported) and communicating results to the patient/family/caregiver, or Care coordination (not separately reported).         Each patient to whom he or she provides medical services by telemedicine is:  (1) informed of the relationship between the physician and patient and the respective role of any other health care provider with respect to management of the patient; and (2) notified that he or she may decline to receive medical services by telemedicine and may withdraw from such care at any time.      NUTRITIONAL Note     Referring Physician: Yue Betancourt M.D.   Reason for MNT Referral: Follow up assessment for laparoscopic Prasanth-en-Y work-up     28 y.o. female presents for follow up with self reported 9 lbs weight gain. Admits she had a bad week (busy with work and family, did not make groceries) 2 weeks ago where she ordered out food for 2-3 meals per day, including sodas and other sugary drinks, and ice cream a few times. States she learned a lot about herself that week and that in the future she will be most successful if she keeps up with weekly meal planning and prep, jailyn if family is visiting or it is a busy week with work. She was too busy for exercise. This past week she has been back on track, drinking Premier shakes, and cooking chicken breast or salmon with fresh spinach and mashed potatoes. Snacking on sunflower seeds occ but mostly just sipping on water and the Premier shakes when hungry in b/w  meals. No longer taking Ozempic d/t fear of nausea.             Past Medical History:   Diagnosis Date    Diabetes mellitus, type 2      Morbid obesity with BMI of 40.0-44.9, adult      Obesity        CLINICAL DATA:  28 y.o.-year-old Black or  female.  Height: 5 ft.  Weight: 245 lbs  IBW: 126 lbs  BMI: 47.9     DAILY NUTRITIONAL NEEDS: pre-op nutritional bariatric guidelines to promote weight loss  1899-4709 Calories    Grams Protein     NUTRITION & HEALTH HISTORY:  Greatest challenge: dining out frequency, starchy CHO, and irregular meal patterns     Current diet recall:      B: 2 eggs and noyola   L: Premier shake  Sn: sunflower seeds  D: chicken breast/salmon, spinach and mashed potatoes     Current Diet:  Meal pattern: 1-2 meals + 1-2 protein supplements  Protein supplements: Premier shakes  Snacking: sunflower seeds  Vegetables: Likes a few. Eats almost daily.  Fruits: Likes a variety. Eats rarely.  Beverages: water, CL, and coffee with sugar.   Dining out: Daily. Weekly. Mostly fast food, restaurants, and take-out. Subway sandwiches, Chick matt A, soup from restaurant, pizza, Al's breakfast  Cooking at home: Weekly, baked and sauteed, meat and fish, vegetables, starchy CHO.     Exercise:  None     Restrictions to exercise: time management, none, foot pain from an old injury     Vitamins / Minerals / Herbs:   Mv gummies  Vit D Rx 50,000IU twice weekly     Labs:   Reviewed    Vit D deficiency     Food Allergies:   None known     Social:  Works from home  Lives alone.  Grocery shopping and food prep rarely done.  Patient believes her mom and sister will be supportive after surgery.  Alcohol: Socially.  Smoking: None.     ASSESSMENT:  Patient demonstrated knowledge of healthy eating behaviors and exercise patterns.  Patient demonstrates willingness to change lifestyle and make behavior modifications AEB dietary changes, protein supplements.           Barriers to Education: none     Stage of  change: determination/action     NUTRITION DIAGNOSIS:    Morbid Obesity related to Excessive carbohydrate intake, Excessive calorie intake, and Physical inactivity as evidence by BMI.     BARIATRIC DIET DISCUSSION/PLAN:  Discussed diet after surgery and related to patient's food record.  Reviewed nutrition guidelines for before and after surgery.  Answered all questions.  Continue to review Bariatric Nutrition Guidebook at home and call with any questions.  Work on Bariatric Nutrition Checklist.  Work on expanding variety of vegetables.  Continue cutting back on starchy CHO in the diet.  1200-calorie diet.  1500-calorie diet.  Increase exercise. Plans to joing a walking club that meets twice/week.     RECOMMENDATIONS:  Pt is a good candidate for bariatric surgery - Bypass.     Patient verbalized understanding.     Will f/u before/after surgery as needed.     Communicated nutrition plan with bariatric team.     SESSION TIME:  30 minutes

## 2024-08-22 NOTE — TELEPHONE ENCOUNTER
Spoke with patient and confirmed the surgical procedure of Robotic Bypass with Dr Betancourt on 8/22/24.  Scheduled preop appts/surgery date/2 week and 8 week post op appts. All dates and times agreed upon. Pt aware that if they are required to have PCP clearance, it must be within 6 months of surgery, unless their medical history has changed, it should be dated, signed and in chart for preop appointment. All medications have been reviewed regarding the necessity to be crushed or broken into pieces smaller that the tip of a pencil eraser for 2 weeks following gastric sleeve surgery and 4 weeks following gastric bypass surgery. Pt instructed to stop taking all NSAIDS 1 week before surgery and for life after surgery. Pt aware that protein liquid diet start date is 9/13/24. Patient is doing well with their diet. Patient was instructed about the progression of the diet phases. The patient's current weight is , height is 245 lbs, and BMI is 47.92. Refer to medical letter of necessity from Surgeon. Discussed the importance of increased physical activity and dieting lifestyle changes to improve weight loss and meet goals. Screened patient for history of UTI per protocol. Discussed with patient to avoid antibiotics and elective procedures involving sedation/anesthesia within 30 days of surgery unless cleared by the bariatric department. Patient instructed to call the bariatric clinic post op for any s/s of UTI. Patient's mailing address confirmed and informed to expect a manilla envelop containing bariatric surgery pre op booklet, appointment reminders, protein and fluid log sheets, and liquid diet and vitamin information sheets. Pt aware that all appts can be seen in my [facility] patient portal at this time. Confirmed email address and informed patient that they will be enrolled in the Patient Reported Outcomes program to track their progress and successes. The first email will be sent 2-3 weeks before surgery and then every  year on your surgery anniversary date. Office phone and fax number given to patient for any future questions/concerns. Discussed the pre-surgery complex carbohydrate beverage to purchase in [facility] to drink 30 minutes before the surgery arrival time. Reviewed the policy of scheduling a covid test 72 hours prior to surgery if necessary.

## 2024-08-23 DIAGNOSIS — E11.69 TYPE 2 DIABETES MELLITUS WITH OTHER SPECIFIED COMPLICATION, WITHOUT LONG-TERM CURRENT USE OF INSULIN: ICD-10-CM

## 2024-08-23 RX ORDER — SEMAGLUTIDE 1.34 MG/ML
1 INJECTION, SOLUTION SUBCUTANEOUS
Qty: 9 ML | Refills: 1 | Status: SHIPPED | OUTPATIENT
Start: 2024-08-23

## 2024-08-23 NOTE — TELEPHONE ENCOUNTER
No care due was identified.  [facility] Embedded Care Due Messages. Reference number: [id number].   8/23/2024 8:53:55 AM CDT

## 2024-08-23 NOTE — TELEPHONE ENCOUNTER
Refill Authorization Note     Refill Decision Note   Karen Paredes  is requesting a refill authorization.  Brief Assessment and Rationale for Refill:  Approve     Medication Therapy Plan:       Medication Reconciliation Completed: No   Comments:     No Care Gaps recommended.     Note composed:3:38 PM 08/23/2024

## 2024-09-03 ENCOUNTER — PATIENT MESSAGE (OUTPATIENT)
Dept: BARIATRICS | Facility: CLINIC | Age: 29
End: 2024-09-03
Payer: COMMERCIAL

## 2024-09-04 ENCOUNTER — PATIENT OUTREACH (OUTPATIENT)
Dept: ADMINISTRATIVE | Facility: HOSPITAL | Age: 29
End: 2024-09-04
Payer: COMMERCIAL

## 2024-09-04 NOTE — PROGRESS NOTES
Population Health Chart Review & Patient Outreach Details      Additional [facility] Notes:               Updates Requested / Reviewed:      Updated Care Coordination Note, Care Everywhere, and Immunizations Reconciliation Completed or Queried: [place]         Health Maintenance Topics Overdue:      [facility] Score: 1     Eye Exam                       Health Maintenance Topic(s) Outreach Outcomes & Actions Taken:    Eye Exam - Outreach Outcomes & Actions Taken  : Pt Will Schedule with External Provider / Order Routed & Care Team Updated if Applicable

## 2024-09-05 ENCOUNTER — TELEPHONE (OUTPATIENT)
Dept: BARIATRICS | Facility: CLINIC | Age: 29
End: 2024-09-05
Payer: COMMERCIAL

## 2024-09-05 NOTE — TELEPHONE ENCOUNTER
Spoke to patient to let her know about her recent denial letter which was based on the criteria that the number of months in diet needs to be 6 and not 3. Advised her that our initial criteria said 3 visits. (Will advise Supervisor, WON Kumar of incorrect criteria). Patient stated that she wants to continue and finish her 6 months. Next diet visit scheduled.    LVM with insurance company regarding advisement on next steps, given the wrong criteria. Will keep patient informed as to what her insurance company requires to move forward.

## 2024-09-06 ENCOUNTER — CLINICAL SUPPORT (OUTPATIENT)
Dept: BARIATRICS | Facility: CLINIC | Age: 29
End: 2024-09-06
Payer: COMMERCIAL

## 2024-09-06 DIAGNOSIS — E11.69 TYPE 2 DIABETES MELLITUS WITH OTHER SPECIFIED COMPLICATION, WITHOUT LONG-TERM CURRENT USE OF INSULIN: ICD-10-CM

## 2024-09-06 DIAGNOSIS — E66.01 MORBID OBESITY WITH BMI OF 45.0-49.9, ADULT: ICD-10-CM

## 2024-09-06 DIAGNOSIS — Z71.3 DIETARY COUNSELING: Primary | ICD-10-CM

## 2024-09-06 NOTE — PROGRESS NOTES
The patient location is: home ([place])  The chief complaint leading to consultation is: morbid obesity    Visit type: audiovisual    Face to Face time with patient: 15 min  15 minutes of total time spent on the encounter, which includes face to face time and non-face to face time preparing to see the patient (eg, review of tests), Obtaining and/or reviewing separately obtained history, Documenting clinical information in the electronic or other health record, Independently interpreting results (not separately reported) and communicating results to the patient/family/caregiver, or Care coordination (not separately reported).         Each patient to whom he or she provides medical services by telemedicine is:  (1) informed of the relationship between the physician and patient and the respective role of any other health care provider with respect to management of the patient; and (2) notified that he or she may decline to receive medical services by telemedicine and may withdraw from such care at any time.      NUTRITIONAL Note     Referring Physician: Yue Betancourt M.D.   Reason for MNT Referral: Follow up assessment for laparoscopic Prasanth-en-Y work-up     29 y.o. female presents for follow up with no weight to report over the past few weeks. Admits she splurged most of the weekend for her birthday. Plans to do some meal planning and prep for the week ahead.              Past Medical History:   Diagnosis Date    Diabetes mellitus, type 2      Morbid obesity with BMI of 40.0-44.9, adult      Obesity        CLINICAL DATA:  29 y.o.-year-old Black or  female.  Height: 5 ft.  Weight: 245 lbs  IBW: 126 lbs  BMI: 47.9     DAILY NUTRITIONAL NEEDS: pre-op nutritional bariatric guidelines to promote weight loss  2550-0410 Calories    Grams Protein     NUTRITION & HEALTH HISTORY:  Greatest challenge: dining out frequency, starchy CHO, and irregular meal patterns     Usual Diet:  Meal pattern: 1-2 meals + 1-2  protein supplements  Protein supplements: Premier shakes  Snacking: sunflower seeds  Vegetables: Likes a few. Eats almost daily.  Fruits: Likes a variety. Eats rarely.  Beverages: water, CL, and coffee with sugar.   Dining out: Daily. Weekly. Mostly fast food, restaurants, and take-out. Subway sandwiches, Chick matt A, soup from restaurant, pizza, Al's breakfast  Cooking at home: Weekly, baked and sauteed, meat and fish, vegetables, starchy CHO.     Exercise:  None     Restrictions to exercise: time management, none, foot pain from an old injury     Vitamins / Minerals / Herbs:   Mv gummies  Vit D Rx 50,000IU twice weekly     Labs:   Reviewed     Vit D deficiency     Food Allergies:   None known     Social:  Works from home  Lives alone.  Grocery shopping and food prep rarely done.  Patient believes her mom and sister will be supportive after surgery.  Alcohol: Socially.  Smoking: None.     ASSESSMENT:  Patient demonstrated knowledge of healthy eating behaviors and exercise patterns.  Patient states willingness to change lifestyle and make behavior modifications.           Barriers to Education: none     Stage of change: determination     NUTRITION DIAGNOSIS:    Morbid Obesity related to Excessive carbohydrate intake, Excessive calorie intake, and Physical inactivity as evidence by BMI.     BARIATRIC DIET DISCUSSION/PLAN:  Discussed diet after surgery and related to patient's food record.  Reviewed nutrition guidelines for before and after surgery.  Answered all questions.  Continue to review Bariatric Nutrition Guidebook at home and call with any questions.  Work on Bariatric Nutrition Checklist.  Work on expanding variety of vegetables.  Continue cutting back on starchy CHO in the diet.  1200-calorie diet.  1500-calorie diet.  Increase exercise.     Plan for next week:  B: 2 eggs and noyola   L: Premier shake  Sn: sunflower seeds  D: salmon and shrimp with vegetables/salads     RECOMMENDATIONS:  Pt is a potential  candidate for bariatric surgery - Gastric Bypass.  Follow up in one month.    Patient verbalized understanding.     Communicated nutrition plan with bariatric team.     SESSION TIME:  15 minutes

## 2024-09-09 ENCOUNTER — TELEPHONE (OUTPATIENT)
Dept: BARIATRICS | Facility: CLINIC | Age: 29
End: 2024-09-09
Payer: COMMERCIAL

## 2024-09-09 NOTE — TELEPHONE ENCOUNTER
Spoke to patient to remind her not to come in for her appointment due to her recent denial. Advised patient that I will inform her of what her insurance company says regarding her process going forward.

## 2024-09-10 ENCOUNTER — PATIENT MESSAGE (OUTPATIENT)
Dept: BARIATRICS | Facility: CLINIC | Age: 29
End: 2024-09-10
Payer: COMMERCIAL

## 2024-09-13 ENCOUNTER — DOCUMENTATION ONLY (OUTPATIENT)
Dept: BARIATRICS | Facility: CLINIC | Age: 29
End: 2024-09-13
Payer: COMMERCIAL

## 2024-09-13 NOTE — PROGRESS NOTES
Left 3rd voicemail for Katherin with [employer] [place] to see how to proceed with case. Patient was denied due to having the wrong criteria downloaded. She needs 6 diet visits instead of the 3 that the initial criteria indicated.

## 2024-09-16 ENCOUNTER — HOSPITAL ENCOUNTER (EMERGENCY)
Facility: HOSPITAL | Age: 29
Discharge: HOME | End: 2024-09-17
Attending: STUDENT IN AN ORGANIZED HEALTH CARE EDUCATION/TRAINING PROGRAM
Payer: COMMERCIAL

## 2024-09-16 DIAGNOSIS — R07.9 CHEST PAIN: ICD-10-CM

## 2024-09-16 DIAGNOSIS — K52.9 GASTROENTERITIS: Primary | ICD-10-CM

## 2024-09-16 PROCEDURE — 81025 URINE PREGNANCY TEST: CPT | Performed by: STUDENT IN AN ORGANIZED HEALTH CARE EDUCATION/TRAINING PROGRAM

## 2024-09-16 PROCEDURE — 93010 ELECTROCARDIOGRAM REPORT: CPT | Mod: ,,, | Performed by: INTERNAL MEDICINE

## 2024-09-16 PROCEDURE — 96361 HYDRATE IV INFUSION ADD-ON: CPT

## 2024-09-16 PROCEDURE — 25000003 PHARM REV CODE 250: Performed by: STUDENT IN AN ORGANIZED HEALTH CARE EDUCATION/TRAINING PROGRAM

## 2024-09-16 PROCEDURE — 93005 ELECTROCARDIOGRAM TRACING: CPT

## 2024-09-16 PROCEDURE — 96375 TX/PRO/DX INJ NEW DRUG ADDON: CPT

## 2024-09-16 PROCEDURE — 63600175 PHARM REV CODE 636 W HCPCS: Performed by: STUDENT IN AN ORGANIZED HEALTH CARE EDUCATION/TRAINING PROGRAM

## 2024-09-16 PROCEDURE — 87635 SARS-COV-2 COVID-19 AMP PRB: CPT | Performed by: STUDENT IN AN ORGANIZED HEALTH CARE EDUCATION/TRAINING PROGRAM

## 2024-09-16 PROCEDURE — 83690 ASSAY OF LIPASE: CPT | Performed by: STUDENT IN AN ORGANIZED HEALTH CARE EDUCATION/TRAINING PROGRAM

## 2024-09-16 PROCEDURE — 80053 COMPREHEN METABOLIC PANEL: CPT | Performed by: STUDENT IN AN ORGANIZED HEALTH CARE EDUCATION/TRAINING PROGRAM

## 2024-09-16 PROCEDURE — 81000 URINALYSIS NONAUTO W/SCOPE: CPT | Performed by: STUDENT IN AN ORGANIZED HEALTH CARE EDUCATION/TRAINING PROGRAM

## 2024-09-16 PROCEDURE — 96374 THER/PROPH/DIAG INJ IV PUSH: CPT

## 2024-09-16 PROCEDURE — 84484 ASSAY OF TROPONIN QUANT: CPT | Performed by: STUDENT IN AN ORGANIZED HEALTH CARE EDUCATION/TRAINING PROGRAM

## 2024-09-16 PROCEDURE — 99284 EMERGENCY DEPT VISIT MOD MDM: CPT | Mod: 25

## 2024-09-16 RX ORDER — DROPERIDOL 2.5 MG/ML
1.25 INJECTION, SOLUTION INTRAMUSCULAR; INTRAVENOUS ONCE
Status: COMPLETED | OUTPATIENT
Start: 2024-09-16 | End: 2024-09-16

## 2024-09-16 RX ORDER — KETOROLAC TROMETHAMINE 30 MG/ML
15 INJECTION, SOLUTION INTRAMUSCULAR; INTRAVENOUS
Status: COMPLETED | OUTPATIENT
Start: 2024-09-16 | End: 2024-09-16

## 2024-09-16 RX ADMIN — DROPERIDOL 1.25 MG: 2.5 INJECTION, SOLUTION INTRAMUSCULAR; INTRAVENOUS at 11:09

## 2024-09-16 RX ADMIN — KETOROLAC TROMETHAMINE 15 MG: 30 INJECTION, SOLUTION INTRAMUSCULAR; INTRAVENOUS at 11:09

## 2024-09-16 RX ADMIN — SODIUM CHLORIDE 1000 ML: 9 INJECTION, SOLUTION INTRAVENOUS at 11:09

## 2024-09-16 NOTE — Clinical Note
"Karen"Jessica Paredes was seen and treated in our emergency department on 9/16/2024.  She may return to work on 09/19/2024.       If you have any questions or concerns, please don't hesitate to call.      Jeovany Aviles MD"

## 2024-09-17 VITALS
HEART RATE: 60 BPM | SYSTOLIC BLOOD PRESSURE: 114 MMHG | RESPIRATION RATE: 19 BRPM | TEMPERATURE: 98 F | DIASTOLIC BLOOD PRESSURE: 58 MMHG | HEIGHT: 61 IN | BODY MASS INDEX: 44.75 KG/M2 | WEIGHT: 237 LBS | OXYGEN SATURATION: 100 %

## 2024-09-17 LAB
ALBUMIN SERPL BCP-MCNC: 3.8 G/DL (ref 3.5–5.2)
ALP SERPL-CCNC: 65 U/L (ref 55–135)
ALT SERPL W/O P-5'-P-CCNC: 44 U/L (ref 10–44)
ANION GAP SERPL CALC-SCNC: 8 MMOL/L (ref 8–16)
AST SERPL-CCNC: 96 U/L (ref 10–40)
B-HCG UR QL: NEGATIVE
BACTERIA #/AREA URNS HPF: NORMAL /HPF
BASOPHILS # BLD AUTO: 0.02 K/UL (ref 0–0.2)
BASOPHILS NFR BLD: 0.2 % (ref 0–1.9)
BILIRUB SERPL-MCNC: 0.2 MG/DL (ref 0.1–1)
BILIRUB UR QL STRIP: NEGATIVE
BUN SERPL-MCNC: 8 MG/DL (ref 6–20)
CALCIUM SERPL-MCNC: 8.7 MG/DL (ref 8.7–10.5)
CHLORIDE SERPL-SCNC: 108 MMOL/L (ref 95–110)
CLARITY UR: ABNORMAL
CO2 SERPL-SCNC: 22 MMOL/L (ref 23–29)
COLOR UR: YELLOW
CREAT SERPL-MCNC: 0.8 MG/DL (ref 0.5–1.4)
CTP QC/QA: YES
CTP QC/QA: YES
DIFFERENTIAL METHOD BLD: ABNORMAL
EOSINOPHIL # BLD AUTO: 0.2 K/UL (ref 0–0.5)
EOSINOPHIL NFR BLD: 1.6 % (ref 0–8)
ERYTHROCYTE [DISTWIDTH] IN BLOOD BY AUTOMATED COUNT: 14.3 % (ref 11.5–14.5)
EST. GFR  (NO RACE VARIABLE): >60 ML/MIN/1.73 M^2
GLUCOSE SERPL-MCNC: 154 MG/DL (ref 70–110)
GLUCOSE UR QL STRIP: NEGATIVE
HCT VFR BLD AUTO: 34.4 % (ref 37–48.5)
HGB BLD-MCNC: 11.2 G/DL (ref 12–16)
HGB UR QL STRIP: NEGATIVE
HYALINE CASTS #/AREA URNS LPF: 0 /LPF
IMM GRANULOCYTES # BLD AUTO: 0.03 K/UL (ref 0–0.04)
IMM GRANULOCYTES NFR BLD AUTO: 0.3 % (ref 0–0.5)
KETONES UR QL STRIP: NEGATIVE
LEUKOCYTE ESTERASE UR QL STRIP: NEGATIVE
LIPASE SERPL-CCNC: 35 U/L (ref 4–60)
LYMPHOCYTES # BLD AUTO: 1.7 K/UL (ref 1–4.8)
LYMPHOCYTES NFR BLD: 18 % (ref 18–48)
MCH RBC QN AUTO: 26.4 PG (ref 27–31)
MCHC RBC AUTO-ENTMCNC: 32.6 G/DL (ref 32–36)
MCV RBC AUTO: 81 FL (ref 82–98)
MICROSCOPIC COMMENT: NORMAL
MONOCYTES # BLD AUTO: 0.7 K/UL (ref 0.3–1)
MONOCYTES NFR BLD: 7.8 % (ref 4–15)
NEUTROPHILS # BLD AUTO: 6.7 K/UL (ref 1.8–7.7)
NEUTROPHILS NFR BLD: 72.1 % (ref 38–73)
NITRITE UR QL STRIP: NEGATIVE
NRBC BLD-RTO: 0 /100 WBC
OHS QRS DURATION: 72 MS
OHS QTC CALCULATION: 408 MS
PH UR STRIP: 6 [PH] (ref 5–8)
PLATELET # BLD AUTO: 266 K/UL (ref 150–450)
PMV BLD AUTO: 10.5 FL (ref 9.2–12.9)
POTASSIUM SERPL-SCNC: 3.6 MMOL/L (ref 3.5–5.1)
PROT SERPL-MCNC: 7.2 G/DL (ref 6–8.4)
PROT UR QL STRIP: ABNORMAL
RBC # BLD AUTO: 4.24 M/UL (ref 4–5.4)
RBC #/AREA URNS HPF: 1 /HPF (ref 0–4)
SARS-COV-2 RDRP RESP QL NAA+PROBE: NEGATIVE
SODIUM SERPL-SCNC: 138 MMOL/L (ref 136–145)
SP GR UR STRIP: >1.03 (ref 1–1.03)
SQUAMOUS #/AREA URNS HPF: 11 /HPF
TROPONIN I SERPL DL<=0.01 NG/ML-MCNC: <0.006 NG/ML (ref 0–0.03)
URN SPEC COLLECT METH UR: ABNORMAL
UROBILINOGEN UR STRIP-ACNC: ABNORMAL EU/DL
WBC # BLD AUTO: 9.31 K/UL (ref 3.9–12.7)
WBC #/AREA URNS HPF: 2 /HPF (ref 0–5)

## 2024-09-17 PROCEDURE — 85025 COMPLETE CBC W/AUTO DIFF WBC: CPT | Performed by: STUDENT IN AN ORGANIZED HEALTH CARE EDUCATION/TRAINING PROGRAM

## 2024-09-17 RX ORDER — LOPERAMIDE HYDROCHLORIDE AND SIMETHICONE 2; 125 MG/1; MG/1
1 TABLET ORAL 3 TIMES DAILY PRN
Qty: 20 EACH | Refills: 0 | Status: SHIPPED | OUTPATIENT
Start: 2024-09-17 | End: 2024-09-27

## 2024-09-17 RX ORDER — METOCLOPRAMIDE 10 MG/1
10 TABLET ORAL EVERY 6 HOURS PRN
Qty: 20 TABLET | Refills: 0 | Status: SHIPPED | OUTPATIENT
Start: 2024-09-17

## 2024-09-17 RX ORDER — DICYCLOMINE HYDROCHLORIDE 20 MG/1
20 TABLET ORAL 2 TIMES DAILY
Qty: 14 TABLET | Refills: 0 | Status: SHIPPED | OUTPATIENT
Start: 2024-09-17 | End: 2024-09-24

## 2024-09-17 NOTE — ED PROVIDER NOTES
ED Provider Note - 9/16/2024    History     Chief Complaint   Patient presents with    Abdominal Pain     Abd pain nausea and vomiting x3 days. CP starting today around 30 minutes ago       HPI     Karen Paredes is a 29 y.o. year old female with past medical and surgical history as seen below, presenting with chief complaint of generalized abdominal pain.  Associated with the nausea and vomiting.  Ongoing for 3 days.  Had Zofran home which has helped symptoms at times.  Vomited through the Zofran earlier today.  After the episode of vomiting began having some substernal chest pain.  Throughout this time has had intermittent fever and chills.      Past Medical History:   Diagnosis Date    Diabetes mellitus, type 2     Morbid obesity with BMI of 40.0-44.9, adult     Obesity      Past Surgical History:   Procedure Laterality Date    NO PAST SURGERIES      TONSILLECTOMY Bilateral 10/01/2020    Procedure: TONSILLECTOMY;  Surgeon: Meenakshi Cannon MD;  Location: [facility];  Service: ENT;  Laterality: Bilateral;  RN PREOP 9/25/2020   T/S---COVID -NEGATIVE ON 9/30  UPT    TONSILLECTOMY  10/2020         Family History   Problem Relation Name Age of Onset    No Known Problems Mother      No Known Problems Father      Stroke Maternal Grandfather Alessia Paredes     Diabetes Maternal Grandmother Mary Paredes     Breast cancer Neg Hx      Colon cancer Neg Hx      Ovarian cancer Neg Hx      Cervical cancer Neg Hx      Uterine cancer Neg Hx       Social History     Tobacco Use    Smoking status: Never     Passive exposure: Past    Smokeless tobacco: Current   Substance Use Topics    Alcohol use: Yes     Alcohol/week: 4.0 standard drinks of alcohol     Types: 2 Glasses of wine, 2 Shots of liquor per week     Comment: occ    Drug use: No     Social Determinants of Health with Concerns     Tobacco Use: High Risk (8/21/2024)    Patient History     Smoking Tobacco Use: Never     Smokeless Tobacco Use: Current     Passive Exposure:  Past   Physical Activity: Insufficiently Active (6/27/2024)    Exercise Vital Sign     Days of Exercise per Week: 3 days     Minutes of Exercise per Session: 20 min      Review of patient's allergies indicates:  No Known Allergies    Review of Systems     A full Review of Systems (ROS) was performed and was negative unless otherwise stated in the HPI.      Physical Exam     Vitals:    09/17/24 0104 09/17/24 0203 09/17/24 0303 09/17/24 0305   BP: 125/62 (!) 114/58     BP Location:       Pulse:   60    Resp: 19   19   Temp:       TempSrc:       SpO2: 100% 100%     Weight:       Height:            Physical Exam    Nursing note and vitals reviewed.  Constitutional: She appears well-developed and well-nourished. No distress.   HENT:   Head: Normocephalic and atraumatic.   Right Ear: External ear normal.   Left Ear: External ear normal.   Nose: Nose normal.   Mouth/Throat: Oropharynx is clear and moist.   Eyes: Conjunctivae and EOM are normal. Pupils are equal, round, and reactive to light.   Neck: Neck supple.   Normal range of motion.  Cardiovascular:  Normal rate, regular rhythm, normal heart sounds and intact distal pulses.           Pulmonary/Chest: Breath sounds normal. No stridor. No respiratory distress. She has no wheezes. She has no rhonchi. She has no rales.   Abdominal: Abdomen is soft. Bowel sounds are normal. She exhibits no distension. There is generalized abdominal tenderness. No hernia.   Musculoskeletal:         General: No tenderness or edema. Normal range of motion.      Cervical back: Normal range of motion and neck supple.     Neurological: She is alert and oriented to person, place, and time. She has normal strength. No cranial nerve deficit or sensory deficit.   Skin: Skin is warm and dry. No rash noted.   Psychiatric: She has a normal mood and affect. Thought content normal.         Lab Results- Independently reviewed by myself      Labs Reviewed   COMPREHENSIVE METABOLIC PANEL - Abnormal        Result Value    Sodium 138      Potassium 3.6      Chloride 108      CO2 22 (*)     Glucose 154 (*)     BUN 8      Creatinine 0.8      Calcium 8.7      Total Protein 7.2      Albumin 3.8      Total Bilirubin 0.2      Alkaline Phosphatase 65      AST 96 (*)     ALT 44      eGFR >60      Anion Gap 8     URINALYSIS, REFLEX TO URINE CULTURE - Abnormal    Specimen UA Urine, Clean Catch      Color, UA Yellow      Appearance, UA Hazy (*)     pH, UA 6.0      Specific Gravity, UA >1.030 (*)     Protein, UA 1+ (*)     Glucose, UA Negative      Ketones, UA Negative      Bilirubin (UA) Negative      Occult Blood UA Negative      Nitrite, UA Negative      Urobilinogen, UA 4.0-6.0 (*)     Leukocytes, UA Negative      Narrative:     Specimen Source->Urine   CBC W/ AUTO DIFFERENTIAL - Abnormal    WBC 9.31      RBC 4.24      Hemoglobin 11.2 (*)     Hematocrit 34.4 (*)     MCV 81 (*)     MCH 26.4 (*)     MCHC 32.6      RDW 14.3      Platelets 266      MPV 10.5      Immature Granulocytes 0.3      Gran # (ANC) 6.7      Immature Grans (Abs) 0.03      Lymph # 1.7      Mono # 0.7      Eos # 0.2      Baso # 0.02      nRBC 0      Gran % 72.1      Lymph % 18.0      Mono % 7.8      Eosinophil % 1.6      Basophil % 0.2      Differential Method Automated     LIPASE    Lipase 35     TROPONIN I    Troponin I <0.006     URINALYSIS MICROSCOPIC    RBC, UA 1      WBC, UA 2      Bacteria Rare      Squam Epithel, UA 11      Hyaline Casts, UA 0      Microscopic Comment SEE COMMENT      Narrative:     Specimen Source->Urine   POCT URINE PREGNANCY    POC Preg Test, Ur Negative       Acceptable Yes     SARS-COV-2 RDRP GENE    POC Rapid COVID Negative       Acceptable Yes             Imaging     Imaging Results    None                    ED Course         Procedures         Orders Placed This Encounter    Comp. Metabolic Panel    Lipase    Urinalysis, Reflex to Urine Culture Urine, Clean Catch    Troponin I    Urinalysis  Microscopic    CBC auto differential    Give PO Challenge    POCT urine pregnancy    POCT COVID-19 Rapid Screening    EKG 12-lead    Insert peripheral IV    sodium chloride 0.9% bolus 1,000 mL 1,000 mL    droPERidol injection 1.25 mg    ketorolac injection 15 mg    metoclopramide HCl (REGLAN) 10 MG tablet    dicyclomine (BENTYL) 20 mg tablet    loperamide-simethicone 2-125 mg Tab          ED Course as of 09/17/24 2244   Tue Sep 17, 2024   0131 EKG 12-lead  Independent interpretation by myself:  Limited due to artifact   Normal sinus rhythm  Rate 82   No ectopy   No STEMI [KB]      ED Course User Index  [KB] Jeovany Aviles MD              Medical Decision Making       The patient's list of active medical problems, social history, medications, and allergies as documented per RN staff has been reviewed.           Medical Decision Making  This patient presents with nausea, vomiting, & diarrhea. Differential diagnosis includes possible acute gastroenteritis. Abdominal exam without peritoneal signs. Currently euvolemic without evidence of dehydration. Doubt invasive bacteria causing diarrhea such as C diff (no recent antibiotics), shiga toxin (non bloody). No recent travel. Patient is not immunocompromised. Diarrhea is non bloody so less likely inflammatory bowel disease. No evidence of surgical abdomen or other acute medical emergency including bowel obstruction, viscus perforation, vascular catastrophe, atypical appendicitis, acute cholecystitis, UGIB, thyrotoxicosis, or diverticulitis at this time. Presentation not consistent with other acute, emergent causes of vomiting / diarrhea at this time. Considered abdominal imaging (KUB vs CT A/P) but do not feel that this would .     Symptoms improved with treatment in the ED. Patient advised to continue hydrating at home with electrolyte containing solutions. Advance diet slowly with small, bland meals at first prior to returning to your normal diet. Take  the nausea medicines prescribed as needed, and to return to the ED for reevaluation should symptoms worsen, return, change in character, or should new symptoms develop.        Problems Addressed:  Gastroenteritis: acute illness or injury    Amount and/or Complexity of Data Reviewed  Labs: ordered.     Details: CBC: No significant anemia, platelet disorder, or leukocytosis.  CMP: Normal electrolytes, renal function, liver enzymes  UA negative for leukocytes and nitrites making urinary infection highly unlikely.    ECG/medicine tests: independent interpretation performed. Decision-making details documented in ED Course.    Risk  OTC drugs.  Prescription drug management.                    ED Prescriptions       Medication Sig Dispense Start Date End Date Auth. Provider    metoclopramide HCl (REGLAN) 10 MG tablet Take 1 tablet (10 mg total) by mouth every 6 (six) hours as needed (Nausea and vomiting). 20 tablet 9/17/2024 -- Jeovany Aviles MD    dicyclomine (BENTYL) 20 mg tablet Take 1 tablet (20 mg total) by mouth 2 (two) times daily. for 7 days 14 tablet 9/17/2024 9/24/2024 Jeovany Aviles MD    loperamide-simethicone 2-125 mg Tab Take 1 tablet by mouth 3 (three) times daily as needed (diarrhea). 20 each 9/17/2024 9/27/2024 Jeovany Aviles MD              Clinical Impression       Follow-up Information       Follow up With Specialties Details Why Contact Kaleb Werner III, MD Internal Medicine Schedule an appointment as soon as possible for a visit in 5 days For follow-up on today's visit. [address] [facility] [id number] [phone]      [facility] - Emergency Dept Emergency Medicine Go to  As needed, If symptoms worsen [address] [facility] [address] [phone]            Referrals:  No orders of the defined types were placed in this encounter.      Disposition   ED Disposition Condition    Discharge Stable              Final diagnoses:  [R07.9] Chest pain  [K52.9] Gastroenteritis  (Primary)        Jeovany Aviles MD        09/17/2024          DISCLAIMER: This note was prepared with [employer] voice recognition transcription software. Garbled syntax, mangled pronouns, and other bizarre constructions may be attributed to that software system.       Jeovany Aviles MD  09/17/24 1310

## 2024-10-01 ENCOUNTER — PATIENT MESSAGE (OUTPATIENT)
Dept: BARIATRICS | Facility: CLINIC | Age: 29
End: 2024-10-01
Payer: COMMERCIAL

## 2024-10-03 ENCOUNTER — OFFICE VISIT (OUTPATIENT)
Dept: SLEEP MEDICINE | Facility: CLINIC | Age: 29
End: 2024-10-03
Attending: PSYCHIATRY & NEUROLOGY
Payer: COMMERCIAL

## 2024-10-03 DIAGNOSIS — G47.30 SLEEP APNEA, UNSPECIFIED TYPE: Primary | ICD-10-CM

## 2024-10-03 PROCEDURE — 99204 OFFICE O/P NEW MOD 45 MIN: CPT | Mod: 95,,, | Performed by: PSYCHIATRY & NEUROLOGY

## 2024-10-03 PROCEDURE — 3044F HG A1C LEVEL LT 7.0%: CPT | Mod: CPTII,95,, | Performed by: PSYCHIATRY & NEUROLOGY

## 2024-10-03 NOTE — PROGRESS NOTES
The patient location is: home  The chief complaint leading to consultation is: sleep disorder  Visit type: audiovisual  Each patient to whom he or she provides medical services by telemedicine is:  (1) informed of the relationship between the physician and patient and the respective role of any other health care provider with respect to management of the patient; and (2) notified that he or she may decline to receive medical services by telemedicine and may withdraw from such care at any time.  31 minutes of total time spent on the encounter, which includes face to face time and non-face to face time preparing to see the patient (eg, review of tests), Obtaining and/or reviewing separately obtained history, Documenting clinical information in the electronic or other health record, Independently interpreting results (not separately reported) and communicating results to the patient/family/caregiver, or Care coordination (not separately reported).      Karen Paredes is a 29 y.o. female is here to be evaluated for a sleep disorder; referred by No ref. provider found.    Karen Paredes has been complaining  of snoring, choking and witnessed apneas and excessive daytime sleepiness   since highschool even prior to gaining weight.    Had tonsillectomy last year; still no improvement in her symptoms.    Taking Ozempic (for DM) but does not tolerate well.      The patient feels rested upon awakening. Does not take naps.     The patient  denies morning headaches and reports  dry mouth on awakening.   Karen Paredes reports occasional  nasal congestion. + allergies    The patient had tonsillectomy in the past    The patient occasionally   reports difficulty with falling and staying asleep. Not taking any sleeping aids    Karen Paredes  denies symptoms concerning for parasomnia except for occasional somniloquy.  The patient  denies auxiliary symptoms of narcolepsy including sleep onset paralysis, hypnagogic  hallucinations, sleep attacks and cataplexy.    Karen Paredes denied symptoms concerning for RLS; nocturnal leg movements have not been noticed.   The patient does not experience sleep related leg cramps.           Medications pertinent to sleep  disorders taken currently: -  Previous  medications taken  for sleep disorders:  -    Sleep studies  no        10/2/2024    11:42 PM 6/23/2024    11:26 PM 2/3/2023     3:36 PM 1/23/2023     3:07 PM   EPWORTH SLEEPINESS SCALE TOTAL SCORE    Total score 11  6 5 6       Patient-reported             10/2/2024    11:42 PM   EPWORTH SLEEPINESS SCALE   Sitting and reading 1    Watching TV 1    Sitting, inactive in a public place (e.g. a theatre or a meeting) 0    As a passenger in a car for an hour without a break 3    Lying down to rest in the afternoon when circumstances permit 3    Sitting and talking to someone 0    Sitting quietly after a lunch without alcohol 3    In a car, while stopped for a few minutes in traffic 0    Total score 11        Patient-reported           6/25/2024   PHQ-9 Depression Patient Health Questionnaire   Over the last two weeks how often have you been bothered by little interest or pleasure in doing things 0   Over the last two weeks how often have you been bothered by feeling down, depressed or hopeless 0              No data to display                    10/2/2024    11:42 PM   EPWORTH SLEEPINESS SCALE   Sitting and reading 1    Watching TV 1    Sitting, inactive in a public place (e.g. a theatre or a meeting) 0    As a passenger in a car for an hour without a break 3    Lying down to rest in the afternoon when circumstances permit 3    Sitting and talking to someone 0    Sitting quietly after a lunch without alcohol 3    In a car, while stopped for a few minutes in traffic 0    Total score 11        Patient-reported         10/2/2024    11:45 PM   Sleep Clinic New Patient   What time do you go to bed on a week day? (Give a range) Around 12am to  2am   What time do you go to bed on a day off? (Give a range) 1am-4   How long does it take you to fall asleep? (Give a range) Over an hour   On average, how many times per night do you wake up? Twice   How long does it take you to fall back into sleep? (Give a range) 10 minutes   What time do you wake up to start your day on a week day? (Give a range) 7:30am   What time do you wake up to start your day on a day off? (Give a range) 9am   What time do you get out of bed? (Give a range) 7:50   On average, how many hours do you sleep? 5 or 6   On average, how many naps do you take per day? 1   Rate your sleep quality from 0 to 5 (0-poor, 5-great). 2   Have you experienced:  Weight gain?   How much weight have you lost or gained (in lbs.) in the last year? Gained 30 pounds   On average, how many times per night do you go to the bathroom?  2   Have you ever had a sleep study/CPAP machine/surgery for sleep apnea? No   Have you ever had a CPAP machine for sleep apnea? No   Have you ever had surgery for sleep apnea? No           10/2/2024    11:45 PM   Sleep Clinic ROS    Difficulty breathing through the nose?  Sometimes   Sore throat or dry mouth in the morning? Yes   Irregular or very fast heart beat?  Sometimes   Shortness of breath?  Sometimes   Acid reflux? No   Body aches and pains?  Yes   Morning headaches? Yes   Dizziness? Sometimes   Mood changes?  Sometimes   Do you exercise?  Sometimes   Do you feel like moving your legs a lot?  No       DME:         PAST MEDICAL HISTORY:    Active Ambulatory Problems     Diagnosis Date Noted    Class 3 severe obesity due to excess calories with serious comorbidity and body mass index (BMI) of 45.0 to 49.9 in adult 05/30/2018    Tonsillitis 10/01/2020    Type 2 diabetes mellitus with other specified complication, without long-term current use of insulin     Encounter for surveillance of implantable subdermal contraceptive 01/30/2023     Resolved Ambulatory Problems     Diagnosis  Date Noted    No Resolved Ambulatory Problems     Past Medical History:   Diagnosis Date    Diabetes mellitus, type 2     Morbid obesity with BMI of 40.0-44.9, adult     Obesity                 PAST SURGICAL HISTORY:    Past Surgical History:   Procedure Laterality Date    NO PAST SURGERIES      TONSILLECTOMY Bilateral 10/01/2020    Procedure: TONSILLECTOMY;  Surgeon: Meenakshi Cannon MD;  Location: [facility];  Service: ENT;  Laterality: Bilateral;  RN PREOP 9/25/2020   T/S---COVID -NEGATIVE ON 9/30  UPT    TONSILLECTOMY  10/2020         FAMILY HISTORY:                Family History   Problem Relation Name Age of Onset    No Known Problems Mother      No Known Problems Father      Stroke Maternal Grandfather Alessia Paredes     Diabetes Maternal Grandmother Mary Paredes     Breast cancer Neg Hx      Colon cancer Neg Hx      Ovarian cancer Neg Hx      Cervical cancer Neg Hx      Uterine cancer Neg Hx         SOCIAL HISTORY:          Tobacco:   Social History     Tobacco Use   Smoking Status Never    Passive exposure: Past   Smokeless Tobacco Current       alcohol use:    Social History     Substance and Sexual Activity   Alcohol Use Yes    Alcohol/week: 4.0 standard drinks of alcohol    Types: 2 Glasses of wine, 2 Shots of liquor per week    Comment: occ                   ALLERGIES:  Review of patient's allergies indicates:  No Known Allergies    CURRENT MEDICATIONS:    Current Outpatient Medications   Medication Sig Dispense Refill    blood-glucose meter Misc as per administration instructions (Patient not taking: Reported on 6/17/2024)      ergocalciferol (ERGOCALCIFEROL) 50,000 unit Cap Take 1 capsule (50,000 Units total) by mouth twice a week. for 24 doses 24 capsule 0    etonogestrel (NEXPLANON SDRM) by Subdermal route.      metoclopramide HCl (REGLAN) 10 MG tablet Take 1 tablet (10 mg total) by mouth every 6 (six) hours as needed (Nausea and vomiting). 20 tablet 0    ondansetron (ZOFRAN-ODT) 4 MG TbDL Take 1  tablet (4 mg total) by mouth every 6 (six) hours as needed (nausea). 20 tablet 0    semaglutide (OZEMPIC) 1 mg/dose (4 mg/3 mL) Inject 1 mg into the skin every 7 days. 9 mL 1    valACYclovir (VALTREX) 500 MG tablet Take 500 mg by mouth 2 (two) times daily.       No current facility-administered medications for this visit.     Facility-Administered Medications Ordered in Other Visits   Medication Dose Route Frequency Provider Last Rate Last Admin    lidocaine (PF) 10 mg/ml (1%) injection 10 mg  1 mL Intradermal Once Meenakshi Cannon MD        sodium chloride 0.9% flush 10 mL  10 mL Intravenous PRN Meenakshi Cannon MD                          PHYSICAL EXAM:  There were no vitals taken for this visit.          ASSESSMENT:    1. Sleep Apnea NEC. The patient symptomatically has  snoring, choking , witnessed apneas, and excessive daytime sleepiness  with exam findings of elevated BMI. The patient has medical co-morbidities of diabetes  and obesity which can be worsened by FEI. This warrants further investigation for possible obstructive sleep apnea.              PLAN:    Diagnostic: Home Sleep Study. The nature of this procedure and its indication was discussed with the patient. We will notify the patient about sleep study resuts via My Chart.      Treatment optionsfor possible FEI were discussed with the patient; she is reluctant to start CPAP  Will present all the alternatives when reporting HST reults to her on My chart.       During our discussion today, we talked about the etiology of  FEI as well as the potential ramifications of untreated sleep apnea, which could include daytime sleepiness, hypertension, heart disease and/or stroke.  We discussed potential treatment options, which could include weight loss, body positioning, continuous positive airway pressure (CPAP), or referral for surgical consideration. Meanwhile, she  is urged to avoid supine sleep, weight gain and alcoholic beverages since all of these  can worsen FEI.     The patient was given open opportunity to ask questions and/or express concerns about treatment plan. Two point patient identifier confirmed.       Precautions: The patient was advised to abstain from driving should he feel sleepy or drowsy.    Follow up: MD after the sleep study has been completed.     ESS ([employer] [place] Sleepiness Scale) and other sleep medicine related questionnaires were reviewed with the patient.      46-minute visit. >50% spent counseling patient and coordination of care.  The patient was  cautioned against drowsy driving.

## 2024-10-07 ENCOUNTER — CLINICAL SUPPORT (OUTPATIENT)
Dept: BARIATRICS | Facility: CLINIC | Age: 29
End: 2024-10-07
Payer: COMMERCIAL

## 2024-10-07 DIAGNOSIS — E66.01 MORBID OBESITY WITH BMI OF 45.0-49.9, ADULT: ICD-10-CM

## 2024-10-07 DIAGNOSIS — E11.69 TYPE 2 DIABETES MELLITUS WITH OTHER SPECIFIED COMPLICATION, WITHOUT LONG-TERM CURRENT USE OF INSULIN: ICD-10-CM

## 2024-10-07 DIAGNOSIS — Z71.3 DIETARY COUNSELING: Primary | ICD-10-CM

## 2024-10-07 PROCEDURE — 97803 MED NUTRITION INDIV SUBSEQ: CPT | Mod: 95,,, | Performed by: DIETITIAN, REGISTERED

## 2024-10-07 NOTE — PROGRESS NOTES
The patient location is: home ([place])  The chief complaint leading to consultation is: morbid obesity    Visit type: audiovisual    Face to Face time with patient: 15 min  15 minutes of total time spent on the encounter, which includes face to face time and non-face to face time preparing to see the patient (eg, review of tests), Obtaining and/or reviewing separately obtained history, Documenting clinical information in the electronic or other health record, Independently interpreting results (not separately reported) and communicating results to the patient/family/caregiver, or Care coordination (not separately reported).         Each patient to whom he or she provides medical services by telemedicine is:  (1) informed of the relationship between the physician and patient and the respective role of any other health care provider with respect to management of the patient; and (2) notified that he or she may decline to receive medical services by telemedicine and may withdraw from such care at any time.      NUTRITIONAL Note     Referring Physician: Yue Betancourt M.D.   Reason for MNT Referral: Follow up assessment for laparoscopic Prasanth-en-Y work-up     29 y.o. female presents for follow up with self reported 12 lbs weight loss over the past 2 months. Doing better with meal planning and prep for the week ahead.              Past Medical History:   Diagnosis Date    Diabetes mellitus, type 2      Morbid obesity with BMI of 40.0-44.9, adult      Obesity        CLINICAL DATA:  29 y.o.-year-old Black or  female.  Height: 5 ft.  Weight: 233 lbs  IBW: 126 lbs  BMI: 45.4     DAILY NUTRITIONAL NEEDS: pre-op nutritional bariatric guidelines to promote weight loss  4199-2508 Calories    Grams Protein     NUTRITION & HEALTH HISTORY:  Greatest challenge: dining out frequency, starchy CHO, and irregular meal patterns    Current diet recall:    B: 2 eggs, granola bar OR Premier shake  L: meal prep (chicken  breast, 1/2 cup white rice, 1/2 cup corn) OR Premier shake  D: meal prep (salmon, 1/2 cup white rice, 1/2 cup corn)     Diet Includes:  Meal pattern: 1-2 meals + 1-2 protein supplements  Protein supplements: Premier shakes  Snacking: sunflower seeds  Vegetables: Likes a few. Eats almost daily.  Fruits: Likes a variety. Eats rarely.  Beverages: water, CL  Dining out: Daily. Weekly. Mostly fast food, restaurants, and take-out. Subway sandwiches, Chick matt A, soup from restaurant, pizza, Al's breakfast  Cooking at home: Weekly, baked and sauteed, meat and fish, vegetables, starchy CHO. Red beans and rice, tacos, baked chicken wings.     Exercise:  1-3 times/week - virtual boot camp - a lot of squats, jumping jacks     Restrictions to exercise: time management, none, foot pain from an old injury     Vitamins / Minerals / Herbs:   Mv gummies  Vit D Rx 50,000IU twice weekly     Labs:   Reviewed     Vit D deficiency     Food Allergies:   None known     Social:  Works from home  Lives alone.  Grocery shopping and food prep rarely done.  Patient believes her mom and sister will be supportive after surgery.  Alcohol: Socially.  Smoking: None.     ASSESSMENT:  Patient demonstrated knowledge of healthy eating behaviors and exercise patterns.  Patient demonstrates willingness to change lifestyle and make behavior modifications AEB weight loss, dietary changes, protein supplements and exercise.           Barriers to Education: none     Stage of change: action     NUTRITION DIAGNOSIS:    Morbid Obesity related to Excessive carbohydrate intake, Excessive calorie intake, and Physical inactivity as evidence by BMI.     BARIATRIC DIET DISCUSSION/PLAN:  Discussed diet after surgery and related to patient's food record.  Reviewed nutrition guidelines for before and after surgery.  Answered all questions.  Continue to review Bariatric Nutrition Guidebook at home and call with any questions.  Work on Bariatric Nutrition Checklist.  Work  on expanding variety of vegetables.  Continue cutting back on starchy CHO in the diet.  1200-calorie diet.  1500-calorie diet.  Maintain/Increase exercise.      RECOMMENDATIONS:  Pt is a potential candidate for bariatric surgery - Gastric Bypass.  Follow up in one month.     Patient verbalized understanding.     Communicated nutrition plan with bariatric team.     SESSION TIME:  15 minutes

## 2024-10-08 ENCOUNTER — PATIENT MESSAGE (OUTPATIENT)
Dept: BARIATRICS | Facility: CLINIC | Age: 29
End: 2024-10-08
Payer: COMMERCIAL

## 2024-10-22 LAB
HUMAN PAPILLOMAVIRUS (HPV): NORMAL
PAP RECOMMENDATION EXT: NORMAL

## 2024-10-25 LAB — HEP B SURFACE AG: NORMAL

## 2024-11-02 ENCOUNTER — PATIENT MESSAGE (OUTPATIENT)
Dept: BARIATRICS | Facility: CLINIC | Age: 29
End: 2024-11-02
Payer: COMMERCIAL

## 2024-11-04 ENCOUNTER — PATIENT MESSAGE (OUTPATIENT)
Dept: BARIATRICS | Facility: CLINIC | Age: 29
End: 2024-11-04
Payer: COMMERCIAL

## 2024-11-04 ENCOUNTER — TELEPHONE (OUTPATIENT)
Dept: BARIATRICS | Facility: CLINIC | Age: 29
End: 2024-11-04
Payer: COMMERCIAL

## 2024-11-04 NOTE — TELEPHONE ENCOUNTER
Spoke to patient regarding wishes for her surgery. She advised that she would like me to appeal on her current insurance once she is cleared by diet on 11/8. She stated that she is fine with using another surgeon, since Dr. Betancourt will be out for the holidays.

## 2024-11-07 ENCOUNTER — CLINICAL SUPPORT (OUTPATIENT)
Dept: BARIATRICS | Facility: CLINIC | Age: 29
End: 2024-11-07
Payer: COMMERCIAL

## 2024-11-07 VITALS — BODY MASS INDEX: 46.03 KG/M2 | WEIGHT: 243.63 LBS

## 2024-11-07 DIAGNOSIS — E66.01 MORBID OBESITY WITH BMI OF 45.0-49.9, ADULT: ICD-10-CM

## 2024-11-07 DIAGNOSIS — E11.69 TYPE 2 DIABETES MELLITUS WITH OTHER SPECIFIED COMPLICATION, WITHOUT LONG-TERM CURRENT USE OF INSULIN: ICD-10-CM

## 2024-11-07 DIAGNOSIS — Z71.3 DIETARY COUNSELING: Primary | ICD-10-CM

## 2024-11-07 PROCEDURE — 99999 PR PBB SHADOW E&M-EST. PATIENT-LVL I: CPT | Mod: PBBFAC,,, | Performed by: DIETITIAN, REGISTERED

## 2024-11-07 NOTE — PROGRESS NOTES
NUTRITIONAL Note     Referring Physician: Yue Betancourt M.D.   Reason for MNT Referral: Follow up assessment for laparoscopic Prasanth-en-Y work-up     29 y.o. female presents for follow up with 10 lbs weight gain over the past month; total 5 lbs weight gain over the past 6 months since initial visit. Admits she has not been exercising. Started working a second job. Not making time for meal prep. Stressed out. Eating more Carbs/sugar. No protein shakes. Less vegetables.              Past Medical History:   Diagnosis Date    Diabetes mellitus, type 2      Morbid obesity with BMI of 40.0-44.9, adult      Obesity        CLINICAL DATA:  29 y.o.-year-old Black or  female.  Height: 5 ft.  Weight: 243 lbs  IBW: 126 lbs  BMI: 46     DAILY NUTRITIONAL NEEDS: pre-op nutritional bariatric guidelines to promote weight loss  9769-8541 Calories    Grams Protein     NUTRITION & HEALTH HISTORY:  Greatest challenge: dining out frequency, starchy CHO, and irregular meal patterns     Current diet recall:     B: eggs and noyola OR pancakes with butter and syrup  - large coffee frappe  L: baked chicken wings  D: baked fish and mashed potatoes      Diet Includes:  Meal pattern: 3 meals + 0 protein supplements  Protein supplements: none lately. Premier shakes  Snacking: sunflower seeds  Vegetables: Likes a few. Eats almost daily.  Fruits: Likes a variety. Eats rarely.  Beverages: water, CL  Dining out: Daily. Weekly. Mostly fast food, restaurants, and take-out. Subway sandwiches, Chick matt A, soup from restaurant, pizza, Al's breakfast  Cooking at home: Weekly, baked and sauteed, meat and fish, vegetables, starchy CHO. Red beans and rice, tacos, baked chicken wings.     Exercise:  None lately.  Usual routine. 1-3 times/week - virtual boot camp - a lot of squats, jumping jacks     Restrictions to exercise: time management with second job     Vitamins / Minerals / Herbs:   Mv gummies  Vit D Rx 50,000IU twice weekly      Labs:   Reviewed     Vit D deficiency     Food Allergies:   None known     Social:  Works from home  Lives alone.  Grocery shopping and food prep rarely done.  Patient believes her mom and sister will be supportive after surgery.  Alcohol: Socially.  Smoking: None.     ASSESSMENT:  Patient demonstrated knowledge of healthy eating behaviors and exercise patterns; admits to not eating healthy and not exercising at this time.  Patient states willingness to change lifestyle and make behavior modifications.        Barriers to Education: none     Stage of change: determination     NUTRITION DIAGNOSIS:    Morbid Obesity related to Excessive carbohydrate intake, Excessive calorie intake, and Physical inactivity as evidence by BMI.     BARIATRIC DIET DISCUSSION/PLAN:  Discussed diet after surgery and related to patient's food record.  Reviewed nutrition guidelines for before and after surgery.  Answered all questions.  Continue to review Bariatric Nutrition Guidebook at home and call with any questions.  Work on Bariatric Nutrition Checklist.  Work on expanding variety of vegetables.  Continue cutting back on starchy CHO in the diet.  1200-calorie diet.  1500-calorie diet.  Resume exercise.   Include protein supplements as a meal replacement daily.     RECOMMENDATIONS:  Pt is a potential candidate for bariatric surgery - Gastric Bypass.  Follow up in one month.     Patient verbalized understanding.     Communicated nutrition plan with bariatric team.     SESSION TIME:  30 minutes

## 2024-11-12 ENCOUNTER — PATIENT MESSAGE (OUTPATIENT)
Dept: BARIATRICS | Facility: CLINIC | Age: 29
End: 2024-11-12
Payer: COMMERCIAL

## 2024-11-27 ENCOUNTER — PATIENT MESSAGE (OUTPATIENT)
Dept: RESEARCH | Facility: HOSPITAL | Age: 29
End: 2024-11-27
Payer: COMMERCIAL

## 2024-12-06 ENCOUNTER — CLINICAL SUPPORT (OUTPATIENT)
Dept: BARIATRICS | Facility: CLINIC | Age: 29
End: 2024-12-06
Payer: COMMERCIAL

## 2024-12-06 VITALS — BODY MASS INDEX: 45.15 KG/M2 | WEIGHT: 239 LBS

## 2024-12-06 DIAGNOSIS — E11.69 TYPE 2 DIABETES MELLITUS WITH OTHER SPECIFIED COMPLICATION, WITHOUT LONG-TERM CURRENT USE OF INSULIN: ICD-10-CM

## 2024-12-06 DIAGNOSIS — E66.01 MORBID OBESITY WITH BMI OF 45.0-49.9, ADULT: ICD-10-CM

## 2024-12-06 DIAGNOSIS — Z71.3 DIETARY COUNSELING: Primary | ICD-10-CM

## 2024-12-06 PROCEDURE — 99999 PR PBB SHADOW E&M-EST. PATIENT-LVL I: CPT | Mod: PBBFAC,,, | Performed by: DIETITIAN, REGISTERED

## 2024-12-06 NOTE — PROGRESS NOTES
NUTRITIONAL Note     Referring Physician: Yue Betancourt M.D.   Reason for MNT Referral: Follow up assessment for laparoscopic Prasanth-en-Y work-up     29 y.o. female presents for follow up with 5 lbs weight loss over the past month. States she has been making dietary and lifestyle changes in preparation for bariatric surgery while adjusting to 2 jobs. Doing meal prep on Sundays which helps stay on track throughout the week.              Past Medical History:   Diagnosis Date    Diabetes mellitus, type 2      Morbid obesity with BMI of 40.0-44.9, adult      Obesity        CLINICAL DATA:  29 y.o.-year-old Black or  female.  Height: 5 ft.  Weight: 238 lbs  IBW: 126 lbs  BMI: 45.1     DAILY NUTRITIONAL NEEDS: pre-op nutritional bariatric guidelines to promote weight loss  2553-4758 Calories    Grams Protein     NUTRITION & HEALTH HISTORY:  Greatest challenge: dining out frequency, starchy CHO, and irregular meal patterns     Current diet recall:     B: 2 eggs or Premier shake   L: beef and veg soup  - sunflower seeds  D: beef and veg soup     Diet Includes:  Meal pattern: 2-3 meals + 1 protein supplements  Protein supplements: Premier shakes  Snacking: sunflower seeds  Vegetables: Likes a few. Eats almost daily.  Fruits: Likes a variety. Eats rarely.  Beverages: water, CL - trying to increase throughout the day  Dining out: Daily. Weekly. Mostly fast food, restaurants, and take-out.   Cooking at home: Weekly, baked and sauteed, meat and fish, vegetables, (limited starchy CHO).     Exercise:  None lately d/t working 2 jobs.  Usual routine. 1-3 times/week - virtual boot camp - a lot of squats, jumping jacks     Restrictions to exercise: time management with second job     Vitamins / Minerals / Herbs:   Mv gummies  Vit D Rx 50,000IU twice weekly - completed     Labs:   Reviewed     Vit D deficiency     Food Allergies:   None known     Social:  Works from home  Lives alone.  Grocery shopping and food prep  rarely done.  Patient believes her mom and sister will be supportive after surgery.  Alcohol: Socially.  Smoking: None.     ASSESSMENT:  Patient demonstrated knowledge of healthy eating behaviors and exercise patterns.  Patient demonstrates willingness to change lifestyle and make behavior modifications AEB weight loss, dietary changes, protein supplements.        Barriers to Education: none     Stage of change: action     NUTRITION DIAGNOSIS:    Morbid Obesity related to Excessive carbohydrate intake, Excessive calorie intake, and Physical inactivity as evidence by BMI.     BARIATRIC DIET DISCUSSION/PLAN:  Discussed diet after surgery and related to patient's food record.  Reviewed nutrition guidelines for before and after surgery.  Answered all questions.  Continue to review Bariatric Nutrition Guidebook at home and call with any questions.  Work on Bariatric Nutrition Checklist.  Work on expanding variety of vegetables.  Continue cutting back on starchy CHO in the diet.  1200-calorie diet.  1500-calorie diet.  Resume exercise routine or try to increase steps/movement throughout the day while at work.      RECOMMENDATIONS:  Pt is a good candidate for bariatric surgery - Gastric Bypass.     Patient verbalized understanding.    Will f/u before/after surgery as needed.     Communicated nutrition plan with bariatric team.     SESSION TIME:  15 minutes

## 2024-12-07 ENCOUNTER — TELEPHONE (OUTPATIENT)
Dept: BARIATRICS | Facility: CLINIC | Age: 29
End: 2024-12-07
Payer: COMMERCIAL

## 2024-12-07 NOTE — TELEPHONE ENCOUNTER
Spoke with patient to confirm that she will have her new insurance in affect on 1/1/25. Picked her tentative surgery date for a Bypass with Dr. Betancourt on 2/07/25 and placed on his surgery calendar.

## 2024-12-10 ENCOUNTER — PATIENT MESSAGE (OUTPATIENT)
Dept: BARIATRICS | Facility: CLINIC | Age: 29
End: 2024-12-10
Payer: COMMERCIAL

## 2024-12-18 DIAGNOSIS — E11.9 TYPE 2 DIABETES MELLITUS WITHOUT COMPLICATION: ICD-10-CM

## 2025-01-06 ENCOUNTER — TELEPHONE (OUTPATIENT)
Dept: BARIATRICS | Facility: CLINIC | Age: 30
End: 2025-01-06
Payer: COMMERCIAL

## 2025-01-06 ENCOUNTER — CLINICAL SUPPORT (OUTPATIENT)
Dept: BARIATRICS | Facility: CLINIC | Age: 30
End: 2025-01-06
Payer: COMMERCIAL

## 2025-01-06 ENCOUNTER — PATIENT MESSAGE (OUTPATIENT)
Dept: BARIATRICS | Facility: CLINIC | Age: 30
End: 2025-01-06

## 2025-01-06 DIAGNOSIS — E66.01 MORBID OBESITY WITH BMI OF 40.0-44.9, ADULT: ICD-10-CM

## 2025-01-06 DIAGNOSIS — E11.69 TYPE 2 DIABETES MELLITUS WITH OTHER SPECIFIED COMPLICATION, WITHOUT LONG-TERM CURRENT USE OF INSULIN: ICD-10-CM

## 2025-01-06 DIAGNOSIS — Z71.3 DIETARY COUNSELING: Primary | ICD-10-CM

## 2025-01-06 NOTE — PROGRESS NOTES
The patient location is: [place] ([place])  The chief complaint leading to consultation is: morbid obesity    Visit type: audiovisual    Face to Face time with patient: 15 min  15 minutes of total time spent on the encounter, which includes face to face time and non-face to face time preparing to see the patient (eg, review of tests), Obtaining and/or reviewing separately obtained history, Documenting clinical information in the electronic or other health record, Independently interpreting results (not separately reported) and communicating results to the patient/family/caregiver, or Care coordination (not separately reported).         Each patient to whom he or she provides medical services by telemedicine is:  (1) informed of the relationship between the physician and patient and the respective role of any other health care provider with respect to management of the patient; and (2) notified that he or she may decline to receive medical services by telemedicine and may withdraw from such care at any time.        NUTRITIONAL Note     Referring Physician: Yue Betancourt M.D.   Reason for MNT Referral: Follow up assessment for laparoscopic Prasanth-en-Y work-up     29 y.o. female presents for follow up with self reported 5 lbs weight loss over the past month. States she has continued making dietary and lifestyle changes in preparation for bariatric surgery. Doing meal prep on Sundays which helps stay on track throughout the week.              Past Medical History:   Diagnosis Date    Diabetes mellitus, type 2      Morbid obesity with BMI of 40.0-44.9, adult      Obesity        CLINICAL DATA:  29 y.o.-year-old Black or  female.  Height: 5 ft.  Weight: 233 lbs  IBW: 126 lbs  BMI: 44     DAILY NUTRITIONAL NEEDS: pre-op nutritional bariatric guidelines to promote weight loss  6446-8876 Calories    Grams Protein     NUTRITION & HEALTH HISTORY:  Greatest challenge: dining out frequency, starchy CHO, and irregular  meal patterns     Current diet recall:     B: 2 eggs   L: baked, chicken breast, salmon or tilapia over spinach leaf salad  - sunflower seeds or pretzel thins  D: baked, chicken breast, salmon or tilapia over spinach leaf salad     Diet Includes:  Meal pattern: 2-3 meals + 1 protein supplements  Protein supplements: Premier shakes  Snacking: sunflower seeds  Vegetables: Likes a few. Eats almost daily.  Fruits: Likes a variety. Eats rarely.  Beverages: water, CL - trying to increase throughout the day  Dining out: Daily. Weekly. Mostly fast food, restaurants, and take-out.   Cooking at home: Weekly, baked and sauteed, meat and fish, vegetables, (limited starchy CHO).     Exercise:  None lately d/t the Holidays. Boot camp is on a break.  Usual routine. 1-3 times/week - virtual boot camp - a lot of squats, jumping jacks     Restrictions to exercise: time management with second job     Vitamins / Minerals / Herbs:   Mv gummies  Vit D Rx 50,000IU twice weekly - completed     Labs:   Reviewed     Vit D deficiency     Food Allergies:   None known     Social:  Works from home  Lives alone.  Grocery shopping and food prep rarely done.  Patient believes her mom and sister will be supportive after surgery.  Alcohol: Socially.  Smoking: None.     ASSESSMENT:  Patient demonstrated knowledge of healthy eating behaviors and exercise patterns.  Patient demonstrates willingness to change lifestyle and make behavior modifications AEB weight loss, dietary changes, protein supplements.        Barriers to Education: none     Stage of change: action     NUTRITION DIAGNOSIS:    Morbid Obesity related to Excessive carbohydrate intake, Excessive calorie intake, and Physical inactivity as evidence by BMI.     BARIATRIC DIET DISCUSSION/PLAN:  Discussed diet after surgery and related to patient's food record.  Reviewed nutrition guidelines for before and after surgery.  Answered all questions.  Continue to review Bariatric Nutrition Guidebook  at home and call with any questions.  Work on Bariatric Nutrition Checklist.  Work on expanding variety of vegetables.  Continue cutting back on starchy CHO in the diet.  1200-calorie diet.  1500-calorie diet.  Resume exercise routine or try to increase steps/movement throughout the day while at work.      RECOMMENDATIONS:  Pt is a good candidate for bariatric surgery - Gastric Bypass.     Patient verbalized understanding.     Will f/u before/after surgery as needed.     Communicated nutrition plan with bariatric team.     SESSION TIME:  15 minutes

## 2025-01-06 NOTE — TELEPHONE ENCOUNTER
Spoke with patient regarding her new insurance coverage. Will discuss with Kati Velasquez in Pre-Service to have benefits checked/confirmed and get back with patient to solidify a surgery date.

## 2025-01-10 ENCOUNTER — TELEPHONE (OUTPATIENT)
Dept: BARIATRICS | Facility: CLINIC | Age: 30
End: 2025-01-10
Payer: COMMERCIAL

## 2025-01-10 DIAGNOSIS — E11.69 TYPE 2 DIABETES MELLITUS WITH OTHER SPECIFIED COMPLICATION, WITHOUT LONG-TERM CURRENT USE OF INSULIN: ICD-10-CM

## 2025-01-10 DIAGNOSIS — E66.01 MORBID OBESITY WITH BMI OF 40.0-44.9, ADULT: Primary | ICD-10-CM

## 2025-01-10 NOTE — TELEPHONE ENCOUNTER
"Spoke with patient and confirmed the surgical procedure of Laparoscopic Bypass with Dr Mcconnell on 2/13/25.  Scheduled preop appts/surgery date/2 week and 8 week post op appts. All dates and times agreed upon. Pt aware that if they are required to have PCP clearance, it must be within 6 months of surgery, unless their medical history has changed, it should be dated, signed and in chart for preop appointment. All medications have been reviewed regarding the necessity to be crushed or broken into pieces smaller that the tip of a pencil eraser for 2 weeks following gastric sleeve surgery and 4 weeks following gastric bypass surgery. Pt instructed to stop taking all NSAIDS 1 week before surgery and for life after surgery. Pt aware that protein liquid diet start date is 1/29/25. Patient is doing well with their diet. Patient was instructed about the progression of the diet phases. The patient's current weight is 233 lbs, height is 5'1", and BMI is 44. Refer to medical letter of necessity from Surgeon. Discussed the importance of increased physical activity and dieting lifestyle changes to improve weight loss and meet goals. Screened patient for history of UTI per protocol. Discussed with patient to avoid antibiotics and elective procedures involving sedation/anesthesia within 30 days of surgery unless cleared by the bariatric department. Patient instructed to call the bariatric clinic post op for any s/s of UTI. Patient's mailing address confirmed and informed to expect a manilla envelop containing bariatric surgery pre op booklet, appointment reminders, protein and fluid log sheets, and liquid diet and vitamin information sheets. Pt aware that all appts can be seen in my [facility] patient portal at this time. Confirmed email address and informed patient that they will be enrolled in the Patient Reported Outcomes program to track their progress and successes. The first email will be sent 2-3 weeks before surgery and then " every year on your surgery anniversary date. Office phone and fax number given to patient for any future questions/concerns. Discussed the pre-surgery complex carbohydrate beverage to purchase in [facility] pharmacy to drink 30 minutes before the surgery arrival time. Reviewed the policy of scheduling a covid test 72 hours prior to surgery if necessary.

## 2025-01-10 NOTE — LETTER
"  [facility] - Bariatric Surg 2nd Fl  [address]  [facility] [address]  Phone: [phone]  Fax: [phone] January 10, 2025       Attn: Pre-determination Dept.    RE:  Karen Paredes          OC #: [id number]          : 1995    To Whom It May Concern:  I am sending this letter on behalf of Karen Paredes (a 29 y.o. female) for pre-approval for Bariatric Surgery; specifically the Laparoscopic Prasanth-en-Y Gastric Bypass. Karen has a past medical history of Morbid Obesity and Diabetes Mellitus Type II. She has made numerous attempts at dieting and exercise programs, and has failed to maintain any sustained weight loss.  Weight/Height/BMI  Estimated body mass index is 45.15 kg/m² as calculated from the following:    Height as of 24: 5' 1" (1.549 m).    Weight as of 24: 108.4 kg (238 lb 15.7 oz).   Karen Paredes has been evaluated in our bariatric program by myself, the , and the program dietician and is felt to be an excellent candidate for surgery.  In addition, she has undergone a psychological evaluation, from which a letter is enclosed.  Karen Paredes has undergone extensive pre-operative education and understands all the risks, benefits, and possible complications of surgery.  She has also undergone dietary education and thorough nutritional evaluation via a registered dietician.  Our program provides long term nutritional counseling with unlimited consults with the dietician.    Our team is sending this letter to receive pre-approval for the indicated procedure.  Please let us know if you have any questions or require any further information.  Sincerely,         Meenakshi Pickard MD  General, Laparoscopic, and Bariatric Surgery  [facility] - [place], [place]              "

## 2025-01-14 ENCOUNTER — PATIENT MESSAGE (OUTPATIENT)
Dept: BARIATRICS | Facility: CLINIC | Age: 30
End: 2025-01-14
Payer: COMMERCIAL

## 2025-01-29 ENCOUNTER — OFFICE VISIT (OUTPATIENT)
Dept: BARIATRICS | Facility: CLINIC | Age: 30
End: 2025-01-29
Payer: COMMERCIAL

## 2025-01-29 ENCOUNTER — LAB VISIT (OUTPATIENT)
Dept: LAB | Facility: HOSPITAL | Age: 30
End: 2025-01-29
Attending: SURGERY
Payer: COMMERCIAL

## 2025-01-29 VITALS
OXYGEN SATURATION: 100 % | SYSTOLIC BLOOD PRESSURE: 121 MMHG | HEART RATE: 75 BPM | DIASTOLIC BLOOD PRESSURE: 65 MMHG | WEIGHT: 240.5 LBS | BODY MASS INDEX: 45.45 KG/M2

## 2025-01-29 DIAGNOSIS — E11.69 TYPE 2 DIABETES MELLITUS WITH OTHER SPECIFIED COMPLICATION, WITHOUT LONG-TERM CURRENT USE OF INSULIN: ICD-10-CM

## 2025-01-29 DIAGNOSIS — E66.813 CLASS 3 SEVERE OBESITY DUE TO EXCESS CALORIES WITH SERIOUS COMORBIDITY AND BODY MASS INDEX (BMI) OF 45.0 TO 49.9 IN ADULT: Primary | ICD-10-CM

## 2025-01-29 DIAGNOSIS — E66.01 CLASS 3 SEVERE OBESITY DUE TO EXCESS CALORIES WITH SERIOUS COMORBIDITY AND BODY MASS INDEX (BMI) OF 45.0 TO 49.9 IN ADULT: Primary | ICD-10-CM

## 2025-01-29 DIAGNOSIS — Z98.84 S/P BARIATRIC SURGERY: ICD-10-CM

## 2025-01-29 DIAGNOSIS — E66.01 MORBID OBESITY WITH BMI OF 40.0-44.9, ADULT: ICD-10-CM

## 2025-01-29 DIAGNOSIS — E55.9 VITAMIN D INSUFFICIENCY: ICD-10-CM

## 2025-01-29 PROBLEM — J03.90 TONSILLITIS: Status: RESOLVED | Noted: 2020-10-01 | Resolved: 2025-01-29

## 2025-01-29 PROBLEM — Z30.46 ENCOUNTER FOR SURVEILLANCE OF IMPLANTABLE SUBDERMAL CONTRACEPTIVE: Status: RESOLVED | Noted: 2023-01-30 | Resolved: 2025-01-29

## 2025-01-29 LAB
25(OH)D3+25(OH)D2 SERPL-MCNC: 18 NG/ML (ref 30–96)
ALBUMIN SERPL BCP-MCNC: 3.9 G/DL (ref 3.5–5.2)
ALP SERPL-CCNC: 67 U/L (ref 40–150)
ALT SERPL W/O P-5'-P-CCNC: 16 U/L (ref 10–44)
ANION GAP SERPL CALC-SCNC: 6 MMOL/L (ref 8–16)
AST SERPL-CCNC: 11 U/L (ref 10–40)
BASOPHILS # BLD AUTO: 0.04 K/UL (ref 0–0.2)
BASOPHILS NFR BLD: 0.5 % (ref 0–1.9)
BILIRUB SERPL-MCNC: 0.3 MG/DL (ref 0.1–1)
BUN SERPL-MCNC: 9 MG/DL (ref 6–20)
CALCIUM SERPL-MCNC: 9.4 MG/DL (ref 8.7–10.5)
CHLORIDE SERPL-SCNC: 103 MMOL/L (ref 95–110)
CO2 SERPL-SCNC: 27 MMOL/L (ref 23–29)
CREAT SERPL-MCNC: 0.8 MG/DL (ref 0.5–1.4)
DIFFERENTIAL METHOD BLD: ABNORMAL
EOSINOPHIL # BLD AUTO: 0.2 K/UL (ref 0–0.5)
EOSINOPHIL NFR BLD: 2.1 % (ref 0–8)
ERYTHROCYTE [DISTWIDTH] IN BLOOD BY AUTOMATED COUNT: 14.1 % (ref 11.5–14.5)
EST. GFR  (NO RACE VARIABLE): >60 ML/MIN/1.73 M^2
GLUCOSE SERPL-MCNC: 156 MG/DL (ref 70–110)
HCT VFR BLD AUTO: 38.2 % (ref 37–48.5)
HGB BLD-MCNC: 12 G/DL (ref 12–16)
IMM GRANULOCYTES # BLD AUTO: 0.03 K/UL (ref 0–0.04)
IMM GRANULOCYTES NFR BLD AUTO: 0.4 % (ref 0–0.5)
LYMPHOCYTES # BLD AUTO: 1.8 K/UL (ref 1–4.8)
LYMPHOCYTES NFR BLD: 23.6 % (ref 18–48)
MCH RBC QN AUTO: 25.1 PG (ref 27–31)
MCHC RBC AUTO-ENTMCNC: 31.4 G/DL (ref 32–36)
MCV RBC AUTO: 80 FL (ref 82–98)
MONOCYTES # BLD AUTO: 0.6 K/UL (ref 0.3–1)
MONOCYTES NFR BLD: 8.4 % (ref 4–15)
NEUTROPHILS # BLD AUTO: 4.9 K/UL (ref 1.8–7.7)
NEUTROPHILS NFR BLD: 65 % (ref 38–73)
NRBC BLD-RTO: 0 /100 WBC
PLATELET # BLD AUTO: 362 K/UL (ref 150–450)
PMV BLD AUTO: 10.1 FL (ref 9.2–12.9)
POTASSIUM SERPL-SCNC: 4.1 MMOL/L (ref 3.5–5.1)
PROT SERPL-MCNC: 8.2 G/DL (ref 6–8.4)
RBC # BLD AUTO: 4.78 M/UL (ref 4–5.4)
SODIUM SERPL-SCNC: 136 MMOL/L (ref 136–145)
WBC # BLD AUTO: 7.6 K/UL (ref 3.9–12.7)

## 2025-01-29 PROCEDURE — 99999 PR PBB SHADOW E&M-EST. PATIENT-LVL III: CPT | Mod: PBBFAC,,, | Performed by: SURGERY

## 2025-01-29 PROCEDURE — 3074F SYST BP LT 130 MM HG: CPT | Mod: CPTII,S$GLB,, | Performed by: SURGERY

## 2025-01-29 PROCEDURE — 1160F RVW MEDS BY RX/DR IN RCRD: CPT | Mod: CPTII,S$GLB,, | Performed by: SURGERY

## 2025-01-29 PROCEDURE — 99215 OFFICE O/P EST HI 40 MIN: CPT | Mod: S$GLB,,, | Performed by: SURGERY

## 2025-01-29 PROCEDURE — 36415 COLL VENOUS BLD VENIPUNCTURE: CPT | Performed by: SURGERY

## 2025-01-29 PROCEDURE — 3008F BODY MASS INDEX DOCD: CPT | Mod: CPTII,S$GLB,, | Performed by: SURGERY

## 2025-01-29 PROCEDURE — 3078F DIAST BP <80 MM HG: CPT | Mod: CPTII,S$GLB,, | Performed by: SURGERY

## 2025-01-29 PROCEDURE — 80053 COMPREHEN METABOLIC PANEL: CPT | Performed by: SURGERY

## 2025-01-29 PROCEDURE — 85025 COMPLETE CBC W/AUTO DIFF WBC: CPT | Performed by: SURGERY

## 2025-01-29 PROCEDURE — 82306 VITAMIN D 25 HYDROXY: CPT | Performed by: SURGERY

## 2025-01-29 PROCEDURE — 1159F MED LIST DOCD IN RCRD: CPT | Mod: CPTII,S$GLB,, | Performed by: SURGERY

## 2025-01-29 RX ORDER — GABAPENTIN 300 MG/1
300 CAPSULE ORAL 2 TIMES DAILY
Qty: 10 CAPSULE | Refills: 0 | Status: SHIPPED | OUTPATIENT
Start: 2025-01-29 | End: 2025-02-10

## 2025-01-29 RX ORDER — TRETINOIN 0.5 MG/G
CREAM TOPICAL NIGHTLY
COMMUNITY
Start: 2024-12-23

## 2025-01-29 RX ORDER — HYDROQUINONE 40 MG/G
CREAM TOPICAL
COMMUNITY
Start: 2024-12-23

## 2025-01-29 RX ORDER — AMMONIUM LACTATE 12 G/100G
LOTION TOPICAL 2 TIMES DAILY
COMMUNITY
Start: 2024-12-23

## 2025-01-29 RX ORDER — SODIUM FLUORIDE/POTASSIUM NIT 1.1 %-5 %
PASTE (ML) DENTAL
COMMUNITY
Start: 2025-01-28

## 2025-01-30 ENCOUNTER — CLINICAL SUPPORT (OUTPATIENT)
Dept: BARIATRICS | Facility: CLINIC | Age: 30
End: 2025-01-30
Payer: COMMERCIAL

## 2025-01-30 DIAGNOSIS — Z71.3 DIETARY COUNSELING AND SURVEILLANCE: Primary | ICD-10-CM

## 2025-01-30 PROCEDURE — 99499 UNLISTED E&M SERVICE: CPT | Mod: 93,,, | Performed by: DIETITIAN, REGISTERED

## 2025-01-30 NOTE — PROGRESS NOTES
Audio Only Telehealth Visit     The patient location is: home ([place])  The chief complaint leading to consultation is: pre op  Visit type: Virtual visit with audio only (telephone)  Total time spent in medical discussion with patient: 10 minutes  Total time spent on date of the encounter: 15 minutes       The reason for the audio only service rather than synchronous audio and video virtual visit was related to technical difficulties or patient preference/necessity.       Each patient to whom I provide medical services by telemedicine is:  (1) informed of the relationship between the physician and patient and the respective role of any other health care provider with respect to management of the patient; and (2) notified that they may decline to receive medical services by telemedicine and may withdraw from such care at any time. Patient verbally consented to receive this service via voice-only telephone call.       NUTRITION NOTE    Bariatric Surgeon: Yue Betancourt M.D.  Reason for MNT Referral: Pre-op liquid diet and nutrition instructions  Bypass  Date of Surgery 2/13/25    Pre-op liquid diet  Pt using Premier shakes, Protein 2.0 for preop liquid diet    Discussion:  -  gms of protein per day  - 600-800 calories per day   - Less than 4gm sugar per shake  - SF, Decaf, non-carbonated Fluids  - No Fruits, juices, yogurt or pudding on liquid diet  - No vitamins for 1 week prior to surgery  - No herbal supplements including green tea and fish oils for 2 weeks prior to surgery    Remind pt per nursing and medical team to inform our department if taking antibiotics within the 30 days post bariatric surgery or it any other surgeries/procedures are scheduled within 30 days after bariatric surgery.    2 week post-op instructions  Reviewed nutritional guidelines for protein and fluid requirements for week 1 and week 2 post-surgery.  Handout provided to log protein and fluid daily.  1 ounce medicine cups provided for  patient to measure fluid intake after surgery.    Pt plans to purchase the following vitamins and minerals to start taking 1 week after surgery  Multivitamin with 18 mg iron take one tablet or chewable twice a day  B-complex with 50 mg thiamin taken once daily  Calcium citrate 500 mg with vitamin D three times per day  Vitamin B-12 500 mcg  Sublingually daily  Reviewed dosage and timing of vitamin/mineral guidelines.    Reviewed common nutritional concerns and prevention tips after bariatric surgery.  Reminded not to lift anything greater than 10 lbs for 6 week post-surgery  Pt verbalized understanding of information provided with appropriate questions and comments.         SESSION TIME: 15 minutes                          This service was not originating from a related E/M service provided within the previous 7 days nor will  to an E/M service or procedure within the next 24 hours or my soonest available appointment.  Prevailing standard of care was able to be met in this audio-only visit.

## 2025-01-31 RX ORDER — ERGOCALCIFEROL 1.25 MG/1
50000 CAPSULE ORAL
Qty: 12 CAPSULE | Refills: 0 | Status: SHIPPED | OUTPATIENT
Start: 2025-01-31 | End: 2025-04-19

## 2025-02-03 ENCOUNTER — TELEPHONE (OUTPATIENT)
Dept: BARIATRICS | Facility: CLINIC | Age: 30
End: 2025-02-03
Payer: COMMERCIAL

## 2025-02-03 NOTE — TELEPHONE ENCOUNTER
Spoke to patient who stated that, after thinking about the possible complications, she has decided not to have surgery.

## 2025-02-04 ENCOUNTER — TELEPHONE (OUTPATIENT)
Dept: BARIATRICS | Facility: CLINIC | Age: 30
End: 2025-02-04
Payer: COMMERCIAL

## 2025-02-04 NOTE — TELEPHONE ENCOUNTER
----- Message from Génesis Ortiz NP sent at 2/3/2025  9:05 AM CST -----  Regarding: FW: Patient advice  Contact: Pt  [phone]    ----- Message -----  From: Yuly Martin  Sent: 2/1/2025  11:42 AM CST  To: Angel Capps Staff  Subject: Patient advice                                             Name of Caller:   Karen      Contact Preference: [phone]    Nature of Call: Called to give update that she no longer was to proceed with the procedure  
Discussed with Caroline Noe RN who had followed up on this.   
[id number]

## 2025-02-10 NOTE — PROGRESS NOTES
History & Physical    SUBJECTIVE:     History of Present Illness:  Karen Paredes is a 29 year-old morbidly obese female with BMI 45.45 kg/m² and multiple associated comorbidities including NIDDM2, who presents for pre-operative exam for weight loss surgery. She has failed multiple attempts at non-surgical methods of weight loss and has completed the [facility] [place] [facility] program which she started on 6/25/24 at which time her BMI was 45.90 kg/m². She meets NIH consensus criteria for bariatric surgery and is interested in undergoing laparoscopic savanna-en-y gastric bypass.      Labs 8/2/24: Hgb 11.7, HbA1c 6.2%, Vit D 13     Clearances:  Psych: [place] 7/26/24    Chief Complaint   Patient presents with    Pre-op Exam     RYGB 2/13/25     Review of patient's allergies indicates:  No Known Allergies    Current Outpatient Medications   Medication Sig    ammonium lactate (LAC-HYDRIN) 12 % lotion Apply topically 2 (two) times daily.    etonogestrel (NEXPLANON SDRM) by Subdermal route.    hydroquinone 4 % Crea Apply topically.    PREVIDENT 5000 SENSITIVE 1.1-5 % Pste     tretinoin (RETIN-A) 0.05 % cream Apply topically every evening.    valACYclovir (VALTREX) 500 MG tablet Take 500 mg by mouth 2 (two) times daily.    blood-glucose meter Misc     ergocalciferol (ERGOCALCIFEROL) 50,000 unit Cap Take 1 capsule (50,000 Units total) by mouth every 7 days. for 12 doses     No current facility-administered medications for this visit.     Past Medical History:   Diagnosis Date    Diabetes mellitus, type 2     Hemorrhoids     Morbid obesity with BMI of 40.0-44.9, adult     Tonsillitis 10/01/2020     Past Surgical History:   Procedure Laterality Date    TONSILLECTOMY Bilateral 10/01/2020    Procedure: TONSILLECTOMY;  Surgeon: Meenakshi Cannon MD;  Location: [facility] OR;  Service: ENT;  Laterality: Bilateral;  RN PREOP 9/25/2020   T/S---COVID -NEGATIVE ON 9/30  UPT     Review of Systems   Constitutional:  Negative for chills,  diaphoresis, fever and malaise/fatigue.   HENT:  Negative for congestion, hearing loss and sore throat.    Eyes:  Negative for blurred vision and double vision.   Respiratory:  Negative for cough and shortness of breath.    Cardiovascular:  Negative for chest pain, palpitations, orthopnea, claudication, leg swelling and PND.   Gastrointestinal:  Negative for abdominal pain, blood in stool, constipation, diarrhea, heartburn, nausea and vomiting.   Genitourinary:  Negative for dysuria and urgency.   Musculoskeletal:  Negative for back pain, falls, joint pain, myalgias and neck pain.   Skin:  Negative for itching and rash.   Neurological:  Negative for weakness and headaches.   Endo/Heme/Allergies:  Does not bruise/bleed easily.   Psychiatric/Behavioral:  Negative for depression and substance abuse.      OBJECTIVE:     Vitals:    01/29/25 0941   BP: 121/65   Pulse: 75   SpO2: 100%   Weight: 109.1 kg (240 lb 8.4 oz)     Physical Exam  Vitals reviewed.   Constitutional:       General: She is not in acute distress.     Appearance: Normal appearance. She is obese.   HENT:      Head: Normocephalic and atraumatic.   Eyes:      Conjunctiva/sclera: Conjunctivae normal.   Neck:      Thyroid: No thyromegaly.   Cardiovascular:      Rate and Rhythm: Normal rate and regular rhythm.      Heart sounds: Normal heart sounds.   Pulmonary:      Effort: Pulmonary effort is normal. No respiratory distress.      Breath sounds: Normal breath sounds.   Abdominal:      General: There is no distension.      Palpations: Abdomen is soft.      Tenderness: There is no abdominal tenderness. There is no guarding or rebound.   Musculoskeletal:         General: Normal range of motion.      Cervical back: Normal range of motion and neck supple.      Right lower leg: No edema.      Left lower leg: No edema.   Skin:     General: Skin is warm and dry.      Findings: No rash.   Neurological:      General: No focal deficit present.      Mental Status: She is  alert and oriented to person, place, and time.      Gait: Gait is intact.   Psychiatric:         Mood and Affect: Mood and affect normal.         Behavior: Behavior normal.         Cognition and Memory: Memory normal.        Laboratory  CBC: Reviewed  BMP: Reviewed  LFTs: Reviewed  Bariatric Labs: Reviewed    Diagnostic Results:  Labs: Reviewed  ECG: Reviewed  X-Ray: Reviewed  Upper GI: Reviewed     Dietitian: Patient has participated in pre-operative nutritional program with understanding of necessary lifelong dietary changes required with surgery.    Psych: No overt contraindications to bariatric surgery, patient has completed psychological evaluation and is able to give informed consent.    PCP: Medically cleared for surgery.     ASSESSMENT/PLAN:     Morbid obesity with failure of medical conservative therapy.    Co Morbid Conditions:   Patient Active Problem List   Diagnosis    Class 3 severe obesity due to excess calories with serious comorbidity and body mass index (BMI) of 45.0 to 49.9 in adult    Type 2 diabetes mellitus with other specified complication, without long-term current use of insulin     Patient wishes to undergo laparoscopic savanna-en-y gastric bypass. She is scheduled for 2/13/25. She will start the pre-op liquid diet tomorrow on 1/30/25.     The patient was informed that risks of bariatric surgery include, but are not limited to: bleeding, infection, injury to intraabdominal structures such as bowel, stomach, esophagus, liver, and spleen; DVT/PE, cardiopulmonary complications such as MI, stroke or PNA; marginal ulcers which can cause pain, bleed or perforate, stricture requiring dilations, incisional hernia, gallstones, exacerbation of mood disorders, vitamin/mineral deficiencies, dumping syndrome, failure of weight loss or weight regain, possible conversion to an open operation, and anastomotic or staple-line leak which may require surgical or endoscopic interventions, drains, antibiotics and  NPO status with IV nutrition. Risks of general anesthesia with endotracheal intubation including but are not limited to heart attack, stroke, inability to wean off ventilator, and neurologic disability. We discussed increased risk of marginal ulcer in the setting of smoking, NSAID use, and immunosuppression including steroids.      We discussed that our goal is to ameliorate her medical problems and not to obtain a specific body mass index. All questions were answered to her satisfaction and she has elected to proceed with surgery. Consent for surgery and blood transfusion were signed. Prescriptions for ondansetron, omeprazole, ursodiol, and acetaminophen were sent to the pharmacy for bedside delivery. Perioperative gabapentin was sent to the pharmacy for pick-up. Patient discussed perioperative instructions with the Bariatric RN.     Meenakshi Mcconnell  1/29/25

## 2025-03-05 ENCOUNTER — TELEPHONE (OUTPATIENT)
Dept: INTERNAL MEDICINE | Facility: CLINIC | Age: 30
End: 2025-03-05
Payer: COMMERCIAL

## 2025-03-05 NOTE — TELEPHONE ENCOUNTER
Called and spoke with pt , pt stated that she will not be able to do blood work today and confirmed appt tomorrow for 8am. Informed pt if appt needs to be reschedule she will get a call back but otherwise blood work will be done after visit pt verbalize understands

## 2025-03-06 ENCOUNTER — OFFICE VISIT (OUTPATIENT)
Dept: INTERNAL MEDICINE | Facility: CLINIC | Age: 30
End: 2025-03-06
Payer: COMMERCIAL

## 2025-03-06 ENCOUNTER — LAB VISIT (OUTPATIENT)
Dept: LAB | Facility: HOSPITAL | Age: 30
End: 2025-03-06
Attending: INTERNAL MEDICINE
Payer: COMMERCIAL

## 2025-03-06 VITALS
WEIGHT: 244.69 LBS | HEIGHT: 61 IN | DIASTOLIC BLOOD PRESSURE: 82 MMHG | SYSTOLIC BLOOD PRESSURE: 120 MMHG | OXYGEN SATURATION: 98 % | HEART RATE: 91 BPM | BODY MASS INDEX: 46.2 KG/M2

## 2025-03-06 DIAGNOSIS — E11.69 TYPE 2 DIABETES MELLITUS WITH OTHER SPECIFIED COMPLICATION, WITHOUT LONG-TERM CURRENT USE OF INSULIN: Primary | ICD-10-CM

## 2025-03-06 DIAGNOSIS — E11.69 TYPE 2 DIABETES MELLITUS WITH OTHER SPECIFIED COMPLICATION, WITHOUT LONG-TERM CURRENT USE OF INSULIN: ICD-10-CM

## 2025-03-06 LAB
ALBUMIN SERPL BCP-MCNC: 3.8 G/DL (ref 3.5–5.2)
ALP SERPL-CCNC: 63 U/L (ref 40–150)
ALT SERPL W/O P-5'-P-CCNC: 13 U/L (ref 10–44)
ANION GAP SERPL CALC-SCNC: 6 MMOL/L (ref 8–16)
AST SERPL-CCNC: 23 U/L (ref 10–40)
BILIRUB DIRECT SERPL-MCNC: 0.2 MG/DL (ref 0.1–0.3)
BILIRUB SERPL-MCNC: 0.3 MG/DL (ref 0.1–1)
BUN SERPL-MCNC: 10 MG/DL (ref 6–20)
CALCIUM SERPL-MCNC: 9.1 MG/DL (ref 8.7–10.5)
CHLORIDE SERPL-SCNC: 105 MMOL/L (ref 95–110)
CHOLEST SERPL-MCNC: 127 MG/DL (ref 120–199)
CHOLEST/HDLC SERPL: 3.1 {RATIO} (ref 2–5)
CO2 SERPL-SCNC: 25 MMOL/L (ref 23–29)
CREAT SERPL-MCNC: 0.7 MG/DL (ref 0.5–1.4)
EST. GFR  (NO RACE VARIABLE): >60 ML/MIN/1.73 M^2
ESTIMATED AVG GLUCOSE: 157 MG/DL (ref 68–131)
GLUCOSE SERPL-MCNC: 154 MG/DL (ref 70–110)
HBA1C MFR BLD: 7.1 % (ref 4–5.6)
HDLC SERPL-MCNC: 41 MG/DL (ref 40–75)
HDLC SERPL: 32.3 % (ref 20–50)
LDLC SERPL CALC-MCNC: 76.6 MG/DL (ref 63–159)
NONHDLC SERPL-MCNC: 86 MG/DL
POTASSIUM SERPL-SCNC: 4.3 MMOL/L (ref 3.5–5.1)
PROT SERPL-MCNC: 7.6 G/DL (ref 6–8.4)
SODIUM SERPL-SCNC: 136 MMOL/L (ref 136–145)
TRIGL SERPL-MCNC: 47 MG/DL (ref 30–150)

## 2025-03-06 PROCEDURE — 83036 HEMOGLOBIN GLYCOSYLATED A1C: CPT | Performed by: INTERNAL MEDICINE

## 2025-03-06 PROCEDURE — G2211 COMPLEX E/M VISIT ADD ON: HCPCS | Mod: S$GLB,,, | Performed by: INTERNAL MEDICINE

## 2025-03-06 PROCEDURE — 80061 LIPID PANEL: CPT | Performed by: INTERNAL MEDICINE

## 2025-03-06 PROCEDURE — 99999 PR PBB SHADOW E&M-EST. PATIENT-LVL IV: CPT | Mod: PBBFAC,,, | Performed by: INTERNAL MEDICINE

## 2025-03-06 PROCEDURE — 80048 BASIC METABOLIC PNL TOTAL CA: CPT | Performed by: INTERNAL MEDICINE

## 2025-03-06 PROCEDURE — 3079F DIAST BP 80-89 MM HG: CPT | Mod: CPTII,S$GLB,, | Performed by: INTERNAL MEDICINE

## 2025-03-06 PROCEDURE — 3074F SYST BP LT 130 MM HG: CPT | Mod: CPTII,S$GLB,, | Performed by: INTERNAL MEDICINE

## 2025-03-06 PROCEDURE — 99214 OFFICE O/P EST MOD 30 MIN: CPT | Mod: S$GLB,,, | Performed by: INTERNAL MEDICINE

## 2025-03-06 PROCEDURE — 3008F BODY MASS INDEX DOCD: CPT | Mod: CPTII,S$GLB,, | Performed by: INTERNAL MEDICINE

## 2025-03-06 PROCEDURE — 1160F RVW MEDS BY RX/DR IN RCRD: CPT | Mod: CPTII,S$GLB,, | Performed by: INTERNAL MEDICINE

## 2025-03-06 PROCEDURE — 80076 HEPATIC FUNCTION PANEL: CPT | Performed by: INTERNAL MEDICINE

## 2025-03-06 PROCEDURE — 1159F MED LIST DOCD IN RCRD: CPT | Mod: CPTII,S$GLB,, | Performed by: INTERNAL MEDICINE

## 2025-03-06 RX ORDER — SEMAGLUTIDE 1 MG/.5ML
1 INJECTION, SOLUTION SUBCUTANEOUS WEEKLY
Qty: 2 ML | Refills: 0 | Status: CANCELLED | OUTPATIENT
Start: 2025-05-07 | End: 2025-06-06

## 2025-03-06 RX ORDER — TIRZEPATIDE 2.5 MG/.5ML
2.5 INJECTION, SOLUTION SUBCUTANEOUS
Qty: 2 ML | Refills: 0 | Status: SHIPPED | OUTPATIENT
Start: 2025-03-06 | End: 2025-04-05

## 2025-03-06 RX ORDER — SEMAGLUTIDE 0.5 MG/.5ML
0.5 INJECTION, SOLUTION SUBCUTANEOUS WEEKLY
Qty: 2 ML | Refills: 0 | Status: CANCELLED | OUTPATIENT
Start: 2025-04-06 | End: 2025-05-06

## 2025-03-06 RX ORDER — SEMAGLUTIDE 1.7 MG/.75ML
1.7 INJECTION, SOLUTION SUBCUTANEOUS WEEKLY
Qty: 3 ML | Refills: 0 | Status: CANCELLED | OUTPATIENT
Start: 2025-06-07 | End: 2025-07-07

## 2025-03-06 RX ORDER — PEN NEEDLE, DIABETIC 30 GX3/16"
1 NEEDLE, DISPOSABLE MISCELLANEOUS DAILY
Qty: 100 EACH | Refills: 1 | Status: SHIPPED | OUTPATIENT
Start: 2025-03-06

## 2025-03-06 RX ORDER — TIRZEPATIDE 5 MG/.5ML
5 INJECTION, SOLUTION SUBCUTANEOUS
Qty: 2 ML | Refills: 0 | Status: SHIPPED | OUTPATIENT
Start: 2025-04-06 | End: 2025-05-06

## 2025-03-06 RX ORDER — SEMAGLUTIDE 0.25 MG/.5ML
0.25 INJECTION, SOLUTION SUBCUTANEOUS WEEKLY
Qty: 2 ML | Refills: 0 | Status: CANCELLED | OUTPATIENT
Start: 2025-03-06 | End: 2025-04-05

## 2025-03-06 NOTE — PROGRESS NOTES
"    Assessment:         1. Type 2 diabetes mellitus with other specified complication, without long-term current use of insulin          Plan:           Karen was seen today for follow-up and diabetes.    Diagnoses and all orders for this visit:    Type 2 diabetes mellitus with other specified complication, without long-term current use of insulin  -     Basic Metabolic Panel; Future  -     Hemoglobin A1C; Future  -     Hepatic Function Panel; Future  -     Microalbumin/Creatinine Ratio, Urine; Future  -     Lipid Panel; Future  -     Ambulatory referral/consult to Ophthalmology; Future  -     pen needle, diabetic (BD ULTRA-FINE JEF PEN NEEDLE) 32 gauge x 5/32" Ndle; 1 each by Misc.(Non-Drug; Combo Route) route once daily.  -     tirzepatide (MOUNJARO) 2.5 mg/0.5 mL PnIj; Inject 2.5 mg into the skin every 7 days.  -     tirzepatide (MOUNJARO) 5 mg/0.5 mL PnIj; Inject 5 mg into the skin every 7 days.  -     tirzepatide 7.5 mg/0.5 mL PnIj; Inject 7.5 mg into the skin every 7 days.  -     tirzepatide 10 mg/0.5 mL PnIj; Inject 10 mg into the skin every 7 days.    Other orders  The following orders have not been finalized:  -     Cancel: semaglutide, weight loss, (WEGOVY) 0.25 mg/0.5 mL PnIj  -     Cancel: semaglutide, weight loss, (WEGOVY) 0.5 mg/0.5 mL PnIj  -     Cancel: semaglutide, weight loss, (WEGOVY) 1 mg/0.5 mL PnIj  -     Cancel: semaglutide, weight loss, (WEGOVY) 1.7 mg/0.75 mL PnIj        Assessment & Plan    IMPRESSION:  Considered switching patient from Ozempic to Mounjaro (GIP-1) due to GI side effects  Reviewed recent labs, noting low vitamin D levels  Assessed patient's recent eye exam and OBGYN visit  Evaluated patient's decision to decline bariatric surgery    PATIENT EDUCATION:  - Discussed vitamin D supplementation and natural sources, including sunlight exposure and foods high in vitamin A for better absorption  - Explained prevalence of low vitamin D levels in general population    ACTION " "ITEMS/LIFESTYLE:  - Recommend increasing sun exposure to improve vitamin D levels    MEDICATIONS:  - Started Mounjaro 2.5 mg injection, to be increased monthly based on symptoms up to 5 mg, 7.5 mg, and 10 mg as tolerated  - Continued vitamin D supplement as prescribed by previous provider    ORDERS:  - Ordered diabetes screening labs  - Ordered lipid panel    FOLLOW UP:  - Follow up in 2 weeks for virtual appointment to review lab results and medication tolerance             Subjective:       Patient ID: Karen Paredes is a 29 y.o. female.    Chief Complaint: Follow-up and Diabetes      Interim Hx  Concerns today  Chronic problems        History of Present Illness    CHIEF COMPLAINT:  Patient presents today for follow up.    WEIGHT MANAGEMENT:  She was previously scheduled for bariatric surgery but cancelled 1-2 weeks prior due to concerns about potential complications. She previously tried Ozempic but discontinued in September due to significant side effects including diarrhea, constipation, and severe nausea.    MEDICAL HISTORY:  She has vitamin D deficiency and is currently taking supplementation based on recent lab results.    GYNECOLOGIC HISTORY:  Her last pap smear in April 2022 was normal. Her current OBGYN is Dr. Bernadine Naranjo at [facility] on [address].    PREVENTIVE CARE:  She had a dilated eye exam in November at [facility], which included retinal exam for diabetes-related changes.           Review of Systems   All other systems reviewed and are negative.          Health Maintenance Due   Topic Date Due    Diabetic Eye Exam  10/07/2022    Influenza Vaccine (1) Never done    COVID-19 Vaccine (3 - 2024-25 season) 09/01/2024    Pap Smear  12/03/2024    Diabetes Urine Screening  12/13/2024    Hemoglobin A1c  02/02/2025         Objective:     /82 (BP Location: Right arm, Patient Position: Sitting)   Pulse 91   Ht 5' 1" (1.549 m)   Wt 111 kg (244 lb 11.4 oz)   SpO2 98%   BMI 46.24 kg/m² " "  .Physical Exam                    3/6/2025     8:09 AM 1/29/2025     9:41 AM 12/6/2024     8:39 AM 11/7/2024     8:53 AM 9/16/2024     8:26 PM   Vitals   Height 5' 1" (1.549 m)    5' 1" (1.549 m)   Weight (lbs) 244.71 240.52 238.98 243.61 237   BMI (kg/m2) 46.3    44.8          Physical Exam  Vitals and nursing note reviewed.   Constitutional:       General: She is not in acute distress.     Appearance: She is well-developed. She is not diaphoretic.   HENT:      Head: Normocephalic.      Nose: Nose normal.   Eyes:      General:         Right eye: No discharge.         Left eye: No discharge.      Conjunctiva/sclera: Conjunctivae normal.      Pupils: Pupils are equal, round, and reactive to light.   Cardiovascular:      Rate and Rhythm: Normal rate and regular rhythm.      Heart sounds: Normal heart sounds. No murmur heard.     No friction rub. No gallop.   Pulmonary:      Effort: Pulmonary effort is normal. No respiratory distress.   Abdominal:      General: Bowel sounds are normal. There is no distension.      Palpations: Abdomen is soft.   Musculoskeletal:         General: No deformity. Normal range of motion.      Cervical back: Normal range of motion.   Skin:     General: Skin is warm.   Neurological:      Mental Status: She is alert and oriented to person, place, and time.      Cranial Nerves: No cranial nerve deficit.           No results found for this or any previous visit (from the past 2 weeks).      Future Appointments   Date Time Provider Department Center   3/19/2025  4:00 PM Kaleb Mchugh III, MD [facility]   5/12/2025  9:20 AM Kaleb Mchugh III, MD [facility]         Medication List with Changes/Refills   New Medications    PEN NEEDLE, DIABETIC (BD ULTRA-FINE JEF PEN NEEDLE) 32 GAUGE X 5/32" NDLE    1 each by Misc.(Non-Drug; Combo Route) route once daily.    TIRZEPATIDE (MOUNJARO) 2.5 MG/0.5 ML PNIJ    Inject 2.5 mg into the skin every 7 days.    TIRZEPATIDE (MOUNJARO) 5 MG/0.5 ML " PNIJ    Inject 5 mg into the skin every 7 days.    TIRZEPATIDE 10 MG/0.5 ML PNIJ    Inject 10 mg into the skin every 7 days.    TIRZEPATIDE 7.5 MG/0.5 ML PNIJ    Inject 7.5 mg into the skin every 7 days.   Current Medications    AMMONIUM LACTATE (LAC-HYDRIN) 12 % LOTION    Apply topically 2 (two) times daily.    BLOOD-GLUCOSE METER MISC        ERGOCALCIFEROL (ERGOCALCIFEROL) 50,000 UNIT CAP    Take 1 capsule (50,000 Units total) by mouth every 7 days. for 12 doses    ETONOGESTREL (NEXPLANON SDRM)    by Subdermal route.    HYDROQUINONE 4 % CREA    Apply topically.    PREVIDENT 5000 SENSITIVE 1.1-5 % PSTE        TRETINOIN (RETIN-A) 0.05 % CREAM    Apply topically every evening.    VALACYCLOVIR (VALTREX) 500 MG TABLET    Take 500 mg by mouth 2 (two) times daily.         Disclaimer:  This note has been generated using voice-recognition software. There may be grammatical or spelling errors that have been missed during proof-reading   This note was generated with the assistance of ambient listening technology. Verbal consent was obtained by the patient and accompanying visitor(s) for the recording of patient appointment to facilitate this note. I attest to having reviewed and edited the generated note for accuracy, though some syntax or spelling errors may persist. Please contact the author of this note for any clarification.

## 2025-03-07 ENCOUNTER — PATIENT OUTREACH (OUTPATIENT)
Dept: ADMINISTRATIVE | Facility: HOSPITAL | Age: 30
End: 2025-03-07
Payer: COMMERCIAL

## 2025-03-07 NOTE — LETTER
AUTHORIZATION FOR RELEASE OF   CONFIDENTIAL INFORMATION    Dear [facility] ,    We are seeing Karen Paredes, date of birth 1995, in the clinic at [facility]. Kaleb Mchugh III, MD is the patient's PCP. Karen Paredes has an outstanding lab/procedure at the time we reviewed her chart. In order to help keep her health information updated, she has authorized us to request the following medical record(s):                                                (X)  DIABETIC EYE EXAM                                             Please fax records to [facility], Norman V. III, MD,     [facility]                                                                   Please Fax to [facility] at [phone]    Thank you for your help, Vanessa Herbert LPN-CCC. If I can be of any assistance you can call at [phone]                    Patient Name: Karen Paredes  : 1995  Patient Phone #: [phone]                    Karen Paredes  MRN: [id number]  : 1995  Age: 29 y.o.  Sex: female         Patient/Legal Guardian Signature  This signature was collected at 2025           _______________________________   Printed Name/Relationship to Patient      Consent for Examination and Treatment: I hereby authorize the providers and employees of [facility] ([facility]) to provide medical treatment/services which includes, but is not limited to, performing and administering tests and diagnostic procedures that are deemed necessary, including, but not limited to, imaging examinations, blood tests and other laboratory procedures as may be required by the hospital, clinic, or may be ordered by my physician(s) or persons working under the general and/or special instructions of my physician(s).      I understand and agree that this consent covers all authorized persons, including but not limited to physicians, residents, nurse practitioners, physicians' assistants,  specialists, consultants, student nurses, and independently contracted physicians, who are called upon by the physician in charge, to carry out the diagnostic procedures and medical or surgical treatment.     I hereby authorize [facility] to retain or dispose of any specimens or tissue, should there be such remaining from any test or procedure.     I hereby authorize and give consent for [facility] providers and employees to take photographs, images or videotapes of such diagnostic, surgical or treatment procedures of Patient as may be required by [facility] or as may be ordered by a physician. I further acknowledge and agree that [facility] may use cameras or other devices for patient monitoring.     I am aware that the practice of medicine is not an exact science, and I acknowledge that no guarantees have been made to me as to the outcome of any tests, procedures or treatment.     Authorization for Release of Information: I understand that my insurance company and/or their agents may need information necessary to make determinations about payment/reimbursement. I hereby provide authorization to release to all insurance companies, their successors, assignees, other parties with whom they may have contracted, or others acting on their behalf, that are involved with payment for any hospital and/or clinic charges incurred by the patient, any information that they request and deem necessary for payment/reimbursement, and/or quality review.  I further authorize the release of my health information to physicians or other health care practitioners on staff who are involved in my health care now and in the future, and to other health care providers, entities, or institutions for the purpose of my continued care and treatment, including referrals.     REGISTRATION AUTHORIZATION  Form No. 81173 (Rev. 3/25/2024)    Page 1 of 3                       Medicare Patient's Certification and Authorization to Release Information and Payment  Request:  I certify that the information given by me in applying for payment under Title XVIII of the Social Security Act is correct. I authorize any acndelaria of medical or other information about me to release to the Social SecurityAdministration, or its intermediaries or carriers, any information needed for this or a related Medicare claim. I request that payment of authorized benefits be made on my behalf.     Assignment of Insurance Benefits:   I hereby authorize any and all insurance companies, health plans, defined   benefit plans, health insurers or any entity that is or may be responsible for payment of my medical expenses to pay all hospital and medical benefits now due, and to become due and payable to me under any hospital benefits, sick benefits, injury benefits or any other benefit for services rendered to me, including Major Medical Benefits, direct to [facility] and all independently contracted physicians. I assign any and all rights that I may have against any and all insurance companies, health plans, defined benefit plans, health insurers or any entity that is or may be responsible for payment of my medical expenses, including, but not limited to any right to appeal a denial of a claim, any right to bring any action, lawsuit, administrative proceeding, or other cause of action on my behalf. I specifically assign my right to pursue litigation against any and all insurance companies, health plans, defined benefit plans, health insurers or any entity that is or may be responsible for payment of my medical expenses based upon a refusal to pay charges.            E. Valuables: It is understood and agreed that [facility] is not liable for the damage to or loss of any money, jewelry,   documents, dentures, eye glasses, hearing aids, prosthetics, or other property of value.     F. Computer Equipment: I understand and agree that should I choose to use computer equipment owned by [facility] or if I choose to access the  Internet via [facility]s network, I do so at my own risk. [facility] is not responsible for any damage to my computer equipment or to any damages of any type that might arise from my loss of equipment or data.     G. Acceptance of Financial Responsibility:  I agree that in consideration of the services and   supplies that have been   or will be furnished to the patient, I am hereby obligated to pay all charges made for or on the account of the patient according to the standard rates (in effect at the time the services and supplies are delivered) established by [facility], including its Patient Financial Assistance Policy to the extent it is applicable. I understand that I am responsible for all charges, or portions thereof, not covered by insurance or other sources. Patient refunds will be distributed only after balances at all [facility] facilities are paid.     H. Communication Authorization:  I hereby authorize [facility] and its representatives, along with any billing service   or  who may work on their behalf, to contact me on   my cell phone and/or home phone using pre- recorded messages, artificial voice messages, automatic telephone dialing devices or other computer assisted technology, or by electronic      mail, text messaging, or by any other form of electronic communication. This includes, but is not limited to, appointment reminders, yearly physical exam reminders, preventive care reminders, patient campaigns, welcome calls, and calls about account balances on my account or any account on which I am listed as a guarantor. I understand I have the right to opt out of these communications at any time.      Relationship  Between  Facility and  Provider:      I understand that some, but not all, providers furnishing services to the patient are not employees or agents of [facility]. The patient is under the care and supervision of his/her attending physician, and it is the responsibility of the facility and  its nursing staff to carry out the instructions of such physicians. It is the responsibility of the patient's physician/designee to obtain the patient's informed consent, when required, for medical or surgical treatment, special diagnostic or therapeutic procedures, or hospital services rendered for the patient under the special instructions of the physician/designee.           REGISTRATION AUTHORIZATION  Form No. 47829 (Rev. 3/25/2024)    Page 2 of 3                       Immunizations: [facility] shares immunization information with state sponsored health departments to help you and your doctor keep track of your immunization records. By signing, you consent to have this information shared with the health department in your state:                                [place] - LINKS ([place] Immunization Network for Kids Statewide)                                [place] - MIIX ([place] Immunization Information eXchange)                                [place] - ImmPRINT (Immunization Patient Registry with Integrated Technology)     TERM: This authorization is valid for this and subsequent care/treatment I receive at [facility] and will remain valid unless/until revoked in writing by me.     [facility]: As used in this document, [facility] means all [facility] owned and managed facilities, including, but not limited to, all health centers, surgery centers, clinics, urgent care centers, and hospitals.         [facility] complies with applicable Federal civil rights laws and does not discriminate on the basis of race, color, national origin, age, disability, or sex.  ATENCIÓN: si habla español, tiene a bradshaw disposición servicios gratuitos de asistencia lingüística. Wendi demarco [phone].  VIOLETTE Ý: N?u b?n nói Ti?ng Vi?t, có các d?ch v? h? tr? ngôn ng? mi?n phí dành cho b?n. G?i s? [phone].        REGISTRATION AUTHORIZATION  Form No. 84027 (Rev. 3/25/2024)   Page 3 of 3

## 2025-03-07 NOTE — LETTER
AUTHORIZATION FOR RELEASE OF   CONFIDENTIAL INFORMATION    Dear [facility] ,        We are seeing Karen Paredes, date of birth 1995, in the clinic at [facility]. Kaleb Mchugh III, MD is the patient's PCP. Karen Paredes has an outstanding lab/procedure at the time we reviewed her chart. In order to help keep her health information updated, she has authorized us to request the following medical record(s):                                                          (X)  PAP SMEAR  w/ HPV                                                                   Please fax records to [facility], Norman V. III, MD,      [facility]                                                             Please Fax to [facility] at [phone]    Thank you for your help, Vanessa Herbert LPN-CCC. If I can be of any assistance you can call at [phone]                    Patient Name: Karen Paredes  : 1995  Patient Phone #: [phone]                                        Karen Paredes  MRN: [id number]  : 1995  Age: 29 y.o.  Sex: female         Patient/Legal Guardian Signature  This signature was collected at 2025           _______________________________   Printed Name/Relationship to Patient      Consent for Examination and Treatment: I hereby authorize the providers and employees of [facility] ([facility]) to provide medical treatment/services which includes, but is not limited to, performing and administering tests and diagnostic procedures that are deemed necessary, including, but not limited to, imaging examinations, blood tests and other laboratory procedures as may be required by the hospital, clinic, or may be ordered by my physician(s) or persons working under the general and/or special instructions of my physician(s).      I understand and agree that this consent covers all authorized persons, including but not limited to  physicians, residents, nurse practitioners, physicians' assistants, specialists, consultants, student nurses, and independently contracted physicians, who are called upon by the physician in charge, to carry out the diagnostic procedures and medical or surgical treatment.     I hereby authorize [facility] to retain or dispose of any specimens or tissue, should there be such remaining from any test or procedure.     I hereby authorize and give consent for [facility] providers and employees to take photographs, images or videotapes of such diagnostic, surgical or treatment procedures of Patient as may be required by [facility] or as may be ordered by a physician. I further acknowledge and agree that [facility] may use cameras or other devices for patient monitoring.     I am aware that the practice of medicine is not an exact science, and I acknowledge that no guarantees have been made to me as to the outcome of any tests, procedures or treatment.     Authorization for Release of Information: I understand that my insurance company and/or their agents may need information necessary to make determinations about payment/reimbursement. I hereby provide authorization to release to all insurance companies, their successors, assignees, other parties with whom they may have contracted, or others acting on their behalf, that are involved with payment for any hospital and/or clinic charges incurred by the patient, any information that they request and deem necessary for payment/reimbursement, and/or quality review.  I further authorize the release of my health information to physicians or other health care practitioners on staff who are involved in my health care now and in the future, and to other health care providers, entities, or institutions for the purpose of my continued care and treatment, including referrals.     REGISTRATION AUTHORIZATION  Form No. 03539 (Rev. 3/25/2024)    Page 1 of 3                       Medicare Patient's  Certification and Authorization to Release Information and Payment Request:  I certify that the information given by me in applying for payment under Title XVIII of the Social Security Act is correct. I authorize any candelaria of medical or other information about me to release to the Social SecurityAdministration, or its intermediaries or carriers, any information needed for this or a related Medicare claim. I request that payment of authorized benefits be made on my behalf.     Assignment of Insurance Benefits:   I hereby authorize any and all insurance companies, health plans, defined   benefit plans, health insurers or any entity that is or may be responsible for payment of my medical expenses to pay all hospital and medical benefits now due, and to become due and payable to me under any hospital benefits, sick benefits, injury benefits or any other benefit for services rendered to me, including Major Medical Benefits, direct to [facility] and all independently contracted physicians. I assign any and all rights that I may have against any and all insurance companies, health plans, defined benefit plans, health insurers or any entity that is or may be responsible for payment of my medical expenses, including, but not limited to any right to appeal a denial of a claim, any right to bring any action, lawsuit, administrative proceeding, or other cause of action on my behalf. I specifically assign my right to pursue litigation against any and all insurance companies, health plans, defined benefit plans, health insurers or any entity that is or may be responsible for payment of my medical expenses based upon a refusal to pay charges.            E. Valuables: It is understood and agreed that [facility] is not liable for the damage to or loss of any money, jewelry,   documents, dentures, eye glasses, hearing aids, prosthetics, or other property of value.     F. Computer Equipment: I understand and agree that should I choose to  use computer equipment owned by [facility] or if I choose to access the Internet via [facility]s network, I do so at my own risk. [facility] is not responsible for any damage to my computer equipment or to any damages of any type that might arise from my loss of equipment or data.     G. Acceptance of Financial Responsibility:  I agree that in consideration of the services and   supplies that have been   or will be furnished to the patient, I am hereby obligated to pay all charges made for or on the account of the patient according to the standard rates (in effect at the time the services and supplies are delivered) established by [facility], including its Patient Financial Assistance Policy to the extent it is applicable. I understand that I am responsible for all charges, or portions thereof, not covered by insurance or other sources. Patient refunds will be distributed only after balances at all [facility] facilities are paid.     H. Communication Authorization:  I hereby authorize [facility] and its representatives, along with any billing service   or  who may work on their behalf, to contact me on   my cell phone and/or home phone using pre- recorded messages, artificial voice messages, automatic telephone dialing devices or other computer assisted technology, or by electronic      mail, text messaging, or by any other form of electronic communication. This includes, but is not limited to, appointment reminders, yearly physical exam reminders, preventive care reminders, patient campaigns, welcome calls, and calls about account balances on my account or any account on which I am listed as a guarantor. I understand I have the right to opt out of these communications at any time.      Relationship  Between  Facility and  Provider:      I understand that some, but not all, providers furnishing services to the patient are not employees or agents of [facility]. The patient is under the care and supervision of his/her  attending physician, and it is the responsibility of the facility and its nursing staff to carry out the instructions of such physicians. It is the responsibility of the patient's physician/designee to obtain the patient's informed consent, when required, for medical or surgical treatment, special diagnostic or therapeutic procedures, or hospital services rendered for the patient under the special instructions of the physician/designee.           REGISTRATION AUTHORIZATION  Form No. 93702 (Rev. 3/25/2024)    Page 2 of 3                       Immunizations: [facility] shares immunization information with state sponsored health departments to help you and your doctor keep track of your immunization records. By signing, you consent to have this information shared with the health department in your state:                                [place] - LINKS ([place] Immunization Network for Kids Statewide)                                [place] - MIIX ([place] Immunization Information eXchange)                                [place] - ImmPRINT (Immunization Patient Registry with Integrated Technology)     TERM: This authorization is valid for this and subsequent care/treatment I receive at [facility] and will remain valid unless/until revoked in writing by me.     [facility]: As used in this document, [facility] means all [facility] owned and managed facilities, including, but not limited to, all health centers, surgery centers, clinics, urgent care centers, and hospitals.         [facility] complies with applicable Federal civil rights laws and does not discriminate on the basis of race, color, national origin, age, disability, or sex.  ATENCIÓN: si habla jailynañol, tiene a bradshaw disposición servicios gratuitos de asistencia lingüística. Wendi demarco [phone].  CHÚ Ý: N?u b?n nói Ti?ng Vi?t, có các d?ch v? h? tr? ngôn ng? mi?n phí dành cho b?n. G?i s? [phone].        REGISTRATION  AUTHORIZATION  Form No. 29219 (Rev. 3/25/2024)   Page 3 of 3

## 2025-03-07 NOTE — PROGRESS NOTES
03/07/2025  VB chart audit performed. Care Everywhere updates requested and reviewed  Overdue HM topic chart audit and/or requested. LINKS triggered and reconciled. Media reviewed  Health Maintenance Topic(s) Outreach Outcomes & Actions Taken:    Eye Exam - Outreach Outcomes & Actions Taken  : External Records Requested & Care Team Updated if Applicable and [facility]     Cervical Cancer Screening - Outreach Outcomes & Actions Taken  : External Records Requested & Care Team Updated if Applicable and [facility]

## 2025-03-13 ENCOUNTER — PATIENT OUTREACH (OUTPATIENT)
Dept: ADMINISTRATIVE | Facility: HOSPITAL | Age: 30
End: 2025-03-13
Payer: COMMERCIAL

## 2025-03-13 NOTE — PROGRESS NOTES
03/13/2025  VB chart audit performed. Care Everywhere updates requested and reviewed  Overdue HM topic chart audit and/or requested. LINKS triggered and reconciled. Media reviewed. Health Maintenance Topic(s) Outreach Outcomes & Actions Taken:    Cervical Cancer Screening - Outreach Outcomes & Actions Taken  : External Records Uploaded & Care Team Updated if Applicable     Additional Notes:  HPV/pap     Care Management, Digital Medicine, and/or Education Referrals  Next Steps - Referral Actions: Digital Medicine Outcomes and Actions Taken: Pt Declined or Not Eligible

## 2025-03-19 ENCOUNTER — OFFICE VISIT (OUTPATIENT)
Dept: INTERNAL MEDICINE | Facility: CLINIC | Age: 30
End: 2025-03-19
Payer: COMMERCIAL

## 2025-03-19 DIAGNOSIS — E11.69 TYPE 2 DIABETES MELLITUS WITH OTHER SPECIFIED COMPLICATION, WITHOUT LONG-TERM CURRENT USE OF INSULIN: Primary | ICD-10-CM

## 2025-03-19 PROCEDURE — 3051F HG A1C>EQUAL 7.0%<8.0%: CPT | Mod: CPTII,95,, | Performed by: INTERNAL MEDICINE

## 2025-03-19 PROCEDURE — 3061F NEG MICROALBUMINURIA REV: CPT | Mod: CPTII,95,, | Performed by: INTERNAL MEDICINE

## 2025-03-19 PROCEDURE — 1159F MED LIST DOCD IN RCRD: CPT | Mod: CPTII,95,, | Performed by: INTERNAL MEDICINE

## 2025-03-19 PROCEDURE — 98006 SYNCH AUDIO-VIDEO EST MOD 30: CPT | Mod: 95,,, | Performed by: INTERNAL MEDICINE

## 2025-03-19 PROCEDURE — 3066F NEPHROPATHY DOC TX: CPT | Mod: CPTII,95,, | Performed by: INTERNAL MEDICINE

## 2025-03-19 PROCEDURE — 1160F RVW MEDS BY RX/DR IN RCRD: CPT | Mod: CPTII,95,, | Performed by: INTERNAL MEDICINE

## 2025-03-19 PROCEDURE — G2211 COMPLEX E/M VISIT ADD ON: HCPCS | Mod: 95,,, | Performed by: INTERNAL MEDICINE

## 2025-03-19 RX ORDER — LANCETS
1 EACH MISCELLANEOUS DAILY PRN
Qty: 100 EACH | Refills: 0 | Status: SHIPPED | OUTPATIENT
Start: 2025-03-19

## 2025-03-19 RX ORDER — INSULIN PUMP SYRINGE, 3 ML
EACH MISCELLANEOUS
Qty: 1 EACH | Refills: 0 | Status: SHIPPED | OUTPATIENT
Start: 2025-03-19

## 2025-03-19 NOTE — PROGRESS NOTES
Assessment:         1. Type 2 diabetes mellitus with other specified complication, without long-term current use of insulin          Plan:           Diagnoses and all orders for this visit:    Type 2 diabetes mellitus with other specified complication, without long-term current use of insulin  -     blood sugar diagnostic Strp; 1 strip by Misc.(Non-Drug; Combo Route) route 3 (three) times daily after meals.  -     lancets Misc; 1 each by Misc.(Non-Drug; Combo Route) route daily as needed.  -     blood-glucose meter kit; Use as instructed        Assessment & Plan    IMPRESSION:  Reviewed recent lab results showing mild microcytic anemia, low vitamin D, and LDL at 76.  Assessed readiness to start Mounjaro for diabetes management.    MEDICATIONS:  - Started Mounjaro for diabetes management, take with food.  - Discussed potential side effects of Mounjaro and explained importance of taking it with food.    ORDERS:  - Ordered glucose monitoring equipment given initiation of Mounjaro.    FOLLOW UP:  - Follow up in about a month to report on progress with Mounjaro via patient message.  - Follow up in June for 2-3 month trial period evaluation on Mounjaro.  - Contact the office if experiencing major side effects or issues with taking the medication.             Subjective:       Patient ID: Karen Paredes is a 29 y.o. female.    Chief Complaint: No chief complaint on file.      Interim Hx  Concerns today  Chronic problems        History of Present Illness    CHIEF COMPLAINT:  Patient presents today for follow up of multiple medical problems.    DIABETES:  She has not initiated prescribed Mounjaro therapy due to concerns about potential side effects during midterm exams.    PREVENTIVE CARE:  She has completed a recent eye exam at [facility] and pap smear.    LABS:  Labs showed mild microcytic anemia, low vitamin D, and LDL of 76 mg/dL.           Review of Systems   Constitutional:  Negative for fatigue.   Cardiovascular:   "Negative for chest pain.   Endocrine: Negative for polydipsia, polyphagia and polyuria.   Integumentary:  Negative for pallor.   Neurological:  Positive for dizziness and headaches. Negative for tremors, seizures, speech difficulty and weakness.   Psychiatric/Behavioral:  Negative for confusion. The patient is not nervous/anxious.            Health Maintenance Due   Topic Date Due    Diabetic Eye Exam  10/07/2022    Influenza Vaccine (1) Never done    COVID-19 Vaccine (3 - 2024-25 season) 09/01/2024         Objective:     There were no vitals taken for this visit.  .Physical Exam                    3/6/2025     8:09 AM 1/29/2025     9:41 AM 12/6/2024     8:39 AM 11/7/2024     8:53 AM 9/16/2024     8:26 PM   Vitals   Height 5' 1" (1.549 m)    5' 1" (1.549 m)   Weight (lbs) 244.71 240.52 238.98 243.61 237   BMI (kg/m2) 46.3    44.8          Physical Exam  Nursing note reviewed.   Constitutional:       General: She is not in acute distress.     Appearance: Normal appearance. She is not ill-appearing, toxic-appearing or diaphoretic.   HENT:      Head: Normocephalic.   Eyes:      Conjunctiva/sclera: Conjunctivae normal.   Pulmonary:      Effort: Pulmonary effort is normal. No respiratory distress.   Neurological:      General: No focal deficit present.      Mental Status: She is alert and oriented to person, place, and time.   Psychiatric:         Mood and Affect: Mood normal.         Behavior: Behavior normal.         Thought Content: Thought content normal.         Judgment: Judgment normal.           No results found for this or any previous visit (from the past 2 weeks).        Future Appointments   Date Time Provider Department Center   5/12/2025  9:20 AM Kaleb Mchugh III, MD [facility]   11/4/2025  8:30 AM Philip Hooper OD [facility] [place]         Medication List with Changes/Refills   New Medications    BLOOD SUGAR DIAGNOSTIC STRP    1 strip by Misc.(Non-Drug; Combo Route) route 3 (three) " "times daily after meals.    BLOOD-GLUCOSE METER KIT    Use as instructed    LANCETS MISC    1 each by Misc.(Non-Drug; Combo Route) route daily as needed.   Current Medications    AMMONIUM LACTATE (LAC-HYDRIN) 12 % LOTION    Apply topically 2 (two) times daily.    BLOOD-GLUCOSE METER MISC        ERGOCALCIFEROL (ERGOCALCIFEROL) 50,000 UNIT CAP    Take 1 capsule (50,000 Units total) by mouth every 7 days. for 12 doses    ETONOGESTREL (NEXPLANON SDRM)    by Subdermal route.    HYDROQUINONE 4 % CREA    Apply topically.    PEN NEEDLE, DIABETIC (BD ULTRA-FINE JEF PEN NEEDLE) 32 GAUGE X 5/32" NDLE    1 each by Misc.(Non-Drug; Combo Route) route once daily.    PREVIDENT 5000 SENSITIVE 1.1-5 % PSTE        TIRZEPATIDE (MOUNJARO) 2.5 MG/0.5 ML PNIJ    Inject 2.5 mg into the skin every 7 days.    TIRZEPATIDE (MOUNJARO) 5 MG/0.5 ML PNIJ    Inject 5 mg into the skin every 7 days.    TIRZEPATIDE 10 MG/0.5 ML PNIJ    Inject 10 mg into the skin every 7 days.    TIRZEPATIDE 7.5 MG/0.5 ML PNIJ    Inject 7.5 mg into the skin every 7 days.    TRETINOIN (RETIN-A) 0.05 % CREAM    Apply topically every evening.    VALACYCLOVIR (VALTREX) 500 MG TABLET    Take 500 mg by mouth 2 (two) times daily.         Disclaimer:  This note has been generated using voice-recognition software. There may be grammatical or spelling errors that have been missed during proof-reading   This note was generated with the assistance of ambient listening technology. Verbal consent was obtained by the patient and accompanying visitor(s) for the recording of patient appointment to facilitate this note. I attest to having reviewed and edited the generated note for accuracy, though some syntax or spelling errors may persist. Please contact the author of this note for any clarification.      The patient location is: [place]   The chief complaint leading to consultation is: dm    Visit type: audiovisual    Face to Face time with patient: 20   minutes of total time spent " on the encounter, which includes face to face time and non-face to face time preparing to see the patient (eg, review of tests), Obtaining and/or reviewing separately obtained history, Documenting clinical information in the electronic or other health record, Independently interpreting results (not separately reported) and communicating results to the patient/family/caregiver, or Care coordination (not separately reported).         Each patient to whom he or she provides medical services by telemedicine is:  (1) informed of the relationship between the physician and patient and the respective role of any other health care provider with respect to management of the patient; and (2) notified that he or she may decline to receive medical services by telemedicine and may withdraw from such care at any time.    Notes:

## 2025-03-31 ENCOUNTER — TELEPHONE (OUTPATIENT)
Dept: BARIATRICS | Facility: CLINIC | Age: 30
End: 2025-03-31
Payer: COMMERCIAL

## 2025-03-31 NOTE — TELEPHONE ENCOUNTER
----- Message from Nadia sent at 3/31/2025  3:11 PM CDT -----  Regarding: Patient concerns  Contact: pt [phone]  Type:  Needs Medical AdviceWho Called: Karen called in regards to getting another appt pt says she cancelled her surgery but now she would like schedule an another appt with the provider to see about her options Would the patient rather a call back or a response via [facility]? Call back Best Call Back Number: pt [phone] Additional Information:

## 2025-03-31 NOTE — TELEPHONE ENCOUNTER
Spoke to patient who says that she has changed her mind and would like to be scheduled for a Sleeve procedure. Advised patient that I will look at her Bariatric Surgery Work-Up to see if she needs to repeat any of her testing.

## 2025-04-01 ENCOUNTER — TELEPHONE (OUTPATIENT)
Dept: BARIATRICS | Facility: CLINIC | Age: 30
End: 2025-04-01
Payer: COMMERCIAL

## 2025-04-01 ENCOUNTER — CLINICAL SUPPORT (OUTPATIENT)
Dept: BARIATRICS | Facility: CLINIC | Age: 30
End: 2025-04-01
Payer: COMMERCIAL

## 2025-04-01 DIAGNOSIS — E66.01 MORBID OBESITY WITH BMI OF 45.0-49.9, ADULT: ICD-10-CM

## 2025-04-01 DIAGNOSIS — E11.69 TYPE 2 DIABETES MELLITUS WITH OTHER SPECIFIED COMPLICATION, WITHOUT LONG-TERM CURRENT USE OF INSULIN: ICD-10-CM

## 2025-04-01 DIAGNOSIS — Z71.3 DIETARY COUNSELING: Primary | ICD-10-CM

## 2025-04-01 PROCEDURE — 97803 MED NUTRITION INDIV SUBSEQ: CPT | Mod: 95,,, | Performed by: DIETITIAN, REGISTERED

## 2025-04-01 NOTE — TELEPHONE ENCOUNTER
Just called patient to remind her of her appointment with Isabell Rangel RD at 11:30. She stated that she is logging on for the virtual now. She got out of a meeting late. Mily Rangel RD

## 2025-04-01 NOTE — PROGRESS NOTES
The patient location is: home ([place])  The chief complaint leading to consultation is: morbid obesity    Visit type: audiovisual    Face to Face time with patient: 30 min  30 minutes of total time spent on the encounter, which includes face to face time and non-face to face time preparing to see the patient (eg, review of tests), Obtaining and/or reviewing separately obtained history, Documenting clinical information in the electronic or other health record, Independently interpreting results (not separately reported) and communicating results to the patient/family/caregiver, or Care coordination (not separately reported).         Each patient to whom he or she provides medical services by telemedicine is:  (1) informed of the relationship between the physician and patient and the respective role of any other health care provider with respect to management of the patient; and (2) notified that he or she may decline to receive medical services by telemedicine and may withdraw from such care at any time.        NUTRITIONAL Note     Referring Physician: Yue Betancourt M.D.   Reason for MNT Referral: Follow up assessment for Gastric Sleeve work-up     29 y.o. female presents for follow up with self reported 5 lbs weight gain over the past 3 months. Admits she got off track for a while after she decided not to have bariatric surgery, she was afraid of doing the gastric bypass. Now she is wanting to do the sleeve surgery and states she has been back on track with dietary and lifestyle changes in preparation for bariatric surgery for the past 2 weeks. Doing meal prep on Sundays which helps stay on track throughout the week.              Past Medical History:   Diagnosis Date    Diabetes mellitus, type 2      Morbid obesity with BMI of 40.0-44.9, adult      Obesity        CLINICAL DATA:  29 y.o.-year-old Black or  female.  Height: 5 ft.  Weight: 238 lbs  IBW: 126 lbs  BMI: 46.4     DAILY NUTRITIONAL NEEDS:  pre-op nutritional bariatric guidelines to promote weight loss  9659-4398 Calories    Grams Protein     NUTRITION & HEALTH HISTORY:  Greatest challenge: dining out frequency, starchy CHO, and irregular meal patterns     Current diet recall:     B: protein shake + coffee OR 2 eggs   L: baked fish and corn/mashed potatoes  D: grilled steak and scoop of rice     Diet Includes:  Meal pattern: 2 meals + 1 protein supplements  Protein supplements: Premier shakes  Snacking: none   Vegetables: Likes a few. Eats rarely.  Fruits: Likes a variety. Eats rarely.  Beverages: water, CL, sweet tea  Dining out: Weekly. Mostly fast food, restaurants, and take-out.   Cooking at home: Daily. Weekly, baked and sauteed, meat and fish, starchy CH).     Exercise:  Back on track the past 2 weeks.  [employer] 2 times/week (treadmill walking 30 min, arm weights, squats, cycling)     Restrictions to exercise: none     Vitamins / Minerals / Herbs:   Mv gummies  Vit D Rx 50,000IU twice weekly - completed     Labs:   Reviewed     Vit D deficiency     Food Allergies:   None known     Social:  Works from home  Lives alone.  Grocery shopping and food prep rarely done.  Patient believes her mom and sister will be supportive after surgery.  Alcohol: Socially.  Smoking: None.     ASSESSMENT:  Patient demonstrated knowledge of healthy eating behaviors and exercise patterns.  Patient demonstrates willingness to change lifestyle and make behavior modifications AEB dietary changes, protein supplements, and exercise.        Barriers to Education: none     Stage of change: determination/action     NUTRITION DIAGNOSIS:    Morbid Obesity related to Excessive carbohydrate intake, Excessive calorie intake, and Physical inactivity as evidence by BMI.     BARIATRIC DIET DISCUSSION/PLAN:  Discussed diet after surgery and related to patient's food record.  Reviewed nutrition guidelines for before and after surgery.  Answered all questions.  Continue to review  Bariatric Nutrition Guidebook at home and call with any questions.  Work on Bariatric Nutrition Checklist.  - 4 protein meals per day  - Work on expanding variety of vegetables.  - Continue cutting back on starchy CHO in the diet.  - tea must have no added sugar     RECOMMENDATIONS:  Pt is a potential candidate for bariatric surgery - Sleeve.  Follow up in 2 weeks.      Patient verbalized understanding.     Communicated nutrition plan with bariatric team.     SESSION TIME:  30 minutes

## 2025-04-03 ENCOUNTER — TELEPHONE (OUTPATIENT)
Dept: BARIATRICS | Facility: CLINIC | Age: 30
End: 2025-04-03
Payer: COMMERCIAL

## 2025-04-03 DIAGNOSIS — E11.69 TYPE 2 DIABETES MELLITUS WITH OTHER SPECIFIED COMPLICATION, WITHOUT LONG-TERM CURRENT USE OF INSULIN: ICD-10-CM

## 2025-04-03 DIAGNOSIS — E66.01 MORBID OBESITY WITH BODY MASS INDEX (BMI) OF 40.0 TO 49.9: Primary | ICD-10-CM

## 2025-04-03 NOTE — LETTER
"  [facility] - Bariatric Surg 2nd Fl  [address]  [facility] [address]  Phone: [phone]  Fax: [phone] April 3, 2025       Attn: Pre-determination Dept.    RE:  Karen Paredes          OC #: [id number]          : 1995    To Whom It May Concern:  I am sending this letter on behalf of Karen Paredes (a 29 y.o. female) for pre-approval for Bariatric Surgery; specifically the Laparoscopic Sleeve Gastrectomy. Karen has a past medical history of Morbid Obesity and Type II Diabetes Mellitus.  She has made numerous attempts at dieting and exercise programs, and has failed to maintain any sustained weight loss.  Weight/Height/BMI  Estimated body mass index is 44kg/m2 as calculated from the following"  Height as of 25: 5'1" (1.549 m).  Weight as of 25: 105.7 kg (233 lb)    Karen Paredes has been evaluated in our bariatric program by myself, the , and the program dietician and is felt to be an excellent candidate for surgery.  In addition, she has undergone a psychological evaluation, from which a letter is enclosed.  Karen Paredes has undergone extensive pre-operative education and understands all the risks, benefits, and possible complications of surgery.  She has also undergone dietary education and thorough nutritional evaluation via a registered dietician.  Our program provides long term nutritional counseling with unlimited consults with the dietician.    Our team is sending this letter to receive pre-approval for the indicated procedure.  Please let us know if you have any questions or require any further information.  Sincerely,         Meenakshi Pickard MD  General, Laparoscopic, and Bariatric Surgery  [facility] - [place], [place]              "

## 2025-04-03 NOTE — TELEPHONE ENCOUNTER
"Spoke with patient and confirmed the surgical procedure of Laparoscopic Sleeve Gastrectomy with Dr Mcconnell on 5/12/25.  Scheduled preop appts/surgery date/2 week and 8 week post op appts. All dates and times agreed upon. Pt aware that if they are required to have PCP clearance, it must be within 6 months of surgery, unless their medical history has changed, it should be dated, signed and in chart for preop appointment. All medications have been reviewed regarding the necessity to be crushed or broken into pieces smaller that the tip of a pencil eraser for 2 weeks following gastric sleeve surgery and 4 weeks following gastric bypass surgery. Pt instructed to stop taking all NSAIDS 1 week before surgery and for life after surgery. Pt aware that protein liquid diet start date is 4/28/25. Patient is doing well with their diet. Patient was instructed about the progression of the diet phases. The patient's current weight is 233, height is 5'1", and BMI is 44. Refer to medical letter of necessity from Surgeon. Discussed the importance of increased physical activity and dieting lifestyle changes to improve weight loss and meet goals. Screened patient for history of UTI per protocol. Discussed with patient to avoid antibiotics and elective procedures involving sedation/anesthesia within 30 days of surgery unless cleared by the bariatric department. Patient instructed to call the bariatric clinic post op for any s/s of UTI. Patient's mailing address confirmed and informed to expect a manilla envelop containing bariatric surgery pre op booklet, appointment reminders, protein and fluid log sheets, and liquid diet and vitamin information sheets. Pt aware that all appts can be seen in [facility] patient portal at this time. Confirmed email address and informed patient that they will be enrolled in the Patient Reported Outcomes program to track their progress and successes. The first email will be sent 2-3 weeks before surgery and " then every year on your surgery anniversary date. Office phone and fax number given to patient for any future questions/concerns. Discussed the pre-surgery complex carbohydrate beverage to purchase in [facility] to drink 30 minutes before the surgery arrival time. Reviewed the policy of scheduling a covid test 72 hours prior to surgery if necessary.

## 2025-04-08 ENCOUNTER — PATIENT MESSAGE (OUTPATIENT)
Dept: BARIATRICS | Facility: CLINIC | Age: 30
End: 2025-04-08
Payer: COMMERCIAL

## 2025-04-14 ENCOUNTER — CLINICAL SUPPORT (OUTPATIENT)
Dept: BARIATRICS | Facility: CLINIC | Age: 30
End: 2025-04-14
Payer: COMMERCIAL

## 2025-04-14 DIAGNOSIS — E66.01 MORBID OBESITY WITH BMI OF 45.0-49.9, ADULT: ICD-10-CM

## 2025-04-14 DIAGNOSIS — E11.69 TYPE 2 DIABETES MELLITUS WITH OTHER SPECIFIED COMPLICATION, WITHOUT LONG-TERM CURRENT USE OF INSULIN: ICD-10-CM

## 2025-04-14 DIAGNOSIS — Z71.3 DIETARY COUNSELING: Primary | ICD-10-CM

## 2025-04-14 PROCEDURE — 97803 MED NUTRITION INDIV SUBSEQ: CPT | Mod: 95,,, | Performed by: DIETITIAN, REGISTERED

## 2025-04-14 NOTE — PROGRESS NOTES
The patient location is: home ([place])  The chief complaint leading to consultation is: morbid obesity    Visit type: audiovisual    Face to Face time with patient: 15 min  15 minutes of total time spent on the encounter, which includes face to face time and non-face to face time preparing to see the patient (eg, review of tests), Obtaining and/or reviewing separately obtained history, Documenting clinical information in the electronic or other health record, Independently interpreting results (not separately reported) and communicating results to the patient/family/caregiver, or Care coordination (not separately reported).         Each patient to whom he or she provides medical services by telemedicine is:  (1) informed of the relationship between the physician and patient and the respective role of any other health care provider with respect to management of the patient; and (2) notified that he or she may decline to receive medical services by telemedicine and may withdraw from such care at any time.      NUTRITIONAL Note     Referring Physician: Yue Betancourt M.D.   Reason for MNT Referral: Follow up assessment for Gastric Sleeve work-up     29 y.o. female presents states she has been consistent with dietary and lifestyle changes in preparation for bariatric surgery for the past 2 weeks. Doing meal prep on Sundays which helps stay on track throughout the week.              Past Medical History:   Diagnosis Date    Diabetes mellitus, type 2      Morbid obesity with BMI of 40.0-44.9, adult      Obesity        CLINICAL DATA:  29 y.o.-year-old Black or  female.  Height: 5 ft.  Weight: 238 lbs  IBW: 126 lbs  BMI: 46.4     DAILY NUTRITIONAL NEEDS: pre-op nutritional bariatric guidelines to promote weight loss  8552-9121 Calories    Grams Protein     NUTRITION & HEALTH HISTORY:  Greatest challenge: dining out frequency, starchy CHO, and irregular meal patterns     Current diet recall:     B: protein  shake + coffee   L: chicken, steak or salmon  - sunflower seeds  D: steak or salmon, sauteed spinach leaves     Diet Includes:  Meal pattern: 2 meals + 1 protein supplements  Protein supplements: Premier shakes  Snacking: sunflower seeds, snowball  Vegetables: Likes a few. Eats daily.  Fruits: Likes a variety. Eats rarely.  Beverages: water, CL  Dining out: Weekly. Mostly fast food, restaurants, and take-out.   Cooking at home: Daily. Weekly, baked and sauteed, meat and fish, occ vegetables (limited starchy CH).     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  Grocery shopping and food prep rarely done.  Patient believes her mom and sister will be supportive after surgery.  Alcohol: Socially.  Smoking: None.     ASSESSMENT:  Patient demonstrated knowledge of healthy eating behaviors and exercise patterns.  Patient demonstrates willingness to change lifestyle and make behavior modifications AEB dietary changes, protein supplements, and exercise.        Barriers to Education: none     Stage of change: determination/action     NUTRITION DIAGNOSIS:    Morbid Obesity related to Excessive carbohydrate intake, Excessive calorie intake, and Physical inactivity as evidence by BMI.     BARIATRIC DIET DISCUSSION/PLAN:  Discussed diet after surgery and related to patient's food record.  Reviewed nutrition guidelines for before and after surgery.  Answered all questions.  Continue to review Bariatric Nutrition Guidebook at home and call with any questions.     RECOMMENDATIONS:  Pt is a good candidate for bariatric surgery - Sleeve.     Patient verbalized understanding.    Will  f/u before/after surgery as needed.     Communicated nutrition plan with bariatric team.     SESSION TIME:  15 minutes

## 2025-04-15 ENCOUNTER — TELEPHONE (OUTPATIENT)
Dept: BARIATRICS | Facility: CLINIC | Age: 30
End: 2025-04-15
Payer: COMMERCIAL

## 2025-04-16 ENCOUNTER — PATIENT OUTREACH (OUTPATIENT)
Dept: ADMINISTRATIVE | Facility: HOSPITAL | Age: 30
End: 2025-04-16
Payer: COMMERCIAL

## 2025-04-16 NOTE — LETTER
AUTHORIZATION FOR RELEASE OF   CONFIDENTIAL INFORMATION    Dear [facility] ,    We are seeing Karen Paredes, date of birth 1995, in the clinic at [facility]. Kaleb Mchugh III, MD is the patient's PCP. Karen Paredes has an outstanding lab/procedure at the time we reviewed her chart. In order to help keep her health information updated, she has authorized us to request the following medical record(s):                                                      (X)  DIABETIC  EYE EXAM                                         Please fax records to [facility], [place], Norman V. III, MD,     [facility]                                                            Please Fax to [facility] at [phone]    Thank you for your help, Vanessa Herbert LPN-CCC. If I can be of any assistance you can call at [phone]                    Patient Name: Karen Paredes  : 1995  Patient Phone #: [phone]              Karen Paredes  MRN: [id number]  : 1995  Age: 29 y.o.  Sex: female         Patient/Legal Guardian Signature  This signature was collected at 2025           _______________________________   Printed Name/Relationship to Patient      Consent for Examination and Treatment: I hereby authorize the providers and employees of [facility] ([facility]) to provide medical treatment/services which includes, but is not limited to, performing and administering tests and diagnostic procedures that are deemed necessary, including, but not limited to, imaging examinations, blood tests and other laboratory procedures as may be required by the hospital, clinic, or may be ordered by my physician(s) or persons working under the general and/or special instructions of my physician(s).      I understand and agree that this consent covers all authorized persons, including but not limited to physicians, residents, nurse practitioners, physicians' assistants, specialists,  consultants, student nurses, and independently contracted physicians, who are called upon by the physician in charge, to carry out the diagnostic procedures and medical or surgical treatment.     I hereby authorize [facility] to retain or dispose of any specimens or tissue, should there be such remaining from any test or procedure.     I hereby authorize and give consent for [facility] providers and employees to take photographs, images or videotapes of such diagnostic, surgical or treatment procedures of Patient as may be required by [facility] or as may be ordered by a physician. I further acknowledge and agree that [facility] may use cameras or other devices for patient monitoring.     I am aware that the practice of medicine is not an exact science, and I acknowledge that no guarantees have been made to me as to the outcome of any tests, procedures or treatment.     Authorization for Release of Information: I understand that my insurance company and/or their agents may need information necessary to make determinations about payment/reimbursement. I hereby provide authorization to release to all insurance companies, their successors, assignees, other parties with whom they may have contracted, or others acting on their behalf, that are involved with payment for any hospital and/or clinic charges incurred by the patient, any information that they request and deem necessary for payment/reimbursement, and/or quality review.  I further authorize the release of my health information to physicians or other health care practitioners on staff who are involved in my health care now and in the future, and to other health care providers, entities, or institutions for the purpose of my continued care and treatment, including referrals.     REGISTRATION AUTHORIZATION  Form No. 84485 (Rev. 3/25/2024)    Page 1 of 3                       Medicare Patient's Certification and Authorization to Release Information and Payment Request:  I  certify that the information given by me in applying for payment under Title XVIII of the Social Security Act is correct. I authorize any candelaria of medical or other information about me to release to the Social SecurityAdministration, or its intermediaries or carriers, any information needed for this or a related Medicare claim. I request that payment of authorized benefits be made on my behalf.     Assignment of Insurance Benefits:   I hereby authorize any and all insurance companies, health plans, defined   benefit plans, health insurers or any entity that is or may be responsible for payment of my medical expenses to pay all hospital and medical benefits now due, and to become due and payable to me under any hospital benefits, sick benefits, injury benefits or any other benefit for services rendered to me, including Major Medical Benefits, direct to [facility] and all independently contracted physicians. I assign any and all rights that I may have against any and all insurance companies, health plans, defined benefit plans, health insurers or any entity that is or may be responsible for payment of my medical expenses, including, but not limited to any right to appeal a denial of a claim, any right to bring any action, lawsuit, administrative proceeding, or other cause of action on my behalf. I specifically assign my right to pursue litigation against any and all insurance companies, health plans, defined benefit plans, health insurers or any entity that is or may be responsible for payment of my medical expenses based upon a refusal to pay charges.            E. Valuables: It is understood and agreed that [facility] is not liable for the damage to or loss of any money, jewelry,   documents, dentures, eye glasses, hearing aids, prosthetics, or other property of value.     F. Computer Equipment: I understand and agree that should I choose to use computer equipment owned by [facility] or if I choose to access the Internet  via [facility]s network, I do so at my own risk. [facility] is not responsible for any damage to my computer equipment or to any damages of any type that might arise from my loss of equipment or data.     G. Acceptance of Financial Responsibility:  I agree that in consideration of the services and   supplies that have been   or will be furnished to the patient, I am hereby obligated to pay all charges made for or on the account of the patient according to the standard rates (in effect at the time the services and supplies are delivered) established by [facility], including its Patient Financial Assistance Policy to the extent it is applicable. I understand that I am responsible for all charges, or portions thereof, not covered by insurance or other sources. Patient refunds will be distributed only after balances at all [facility] facilities are paid.     H. Communication Authorization:  I hereby authorize [facility] and its representatives, along with any billing service   or  who may work on their behalf, to contact me on   my cell phone and/or home phone using pre- recorded messages, artificial voice messages, automatic telephone dialing devices or other computer assisted technology, or by electronic      mail, text messaging, or by any other form of electronic communication. This includes, but is not limited to, appointment reminders, yearly physical exam reminders, preventive care reminders, patient campaigns, welcome calls, and calls about account balances on my account or any account on which I am listed as a guarantor. I understand I have the right to opt out of these communications at any time.      Relationship  Between  Facility and  Provider:      I understand that some, but not all, providers furnishing services to the patient are not employees or agents of [facility]. The patient is under the care and supervision of his/her attending physician, and it is the responsibility of the facility and its nursing  staff to carry out the instructions of such physicians. It is the responsibility of the patient's physician/designee to obtain the patient's informed consent, when required, for medical or surgical treatment, special diagnostic or therapeutic procedures, or hospital services rendered for the patient under the special instructions of the physician/designee.           REGISTRATION AUTHORIZATION  Form No. 05459 (Rev. 3/25/2024)    Page 2 of 3                       Immunizations: [facility] shares immunization information with state sponsored health departments to help you and your doctor keep track of your immunization records. By signing, you consent to have this information shared with the health department in your state:                                [place] - LINKS ([place] Immunization Network for Kids Statewide)                                [place] - MIIX ([place] Immunization Information eXchange)                                [place] - ImmPRINT (Immunization Patient Registry with Integrated Technology)     TERM: This authorization is valid for this and subsequent care/treatment I receive at [facility] and will remain valid unless/until revoked in writing by me.     [facility]: As used in this document, [facility] means all [facility] owned and managed facilities, including, but not limited to, all health centers, surgery centers, clinics, urgent care centers, and hospitals.         [facility] complies with applicable Federal civil rights laws and does not discriminate on the basis of race, color, national origin, age, disability, or sex.  ATENCIÓN: si habla emanuel, tiene a bradshaw disposición servicios gratuitos de asistencia lingüística. Wendi demarco [phone].  [facility] Ý: N?u b?n nói Ti?ng Vi?t, có các d?ch v? h? tr? ngôn ng? mi?n phí dành cho b?n. G?i s? [phone].        REGISTRATION AUTHORIZATION  Form No. 74674 (Rev. 3/25/2024)   Page 3 of 3

## 2025-04-16 NOTE — PROGRESS NOTES
04/16/2025  VB chart audit performed. Care Everywhere updates requested and reviewed  Overdue HM topic chart audit and/or requested. LINKS triggered and reconciled. Media reviewed.  Health Maintenance Topic(s) Outreach Outcomes & Actions Taken:    Eye Exam - Outreach Outcomes & Actions Taken  : External Records Requested & Care Team Updated if Applicable     Additional Notes:  Lorraine cambpell/ Sherlyn regarding [facility] - DM Eye Exam     Care Management, Digital Medicine, and/or Education Referrals  Next Steps - Referral Actions: Digital Medicine Outcomes and Actions Taken: Pt Declined or Not Eligible

## 2025-04-17 ENCOUNTER — TELEPHONE (OUTPATIENT)
Dept: BARIATRICS | Facility: CLINIC | Age: 30
End: 2025-04-17
Payer: COMMERCIAL

## 2025-04-17 NOTE — TELEPHONE ENCOUNTER
Returned patient's call. She stated that she needs to push her procedure back due finances. She says that she will be able to afford her procedure by the end of May. Advised that 6/16 was the first availability which the patient agreed to. Will send email, reschedule appts, do Remind Me, and update the Bariatric Dashboard.

## 2025-05-13 ENCOUNTER — PATIENT MESSAGE (OUTPATIENT)
Dept: BARIATRICS | Facility: CLINIC | Age: 30
End: 2025-05-13
Payer: COMMERCIAL

## 2025-05-28 ENCOUNTER — OFFICE VISIT (OUTPATIENT)
Dept: BARIATRICS | Facility: CLINIC | Age: 30
End: 2025-05-28
Payer: COMMERCIAL

## 2025-05-28 VITALS
BODY MASS INDEX: 46.24 KG/M2 | DIASTOLIC BLOOD PRESSURE: 74 MMHG | HEIGHT: 61 IN | OXYGEN SATURATION: 100 % | HEART RATE: 93 BPM | RESPIRATION RATE: 16 BRPM | SYSTOLIC BLOOD PRESSURE: 143 MMHG | WEIGHT: 244.94 LBS

## 2025-05-28 DIAGNOSIS — Z98.84 S/P BARIATRIC SURGERY: ICD-10-CM

## 2025-05-28 DIAGNOSIS — E11.69 TYPE 2 DIABETES MELLITUS WITH OTHER SPECIFIED COMPLICATION, WITHOUT LONG-TERM CURRENT USE OF INSULIN: ICD-10-CM

## 2025-05-28 DIAGNOSIS — E66.01 CLASS 3 SEVERE OBESITY DUE TO EXCESS CALORIES WITH SERIOUS COMORBIDITY AND BODY MASS INDEX (BMI) OF 45.0 TO 49.9 IN ADULT: Primary | ICD-10-CM

## 2025-05-28 DIAGNOSIS — E66.813 CLASS 3 SEVERE OBESITY DUE TO EXCESS CALORIES WITH SERIOUS COMORBIDITY AND BODY MASS INDEX (BMI) OF 45.0 TO 49.9 IN ADULT: Primary | ICD-10-CM

## 2025-05-28 PROCEDURE — 3051F HG A1C>EQUAL 7.0%<8.0%: CPT | Mod: CPTII,S$GLB,, | Performed by: SURGERY

## 2025-05-28 PROCEDURE — 3077F SYST BP >= 140 MM HG: CPT | Mod: CPTII,S$GLB,, | Performed by: SURGERY

## 2025-05-28 PROCEDURE — 99999 PR PBB SHADOW E&M-EST. PATIENT-LVL IV: CPT | Mod: PBBFAC,,, | Performed by: SURGERY

## 2025-05-28 PROCEDURE — 1159F MED LIST DOCD IN RCRD: CPT | Mod: CPTII,S$GLB,, | Performed by: SURGERY

## 2025-05-28 PROCEDURE — 3008F BODY MASS INDEX DOCD: CPT | Mod: CPTII,S$GLB,, | Performed by: SURGERY

## 2025-05-28 PROCEDURE — 3078F DIAST BP <80 MM HG: CPT | Mod: CPTII,S$GLB,, | Performed by: SURGERY

## 2025-05-28 PROCEDURE — 99214 OFFICE O/P EST MOD 30 MIN: CPT | Mod: S$GLB,,, | Performed by: SURGERY

## 2025-05-28 PROCEDURE — 3061F NEG MICROALBUMINURIA REV: CPT | Mod: CPTII,S$GLB,, | Performed by: SURGERY

## 2025-05-28 PROCEDURE — 1160F RVW MEDS BY RX/DR IN RCRD: CPT | Mod: CPTII,S$GLB,, | Performed by: SURGERY

## 2025-05-28 PROCEDURE — 3066F NEPHROPATHY DOC TX: CPT | Mod: CPTII,S$GLB,, | Performed by: SURGERY

## 2025-05-28 RX ORDER — GABAPENTIN 300 MG/1
300 CAPSULE ORAL 2 TIMES DAILY
Qty: 10 CAPSULE | Refills: 0 | Status: SHIPPED | OUTPATIENT
Start: 2025-05-28

## 2025-05-28 NOTE — PROGRESS NOTES
"History & Physical    SUBJECTIVE:     History of Present Illness 1/29/25:  Karen Paredes is a 29 year-old morbidly obese female with BMI 45.45 kg/m² and multiple associated comorbidities including NIDDM2, who presents for pre-operative exam for weight loss surgery. She has failed multiple attempts at non-surgical methods of weight loss and has completed the [facility] program which she started on 6/25/24 at which time her BMI was 45.90 kg/m². She meets NIH consensus criteria for bariatric surgery and is interested in undergoing laparoscopic savanna-en-y gastric bypass.      Labs 8/2/24: Hgb 11.7, HbA1c 6.2%, Vit D 13     Clearances:  Psych: [place] 7/26/24    Interval History 5/28/25:  Patient presents for pre-operative discussion. She rescheduled her previously scheduled operation due to her injection schedule. She has also since decided to pursue sleeve gastrectomy. She denies any changes to her health or medication.     Chief Complaint   Patient presents with    Pre-op Exam     LSG 6/16/25     Review of patient's allergies indicates:  No Known Allergies    Current Outpatient Medications   Medication Sig    ammonium lactate (LAC-HYDRIN) 12 % lotion Apply topically 2 (two) times daily.    blood sugar diagnostic Strp 1 strip by Misc.(Non-Drug; Combo Route) route 3 (three) times daily after meals.    blood-glucose meter kit Use as instructed    blood-glucose meter Misc     etonogestrel (NEXPLANON SDRM) by Subdermal route.    hydroquinone 4 % Crea Apply topically.    lancets Misc 1 each by Misc.(Non-Drug; Combo Route) route daily as needed.    pen needle, diabetic (BD ULTRA-FINE JEF PEN NEEDLE) 32 gauge x 5/32" Ndle 1 each by Misc.(Non-Drug; Combo Route) route once daily.    PREVIDENT 5000 SENSITIVE 1.1-5 % Pste     tretinoin (RETIN-A) 0.05 % cream Apply topically every evening.    valACYclovir (VALTREX) 500 MG tablet Take 500 mg by mouth 2 (two) times daily.    tirzepatide 10 mg/0.5 mL PnIj Inject 10 mg " "into the skin every 7 days. (Patient not taking: Reported on 5/28/2025)     No current facility-administered medications for this visit.     Past Medical History:   Diagnosis Date    Diabetes mellitus, type 2     Hemorrhoids     Morbid obesity with BMI of 40.0-44.9, adult     Tonsillitis 10/01/2020     Past Surgical History:   Procedure Laterality Date    TONSILLECTOMY Bilateral 10/01/2020    Procedure: TONSILLECTOMY;  Surgeon: Meenakshi Cannon MD;  Location: [facility];  Service: ENT;  Laterality: Bilateral;  RN PREOP 9/25/2020   T/S---COVID -NEGATIVE ON 9/30  UPT     Review of Systems   Constitutional:  Negative for chills, diaphoresis, fever and malaise/fatigue.   HENT:  Negative for congestion, hearing loss and sore throat.    Eyes:  Negative for blurred vision and double vision.   Respiratory:  Negative for cough and shortness of breath.    Cardiovascular:  Negative for chest pain, palpitations, orthopnea, claudication, leg swelling and PND.   Gastrointestinal:  Negative for abdominal pain, blood in stool, constipation, diarrhea, heartburn, nausea and vomiting.   Genitourinary:  Negative for dysuria and urgency.   Musculoskeletal:  Negative for back pain, falls, joint pain, myalgias and neck pain.   Skin:  Negative for itching and rash.   Neurological:  Negative for weakness and headaches.   Endo/Heme/Allergies:  Does not bruise/bleed easily.   Psychiatric/Behavioral:  Negative for depression and substance abuse.      OBJECTIVE:     Vitals:    05/28/25 0919   BP: (!) 143/74   Pulse: 93   Resp: 16   SpO2: 100%   Weight: 111.1 kg (244 lb 14.9 oz)   Height: 5' 1" (1.549 m)     Physical Exam  Vitals reviewed.   Constitutional:       General: She is not in acute distress.     Appearance: Normal appearance. She is obese.   HENT:      Head: Normocephalic and atraumatic.   Eyes:      Conjunctiva/sclera: Conjunctivae normal.   Neck:      Thyroid: No thyromegaly.   Cardiovascular:      Rate and Rhythm: Normal rate and " regular rhythm.      Heart sounds: Normal heart sounds.   Pulmonary:      Effort: Pulmonary effort is normal. No respiratory distress.      Breath sounds: Normal breath sounds.   Abdominal:      General: There is no distension.      Palpations: Abdomen is soft.      Tenderness: There is no abdominal tenderness. There is no guarding or rebound.   Musculoskeletal:         General: Normal range of motion.      Cervical back: Normal range of motion and neck supple.      Right lower leg: No edema.      Left lower leg: No edema.   Skin:     General: Skin is warm and dry.      Findings: No rash.   Neurological:      General: No focal deficit present.      Mental Status: She is alert and oriented to person, place, and time.      Gait: Gait is intact.   Psychiatric:         Mood and Affect: Mood and affect normal.         Behavior: Behavior normal.         Cognition and Memory: Memory normal.        Laboratory  CBC: Reviewed  BMP: Reviewed  LFTs: Reviewed  Bariatric Labs: Reviewed    Diagnostic Results:  Labs: Reviewed  ECG: Reviewed  X-Ray: Reviewed  Upper GI: Reviewed     Dietitian: Patient has participated in pre-operative nutritional program with understanding of necessary lifelong dietary changes required with surgery.    Psych: No overt contraindications to bariatric surgery, patient has completed psychological evaluation and is able to give informed consent.    PCP: Medically cleared for surgery.     ASSESSMENT/PLAN:     Morbid obesity with failure of medical conservative therapy.    Co Morbid Conditions:   Problem List[1]    Patient wishes to undergo laparoscopic sleeve gastrectomy. She is scheduled for 6/16/25. She will start the pre-op liquid diet on 6/2/25.     The patient was informed that risks of LSG include, but are not limited to: bleeding, infection, injury to intraabdominal structures such as bowel, stomach, esophagus, liver, and spleen; DVT/PE, cardiopulmonary complications such as MI, stroke or PNA;  stricture requiring dilations, incisional hernia, gallstones, exacerbation of mood disorders, vitamin/mineral deficiencies, worsening GERD, failure of weight loss or weight regain, possible conversion to an open operation, and staple-line leak which may require surgical or endoscopic interventions, drains, antibiotics and NPO status with IV nutrition.     We discussed that our goal is to ameliorate her medical problems and not to obtain a specific body mass index. All questions were answered to her satisfaction and she has elected to proceed with surgery. Consent for surgery and blood transfusion were signed. Prescriptions for ondansetron, omeprazole, ursodiol and acetaminophen were sent to the pharmacy for bedside delivery. Patient discussed perioperative instructions with the Bariatric RN.     Meenakshi Mcconnell  6/13/2025           [1]   Patient Active Problem List  Diagnosis    Class 3 severe obesity due to excess calories with serious comorbidity and body mass index (BMI) of 45.0 to 49.9 in adult    Type 2 diabetes mellitus with other specified complication, without long-term current use of insulin

## 2025-05-28 NOTE — H&P (VIEW-ONLY)
"History & Physical    SUBJECTIVE:     History of Present Illness 1/29/25:  Karen Paredes is a 29 year-old morbidly obese female with BMI 45.45 kg/m² and multiple associated comorbidities including NIDDM2, who presents for pre-operative exam for weight loss surgery. She has failed multiple attempts at non-surgical methods of weight loss and has completed the [facility]ariat[facility] program which she started on 6/25/24 at which time her BMI was 45.90 kg/m². She meets NIH consensus criteria for bariatric surgery and is interested in undergoing laparoscopic savanna-en-y gastric bypass.      Labs 8/2/24: Hgb 11.7, HbA1c 6.2%, Vit D 13     Clearances:  Psych: [place] 7/26/24    Interval History 5/28/25:  Patient presents for pre-operative discussion. She rescheduled her previously scheduled operation due to her injection schedule. She has also since decided to pursue sleeve gastrectomy. She denies any changes to her health or medication.     Chief Complaint   Patient presents with    Pre-op Exam     LSG 6/16/25     Review of patient's allergies indicates:  No Known Allergies    Current Outpatient Medications   Medication Sig    ammonium lactate (LAC-HYDRIN) 12 % lotion Apply topically 2 (two) times daily.    blood sugar diagnostic Strp 1 strip by Misc.(Non-Drug; Combo Route) route 3 (three) times daily after meals.    blood-glucose meter kit Use as instructed    blood-glucose meter Misc     etonogestrel (NEXPLANON SDRM) by Subdermal route.    hydroquinone 4 % Crea Apply topically.    lancets Misc 1 each by Misc.(Non-Drug; Combo Route) route daily as needed.    pen needle, diabetic (BD ULTRA-FINE JEF PEN NEEDLE) 32 gauge x 5/32" Ndle 1 each by Misc.(Non-Drug; Combo Route) route once daily.    PREVIDENT 5000 SENSITIVE 1.1-5 % Pste     tretinoin (RETIN-A) 0.05 % cream Apply topically every evening.    valACYclovir (VALTREX) 500 MG tablet Take 500 mg by mouth 2 (two) times daily.    tirzepatide 10 mg/0.5 mL PnIj Inject 10 mg " "into the skin every 7 days. (Patient not taking: Reported on 5/28/2025)     No current facility-administered medications for this visit.     Past Medical History:   Diagnosis Date    Diabetes mellitus, type 2     Hemorrhoids     Morbid obesity with BMI of 40.0-44.9, adult     Tonsillitis 10/01/2020     Past Surgical History:   Procedure Laterality Date    TONSILLECTOMY Bilateral 10/01/2020    Procedure: TONSILLECTOMY;  Surgeon: Meenakshi Cannon MD;  Location: [facility];  Service: ENT;  Laterality: Bilateral;  RN PREOP 9/25/2020   T/S---COVID -NEGATIVE ON 9/30  UPT     Review of Systems   Constitutional:  Negative for chills, diaphoresis, fever and malaise/fatigue.   HENT:  Negative for congestion, hearing loss and sore throat.    Eyes:  Negative for blurred vision and double vision.   Respiratory:  Negative for cough and shortness of breath.    Cardiovascular:  Negative for chest pain, palpitations, orthopnea, claudication, leg swelling and PND.   Gastrointestinal:  Negative for abdominal pain, blood in stool, constipation, diarrhea, heartburn, nausea and vomiting.   Genitourinary:  Negative for dysuria and urgency.   Musculoskeletal:  Negative for back pain, falls, joint pain, myalgias and neck pain.   Skin:  Negative for itching and rash.   Neurological:  Negative for weakness and headaches.   Endo/Heme/Allergies:  Does not bruise/bleed easily.   Psychiatric/Behavioral:  Negative for depression and substance abuse.      OBJECTIVE:     Vitals:    05/28/25 0919   BP: (!) 143/74   Pulse: 93   Resp: 16   SpO2: 100%   Weight: 111.1 kg (244 lb 14.9 oz)   Height: 5' 1" (1.549 m)     Physical Exam  Vitals reviewed.   Constitutional:       General: She is not in acute distress.     Appearance: Normal appearance. She is obese.   HENT:      Head: Normocephalic and atraumatic.   Eyes:      Conjunctiva/sclera: Conjunctivae normal.   Neck:      Thyroid: No thyromegaly.   Cardiovascular:      Rate and Rhythm: Normal rate and " regular rhythm.      Heart sounds: Normal heart sounds.   Pulmonary:      Effort: Pulmonary effort is normal. No respiratory distress.      Breath sounds: Normal breath sounds.   Abdominal:      General: There is no distension.      Palpations: Abdomen is soft.      Tenderness: There is no abdominal tenderness. There is no guarding or rebound.   Musculoskeletal:         General: Normal range of motion.      Cervical back: Normal range of motion and neck supple.      Right lower leg: No edema.      Left lower leg: No edema.   Skin:     General: Skin is warm and dry.      Findings: No rash.   Neurological:      General: No focal deficit present.      Mental Status: She is alert and oriented to person, place, and time.      Gait: Gait is intact.   Psychiatric:         Mood and Affect: Mood and affect normal.         Behavior: Behavior normal.         Cognition and Memory: Memory normal.        Laboratory  CBC: Reviewed  BMP: Reviewed  LFTs: Reviewed  Bariatric Labs: Reviewed    Diagnostic Results:  Labs: Reviewed  ECG: Reviewed  X-Ray: Reviewed  Upper GI: Reviewed     Dietitian: Patient has participated in pre-operative nutritional program with understanding of necessary lifelong dietary changes required with surgery.    Psych: No overt contraindications to bariatric surgery, patient has completed psychological evaluation and is able to give informed consent.    PCP: Medically cleared for surgery.     ASSESSMENT/PLAN:     Morbid obesity with failure of medical conservative therapy.    Co Morbid Conditions:   Problem List[1]    Patient wishes to undergo laparoscopic sleeve gastrectomy. She is scheduled for 6/16/25. She will start the pre-op liquid diet on 6/2/25.     The patient was informed that risks of LSG include, but are not limited to: bleeding, infection, injury to intraabdominal structures such as bowel, stomach, esophagus, liver, and spleen; DVT/PE, cardiopulmonary complications such as MI, stroke or PNA;  stricture requiring dilations, incisional hernia, gallstones, exacerbation of mood disorders, vitamin/mineral deficiencies, worsening GERD, failure of weight loss or weight regain, possible conversion to an open operation, and staple-line leak which may require surgical or endoscopic interventions, drains, antibiotics and NPO status with IV nutrition.     We discussed that our goal is to ameliorate her medical problems and not to obtain a specific body mass index. All questions were answered to her satisfaction and she has elected to proceed with surgery. Consent for surgery and blood transfusion were signed. Prescriptions for ondansetron, omeprazole, ursodiol and acetaminophen were sent to the pharmacy for bedside delivery. Patient discussed perioperative instructions with the Bariatric RN.     Meenakshi Mcconnell  6/13/2025           [1]   Patient Active Problem List  Diagnosis    Class 3 severe obesity due to excess calories with serious comorbidity and body mass index (BMI) of 45.0 to 49.9 in adult    Type 2 diabetes mellitus with other specified complication, without long-term current use of insulin

## 2025-06-02 ENCOUNTER — CLINICAL SUPPORT (OUTPATIENT)
Dept: BARIATRICS | Facility: CLINIC | Age: 30
End: 2025-06-02
Payer: COMMERCIAL

## 2025-06-02 DIAGNOSIS — Z71.3 DIETARY COUNSELING AND SURVEILLANCE: Primary | ICD-10-CM

## 2025-06-03 ENCOUNTER — PATIENT MESSAGE (OUTPATIENT)
Dept: BARIATRICS | Facility: CLINIC | Age: 30
End: 2025-06-03
Payer: COMMERCIAL

## 2025-06-09 ENCOUNTER — PATIENT MESSAGE (OUTPATIENT)
Dept: ADMINISTRATIVE | Facility: HOSPITAL | Age: 30
End: 2025-06-09
Payer: COMMERCIAL

## 2025-06-13 ENCOUNTER — TELEPHONE (OUTPATIENT)
Dept: BARIATRICS | Facility: CLINIC | Age: 30
End: 2025-06-13
Payer: COMMERCIAL

## 2025-06-13 ENCOUNTER — ANESTHESIA EVENT (OUTPATIENT)
Dept: SURGERY | Facility: HOSPITAL | Age: 30
DRG: 621 | End: 2025-06-13
Payer: COMMERCIAL

## 2025-06-13 RX ORDER — KETOROLAC TROMETHAMINE 15 MG/ML
15 INJECTION, SOLUTION INTRAMUSCULAR; INTRAVENOUS ONCE
OUTPATIENT
Start: 2025-06-13 | End: 2025-06-13

## 2025-06-13 RX ORDER — ACETAMINOPHEN 650 MG/20.3ML
1000 LIQUID ORAL EVERY 8 HOURS
OUTPATIENT
Start: 2025-06-13

## 2025-06-13 RX ORDER — SODIUM CHLORIDE, SODIUM LACTATE, POTASSIUM CHLORIDE, CALCIUM CHLORIDE 600; 310; 30; 20 MG/100ML; MG/100ML; MG/100ML; MG/100ML
INJECTION, SOLUTION INTRAVENOUS CONTINUOUS
OUTPATIENT
Start: 2025-06-13

## 2025-06-13 RX ORDER — PROCHLORPERAZINE EDISYLATE 5 MG/ML
5 INJECTION INTRAMUSCULAR; INTRAVENOUS EVERY 6 HOURS PRN
OUTPATIENT
Start: 2025-06-13

## 2025-06-13 RX ORDER — PANTOPRAZOLE SODIUM 40 MG/10ML
40 INJECTION, POWDER, LYOPHILIZED, FOR SOLUTION INTRAVENOUS 2 TIMES DAILY
OUTPATIENT
Start: 2025-06-13

## 2025-06-13 RX ORDER — URSODIOL 500 MG/1
500 TABLET, FILM COATED ORAL DAILY
Qty: 30 TABLET | Refills: 5 | Status: SHIPPED | OUTPATIENT
Start: 2025-06-13 | End: 2025-12-10

## 2025-06-13 RX ORDER — GABAPENTIN 250 MG/5ML
300 SOLUTION ORAL 2 TIMES DAILY
OUTPATIENT
Start: 2025-06-13

## 2025-06-13 RX ORDER — ACETAMINOPHEN 650 MG/20.3ML
500 LIQUID ORAL
OUTPATIENT
Start: 2025-06-13

## 2025-06-13 RX ORDER — SODIUM CHLORIDE 9 MG/ML
INJECTION, SOLUTION INTRAVENOUS CONTINUOUS
OUTPATIENT
Start: 2025-06-13

## 2025-06-13 RX ORDER — ENOXAPARIN SODIUM 100 MG/ML
40 INJECTION SUBCUTANEOUS EVERY 12 HOURS
OUTPATIENT
Start: 2025-06-13

## 2025-06-13 RX ORDER — OXYCODONE HCL 5 MG/5 ML
5 SOLUTION, ORAL ORAL EVERY 6 HOURS PRN
Refills: 0 | OUTPATIENT
Start: 2025-06-13

## 2025-06-13 RX ORDER — MUPIROCIN 20 MG/G
OINTMENT TOPICAL
OUTPATIENT
Start: 2025-06-13

## 2025-06-13 RX ORDER — HEPARIN SODIUM 5000 [USP'U]/ML
5000 INJECTION, SOLUTION INTRAVENOUS; SUBCUTANEOUS ONCE
OUTPATIENT
Start: 2025-06-13 | End: 2025-06-13

## 2025-06-13 RX ORDER — ONDANSETRON HYDROCHLORIDE 2 MG/ML
8 INJECTION, SOLUTION INTRAVENOUS EVERY 6 HOURS
OUTPATIENT
Start: 2025-06-13

## 2025-06-13 RX ORDER — DEXTROMETHORPHAN/PSEUDOEPHED 2.5-7.5/.8
40 DROPS ORAL 4 TIMES DAILY PRN
OUTPATIENT
Start: 2025-06-13

## 2025-06-13 RX ORDER — SCOPOLAMINE 1 MG/3D
1 PATCH, EXTENDED RELEASE TRANSDERMAL ONCE
OUTPATIENT
Start: 2025-06-13 | End: 2025-06-13

## 2025-06-13 RX ORDER — ONDANSETRON 8 MG/1
8 TABLET, ORALLY DISINTEGRATING ORAL EVERY 6 HOURS PRN
Qty: 30 TABLET | Refills: 0 | Status: SHIPPED | OUTPATIENT
Start: 2025-06-13

## 2025-06-13 RX ORDER — FAMOTIDINE 10 MG/ML
20 INJECTION, SOLUTION INTRAVENOUS ONCE
OUTPATIENT
Start: 2025-06-13 | End: 2025-06-13

## 2025-06-13 RX ORDER — OMEPRAZOLE 40 MG/1
40 CAPSULE, DELAYED RELEASE ORAL EVERY MORNING
Qty: 30 CAPSULE | Refills: 2 | Status: SHIPPED | OUTPATIENT
Start: 2025-06-13

## 2025-06-13 RX ORDER — ACETAMINOPHEN 10 MG/ML
1000 INJECTION, SOLUTION INTRAVENOUS ONCE
OUTPATIENT
Start: 2025-06-13 | End: 2025-06-13

## 2025-06-13 RX ORDER — LIDOCAINE HYDROCHLORIDE 10 MG/ML
1 INJECTION, SOLUTION EPIDURAL; INFILTRATION; INTRACAUDAL; PERINEURAL ONCE
OUTPATIENT
Start: 2025-06-13 | End: 2025-06-13

## 2025-06-13 NOTE — TELEPHONE ENCOUNTER
Notified patient of arrival time to the [facility] 2nd floor [facility] at 0500 with expected surgery start time 0700 on 6/16/25. Instructed patient regarding pre-op instructions including no protein shakes or sugar free clear liquids after midnight but can have a rare sip of water for comfort, showering and preop medications to hold/take per anesthesia/preop. Reminded pt to drink pre-surgery beverage or 8 oz water and take one dose of Gabapentin at 0430. Instructed patient on the s/s of dehydration and for patient to call at the first sign of dehydration. Informed patient that someone from bariatrics will call them 1 week post op to review diet/fluid intake and to ensure adequate hydration. Reminded patient not to take antibiotics for 30 days following surgery or schedule elective procedures involving anesthesia/sedation for 30 days following surgery unless checking with the bariatric clinic first. Pt verbalized understanding. Pt given office phone number for any additional questions/concerns.

## 2025-06-13 NOTE — ANESTHESIA PREPROCEDURE EVALUATION
[facility]  Anesthesia Pre-Operative Evaluation         Patient Name: Karen Paredes  YOB: 1995  MRN: [id number]    SUBJECTIVE:     Pre-operative evaluation for Procedure(s) (LRB):  GASTRECTOMY, SLEEVE, LAPAROSCOPIC with intraop EGD (N/A)     06/13/2025    Karen Paredes is a 29 y.o. female w/ a significant PMHx of T2DM, morbid obesity (BMI 46), possible FEI.    Patient now presents for the above procedure(s).    Prev airway: 10/01/20  Glidescope  Mask Ventilation Easy - oral  Blade Hood #2  Grade Grade I  Complicating Factors Small mouth, Oropharyngeal edema or fat, Obesity, Short neck, Large Tongue  Complications None  Insertion Attempts 1       Problem List[1]    Review of patient's allergies indicates:  No Known Allergies    Current Inpatient Medications:      Medications Ordered Prior to Encounter[2]    Past Surgical History:   Procedure Laterality Date    TONSILLECTOMY Bilateral 10/01/2020    Procedure: TONSILLECTOMY;  Surgeon: Meenakshi Cannon MD;  Location: [facility];  Service: ENT;  Laterality: Bilateral;  RN PREOP 9/25/2020   T/S---COVID -NEGATIVE ON 9/30  UPT       Social History[3]    OBJECTIVE:     Vital Signs Range (Last 24H):         Significant Labs:  Lab Results   Component Value Date    WBC 7.60 01/29/2025    HGB 12.0 01/29/2025    HCT 38.2 01/29/2025     01/29/2025    CHOL 127 03/06/2025    TRIG 47 03/06/2025    HDL 41 03/06/2025    ALT 13 03/06/2025    AST 23 03/06/2025     03/06/2025    K 4.3 03/06/2025     03/06/2025    CREATININE 0.7 03/06/2025    BUN 10 03/06/2025    CO2 25 03/06/2025    TSH 1.308 08/02/2024    HGBA1C 7.1 (H) 03/06/2025       Diagnostic Studies: No relevant studies.    EKG:   Results for orders placed or performed during the hospital encounter of 09/16/24   EKG 12-lead    Collection Time: 09/16/24 11:26 PM   Result  Value Ref Range    QRS Duration 72 ms    [facility] QTC Calculation 408 ms    Narrative    Test Reason : R07.9,    Vent. Rate : 082 BPM     Atrial Rate : 082 BPM     P-R Int : 154 ms          QRS Dur : 072 ms      QT Int : 350 ms       P-R-T Axes : 053 071 048 degrees     QTc Int : 408 ms    Normal sinus rhythm  Nonspecific T wave abnormality  Abnormal ECG  When compared with ECG of 02-AUG-2024 13:16,  No significant change was found  Confirmed by Dio Gomez MD ([id number]) on 9/17/2024 8:51:21 PM    Referred By: ORI   SELF           Confirmed By:Dio Gomez MD       2D ECHO:  TTE:  No results found for this or any previous visit.    PIYUSH:  No results found for this or any previous visit.    ASSESSMENT/PLAN:           Pre-op Assessment    I have reviewed the Patient Summary Reports.     I have reviewed the Nursing Notes. I have reviewed the NPO Status.   I have reviewed the Medications.     Review of Systems  Anesthesia Hx:  No problems with previous Anesthesia   History of prior surgery of interest to airway management or planning:          Denies Family Hx of Anesthesia complications.    Denies Personal Hx of Anesthesia complications.                    Social:  Non-Smoker, No Alcohol Use       Hematology/Oncology:       -- Denies Anemia:                                  Cardiovascular:     Denies Pacemaker.  Denies Hypertension.   Denies MI.  Denies CAD.    Denies CABG/stent.     Denies CHF.                                   Pulmonary:    Denies COPD.  Denies Asthma.                    Renal/:   Denies Chronic Renal Disease.                Hepatic/GI:      Denies GERD.    Not Taking GLP-1 Agonists            Neurological:    Denies CVA.    Denies Seizures.                                Endocrine:  Diabetes, type 2         Morbid Obesity / BMI > 40      Physical Exam  General: Cooperative, Alert and Oriented    Airway:  Mallampati: IV   Mouth Opening: Normal  TM Distance: Normal  Tongue:  "Normal  Neck ROM: Normal ROM  Neck: Girth Increased    Dental:  Intact    Chest/Lungs:  Normal Respiratory Rate    Heart:  Rate: Normal  Rhythm: Regular Rhythm        Anesthesia Plan  Type of Anesthesia, risks & benefits discussed:    Anesthesia Type: Gen ETT  Intra-op Monitoring Plan: Standard ASA Monitors  Post Op Pain Control Plan: multimodal analgesia and IV/PO Opioids PRN  Induction:  IV  Airway Plan: Direct and Video, Post-Induction  Informed Consent: Informed consent signed with the Patient and all parties understand the risks and agree with anesthesia plan.  All questions answered.   ASA Score: 3  Day of Surgery Review of History & Physical: H&P Update referred to the surgeon/provider.    Ready For Surgery From Anesthesia Perspective.     .           [1]   Patient Active Problem List  Diagnosis    Class 3 severe obesity due to excess calories with serious comorbidity and body mass index (BMI) of 45.0 to 49.9 in adult    Type 2 diabetes mellitus with other specified complication, without long-term current use of insulin   [2]   No current facility-administered medications on file prior to encounter.     Current Outpatient Medications on File Prior to Encounter   Medication Sig Dispense Refill    ammonium lactate (LAC-HYDRIN) 12 % lotion Apply topically 2 (two) times daily.      blood sugar diagnostic Strp 1 strip by Misc.(Non-Drug; Combo Route) route 3 (three) times daily after meals. 100 strip 0    blood-glucose meter kit Use as instructed 1 each 0    blood-glucose meter Misc       etonogestrel (NEXPLANON SDRM) by Subdermal route.      hydroquinone 4 % Crea Apply topically.      lancets Misc 1 each by Misc.(Non-Drug; Combo Route) route daily as needed. 100 each 0    pen needle, diabetic (BD ULTRA-FINE JEF PEN NEEDLE) 32 gauge x 5/32" Ndle 1 each by Misc.(Non-Drug; Combo Route) route once daily. 100 each 1    PREVIDENT 5000 SENSITIVE 1.1-5 % Pste       tirzepatide 10 mg/0.5 mL PnIj Inject 10 mg into the skin " every 7 days. (Patient not taking: Reported on 5/28/2025) 2 mL 0    tretinoin (RETIN-A) 0.05 % cream Apply topically every evening.      valACYclovir (VALTREX) 500 MG tablet Take 500 mg by mouth 2 (two) times daily.     [3]   Social History  Socioeconomic History    Marital status: Single   Tobacco Use    Smoking status: Never     Passive exposure: Past    Smokeless tobacco: Former   Substance and Sexual Activity    Alcohol use: Not Currently     Alcohol/week: 4.0 standard drinks of alcohol     Types: 2 Glasses of wine, 2 Shots of liquor per week     Comment: occ    Drug use: No    Sexual activity: Yes     Partners: Male     Birth control/protection: Condom, Implant     Comment: last encounter, 2 months, lmp currentl no regular    Social History Narrative        College/university ; [employer], [employer]         Working ; sleep number ;          /Single    none        Children; none         Lives with ;self         Diet/Exericse             Social Drivers of Health     Financial Resource Strain: Low Risk  (1/23/2025)    Overall Financial Resource Strain (CARDIA)     Difficulty of Paying Living Expenses: Not very hard   Food Insecurity: Unknown (1/23/2025)    Hunger Vital Sign     Worried About Running Out of Food in the Last Year: Patient declined     Ran Out of Food in the Last Year: Never true   Transportation Needs: No Transportation Needs (6/27/2024)    TRANSPORTATION NEEDS     Transportation : No   Physical Activity: Sufficiently Active (1/23/2025)    Exercise Vital Sign     Days of Exercise per Week: 4 days     Minutes of Exercise per Session: 60 min   Stress: No Stress Concern Present (1/23/2025)    [employer] [place] [employer] - Occupational Stress Questionnaire     Feeling of Stress : Not at all   Housing Stability: High Risk (1/23/2025)    Housing Stability Vital Sign     Unable to Pay for Housing in the Last Year: Yes     Homeless in the Last Year: No

## 2025-06-16 ENCOUNTER — ANESTHESIA (OUTPATIENT)
Dept: SURGERY | Facility: HOSPITAL | Age: 30
DRG: 621 | End: 2025-06-16
Payer: COMMERCIAL

## 2025-06-16 ENCOUNTER — HOSPITAL ENCOUNTER (INPATIENT)
Facility: HOSPITAL | Age: 30
LOS: 1 days | Discharge: HOME | DRG: 621 | End: 2025-06-17
Attending: SURGERY | Admitting: SURGERY
Payer: COMMERCIAL

## 2025-06-16 DIAGNOSIS — E66.813 CLASS 3 SEVERE OBESITY DUE TO EXCESS CALORIES WITH SERIOUS COMORBIDITY AND BODY MASS INDEX (BMI) OF 45.0 TO 49.9 IN ADULT: ICD-10-CM

## 2025-06-16 DIAGNOSIS — E66.01 CLASS 3 SEVERE OBESITY DUE TO EXCESS CALORIES WITH SERIOUS COMORBIDITY AND BODY MASS INDEX (BMI) OF 45.0 TO 49.9 IN ADULT: ICD-10-CM

## 2025-06-16 DIAGNOSIS — E66.01 MORBID OBESITY WITH BODY MASS INDEX (BMI) OF 40.0 TO 49.9: Primary | ICD-10-CM

## 2025-06-16 DIAGNOSIS — E11.69 TYPE 2 DIABETES MELLITUS WITH OTHER SPECIFIED COMPLICATION, WITHOUT LONG-TERM CURRENT USE OF INSULIN: ICD-10-CM

## 2025-06-16 PROBLEM — E66.9 OBESITY: Status: ACTIVE | Noted: 2025-06-16

## 2025-06-16 LAB
B-HCG UR QL: NEGATIVE
CTP QC/QA: YES
POCT GLUCOSE: 111 MG/DL (ref 70–110)
POCT GLUCOSE: 162 MG/DL (ref 70–110)
POCT GLUCOSE: 162 MG/DL (ref 70–110)
POCT GLUCOSE: 172 MG/DL (ref 70–110)
POCT GLUCOSE: 195 MG/DL (ref 70–110)

## 2025-06-16 PROCEDURE — 63600175 PHARM REV CODE 636 W HCPCS

## 2025-06-16 PROCEDURE — 43775 LAP SLEEVE GASTRECTOMY: CPT | Mod: ,,, | Performed by: SURGERY

## 2025-06-16 PROCEDURE — 63600175 PHARM REV CODE 636 W HCPCS: Performed by: SURGERY

## 2025-06-16 PROCEDURE — 71000033 HC RECOVERY, INTIAL HOUR: Performed by: SURGERY

## 2025-06-16 PROCEDURE — 94799 UNLISTED PULMONARY SVC/PX: CPT

## 2025-06-16 PROCEDURE — 88307 TISSUE EXAM BY PATHOLOGIST: CPT | Mod: TC | Performed by: SURGERY

## 2025-06-16 PROCEDURE — 0DJ08ZZ INSPECTION OF UPPER INTESTINAL TRACT, VIA NATURAL OR ARTIFICIAL OPENING ENDOSCOPIC: ICD-10-PCS | Performed by: SURGERY

## 2025-06-16 PROCEDURE — 63600175 PHARM REV CODE 636 W HCPCS: Mod: JZ,TB | Performed by: SURGERY

## 2025-06-16 PROCEDURE — 25000003 PHARM REV CODE 250

## 2025-06-16 PROCEDURE — 82962 GLUCOSE BLOOD TEST: CPT | Performed by: SURGERY

## 2025-06-16 PROCEDURE — 25000242 PHARM REV CODE 250 ALT 637 W/ HCPCS: Performed by: SURGERY

## 2025-06-16 PROCEDURE — 36000711: Performed by: SURGERY

## 2025-06-16 PROCEDURE — 37000009 HC ANESTHESIA EA ADD 15 MINS: Performed by: SURGERY

## 2025-06-16 PROCEDURE — 27201423 OPTIME MED/SURG SUP & DEVICES STERILE SUPPLY: Performed by: SURGERY

## 2025-06-16 PROCEDURE — 36000710: Performed by: SURGERY

## 2025-06-16 PROCEDURE — 25000003 PHARM REV CODE 250: Performed by: SURGERY

## 2025-06-16 PROCEDURE — 71000015 HC POSTOP RECOV 1ST HR: Performed by: SURGERY

## 2025-06-16 PROCEDURE — 94761 N-INVAS EAR/PLS OXIMETRY MLT: CPT

## 2025-06-16 PROCEDURE — 11000001 HC ACUTE MED/SURG PRIVATE ROOM

## 2025-06-16 PROCEDURE — 71000016 HC POSTOP RECOV ADDL HR: Performed by: SURGERY

## 2025-06-16 PROCEDURE — 81025 URINE PREGNANCY TEST: CPT | Performed by: SURGERY

## 2025-06-16 PROCEDURE — C1781 MESH (IMPLANTABLE): HCPCS | Performed by: SURGERY

## 2025-06-16 PROCEDURE — 37000008 HC ANESTHESIA 1ST 15 MINUTES: Performed by: SURGERY

## 2025-06-16 PROCEDURE — 0DB64Z3 EXCISION OF STOMACH, PERCUTANEOUS ENDOSCOPIC APPROACH, VERTICAL: ICD-10-PCS | Performed by: SURGERY

## 2025-06-16 DEVICE — SEAMGUARD ESCHELON 60 MM.: Type: IMPLANTABLE DEVICE | Site: ABDOMEN | Status: FUNCTIONAL

## 2025-06-16 RX ORDER — FAMOTIDINE 10 MG/ML
20 INJECTION, SOLUTION INTRAVENOUS ONCE
Status: COMPLETED | OUTPATIENT
Start: 2025-06-16 | End: 2025-06-16

## 2025-06-16 RX ORDER — ONDANSETRON HYDROCHLORIDE 2 MG/ML
INJECTION, SOLUTION INTRAVENOUS
Status: DISCONTINUED | OUTPATIENT
Start: 2025-06-16 | End: 2025-06-16

## 2025-06-16 RX ORDER — HALOPERIDOL LACTATE 5 MG/ML
0.5 INJECTION, SOLUTION INTRAMUSCULAR EVERY 10 MIN PRN
Status: COMPLETED | OUTPATIENT
Start: 2025-06-16 | End: 2025-06-16

## 2025-06-16 RX ORDER — GABAPENTIN 250 MG/5ML
300 SOLUTION ORAL 2 TIMES DAILY
Status: DISCONTINUED | OUTPATIENT
Start: 2025-06-16 | End: 2025-06-17 | Stop reason: HOSPADM

## 2025-06-16 RX ORDER — PHENYLEPHRINE HCL IN 0.9% NACL 1 MG/10 ML
SYRINGE (ML) INTRAVENOUS
Status: DISCONTINUED | OUTPATIENT
Start: 2025-06-16 | End: 2025-06-16

## 2025-06-16 RX ORDER — HEPARIN SODIUM 5000 [USP'U]/ML
5000 INJECTION, SOLUTION INTRAVENOUS; SUBCUTANEOUS ONCE
Status: COMPLETED | OUTPATIENT
Start: 2025-06-16 | End: 2025-06-16

## 2025-06-16 RX ORDER — MIDAZOLAM HYDROCHLORIDE 1 MG/ML
INJECTION INTRAMUSCULAR; INTRAVENOUS
Status: DISCONTINUED | OUTPATIENT
Start: 2025-06-16 | End: 2025-06-16

## 2025-06-16 RX ORDER — SCOPOLAMINE 1 MG/3D
1 PATCH, EXTENDED RELEASE TRANSDERMAL ONCE
Status: DISCONTINUED | OUTPATIENT
Start: 2025-06-16 | End: 2025-06-17 | Stop reason: HOSPADM

## 2025-06-16 RX ORDER — OXYCODONE HCL 5 MG/5 ML
5 SOLUTION, ORAL ORAL EVERY 6 HOURS PRN
Status: DISCONTINUED | OUTPATIENT
Start: 2025-06-16 | End: 2025-06-17 | Stop reason: HOSPADM

## 2025-06-16 RX ORDER — KETAMINE HCL IN 0.9 % NACL 50 MG/5 ML
SYRINGE (ML) INTRAVENOUS
Status: DISCONTINUED | OUTPATIENT
Start: 2025-06-16 | End: 2025-06-16

## 2025-06-16 RX ORDER — SODIUM CHLORIDE 0.9 % (FLUSH) 0.9 %
10 SYRINGE (ML) INJECTION
Status: DISCONTINUED | OUTPATIENT
Start: 2025-06-16 | End: 2025-06-16 | Stop reason: HOSPADM

## 2025-06-16 RX ORDER — FENTANYL CITRATE 50 UG/ML
INJECTION, SOLUTION INTRAMUSCULAR; INTRAVENOUS
Status: DISCONTINUED | OUTPATIENT
Start: 2025-06-16 | End: 2025-06-16

## 2025-06-16 RX ORDER — GLUCAGON 1 MG
1 KIT INJECTION
Status: DISCONTINUED | OUTPATIENT
Start: 2025-06-16 | End: 2025-06-17 | Stop reason: HOSPADM

## 2025-06-16 RX ORDER — MUPIROCIN 20 MG/G
OINTMENT TOPICAL
Status: DISCONTINUED | OUTPATIENT
Start: 2025-06-16 | End: 2025-06-16 | Stop reason: HOSPADM

## 2025-06-16 RX ORDER — SODIUM CHLORIDE 9 MG/ML
INJECTION, SOLUTION INTRAVENOUS CONTINUOUS
Status: DISCONTINUED | OUTPATIENT
Start: 2025-06-16 | End: 2025-06-17 | Stop reason: HOSPADM

## 2025-06-16 RX ORDER — DEXAMETHASONE SODIUM PHOSPHATE 4 MG/ML
INJECTION, SOLUTION INTRA-ARTICULAR; INTRALESIONAL; INTRAMUSCULAR; INTRAVENOUS; SOFT TISSUE
Status: DISCONTINUED | OUTPATIENT
Start: 2025-06-16 | End: 2025-06-16

## 2025-06-16 RX ORDER — HYDROMORPHONE HYDROCHLORIDE 1 MG/ML
0.2 INJECTION, SOLUTION INTRAMUSCULAR; INTRAVENOUS; SUBCUTANEOUS EVERY 5 MIN PRN
Status: DISCONTINUED | OUTPATIENT
Start: 2025-06-16 | End: 2025-06-16 | Stop reason: HOSPADM

## 2025-06-16 RX ORDER — KETOROLAC TROMETHAMINE 15 MG/ML
15 INJECTION, SOLUTION INTRAMUSCULAR; INTRAVENOUS ONCE
Status: COMPLETED | OUTPATIENT
Start: 2025-06-16 | End: 2025-06-16

## 2025-06-16 RX ORDER — PROPOFOL 10 MG/ML
VIAL (ML) INTRAVENOUS
Status: DISCONTINUED | OUTPATIENT
Start: 2025-06-16 | End: 2025-06-16

## 2025-06-16 RX ORDER — ACETAMINOPHEN 10 MG/ML
1000 INJECTION, SOLUTION INTRAVENOUS ONCE
Status: COMPLETED | OUTPATIENT
Start: 2025-06-16 | End: 2025-06-16

## 2025-06-16 RX ORDER — LIDOCAINE HYDROCHLORIDE 10 MG/ML
1 INJECTION, SOLUTION EPIDURAL; INFILTRATION; INTRACAUDAL; PERINEURAL ONCE
Status: DISCONTINUED | OUTPATIENT
Start: 2025-06-16 | End: 2025-06-16 | Stop reason: HOSPADM

## 2025-06-16 RX ORDER — ACETAMINOPHEN 650 MG/20.3ML
500 LIQUID ORAL
Status: DISCONTINUED | OUTPATIENT
Start: 2025-06-16 | End: 2025-06-17 | Stop reason: HOSPADM

## 2025-06-16 RX ORDER — ACETAMINOPHEN 500 MG
1000 TABLET ORAL
Status: DISCONTINUED | OUTPATIENT
Start: 2025-06-16 | End: 2025-06-16

## 2025-06-16 RX ORDER — PROCHLORPERAZINE EDISYLATE 5 MG/ML
5 INJECTION INTRAMUSCULAR; INTRAVENOUS EVERY 6 HOURS PRN
Status: DISCONTINUED | OUTPATIENT
Start: 2025-06-16 | End: 2025-06-17 | Stop reason: HOSPADM

## 2025-06-16 RX ORDER — PANTOPRAZOLE SODIUM 40 MG/10ML
40 INJECTION, POWDER, LYOPHILIZED, FOR SOLUTION INTRAVENOUS 2 TIMES DAILY
Status: DISCONTINUED | OUTPATIENT
Start: 2025-06-16 | End: 2025-06-17 | Stop reason: HOSPADM

## 2025-06-16 RX ORDER — ACETAMINOPHEN 650 MG/20.3ML
1000 LIQUID ORAL EVERY 8 HOURS
Status: DISCONTINUED | OUTPATIENT
Start: 2025-06-16 | End: 2025-06-17 | Stop reason: HOSPADM

## 2025-06-16 RX ORDER — LIDOCAINE HYDROCHLORIDE 20 MG/ML
INJECTION, SOLUTION EPIDURAL; INFILTRATION; INTRACAUDAL; PERINEURAL
Status: DISCONTINUED | OUTPATIENT
Start: 2025-06-16 | End: 2025-06-16

## 2025-06-16 RX ORDER — SODIUM CHLORIDE, SODIUM LACTATE, POTASSIUM CHLORIDE, CALCIUM CHLORIDE 600; 310; 30; 20 MG/100ML; MG/100ML; MG/100ML; MG/100ML
INJECTION, SOLUTION INTRAVENOUS CONTINUOUS
Status: DISCONTINUED | OUTPATIENT
Start: 2025-06-16 | End: 2025-06-17

## 2025-06-16 RX ORDER — INSULIN ASPART 100 [IU]/ML
0-5 INJECTION, SOLUTION INTRAVENOUS; SUBCUTANEOUS EVERY 6 HOURS PRN
Status: DISCONTINUED | OUTPATIENT
Start: 2025-06-16 | End: 2025-06-17 | Stop reason: HOSPADM

## 2025-06-16 RX ORDER — ROCURONIUM BROMIDE 10 MG/ML
INJECTION, SOLUTION INTRAVENOUS
Status: DISCONTINUED | OUTPATIENT
Start: 2025-06-16 | End: 2025-06-16

## 2025-06-16 RX ORDER — ENOXAPARIN SODIUM 100 MG/ML
40 INJECTION SUBCUTANEOUS EVERY 12 HOURS
Status: DISCONTINUED | OUTPATIENT
Start: 2025-06-16 | End: 2025-06-17 | Stop reason: HOSPADM

## 2025-06-16 RX ORDER — GLUCAGON 1 MG
1 KIT INJECTION
Status: DISCONTINUED | OUTPATIENT
Start: 2025-06-16 | End: 2025-06-16 | Stop reason: HOSPADM

## 2025-06-16 RX ORDER — ONDANSETRON HYDROCHLORIDE 2 MG/ML
8 INJECTION, SOLUTION INTRAVENOUS EVERY 6 HOURS
Status: DISCONTINUED | OUTPATIENT
Start: 2025-06-16 | End: 2025-06-17 | Stop reason: HOSPADM

## 2025-06-16 RX ADMIN — GABAPENTIN 300 MG: 250 SOLUTION ORAL at 08:06

## 2025-06-16 RX ADMIN — Medication 30 MG: at 08:06

## 2025-06-16 RX ADMIN — SODIUM CHLORIDE: 9 INJECTION, SOLUTION INTRAVENOUS at 06:06

## 2025-06-16 RX ADMIN — PROPOFOL 20 MG: 10 INJECTION, EMULSION INTRAVENOUS at 08:06

## 2025-06-16 RX ADMIN — KETOROLAC TROMETHAMINE 15 MG: 15 INJECTION INTRAMUSCULAR at 06:06

## 2025-06-16 RX ADMIN — HYDROMORPHONE HYDROCHLORIDE 0.2 MG: 1 INJECTION, SOLUTION INTRAMUSCULAR; INTRAVENOUS; SUBCUTANEOUS at 09:06

## 2025-06-16 RX ADMIN — GABAPENTIN 300 MG: 250 SOLUTION ORAL at 09:06

## 2025-06-16 RX ADMIN — Medication 200 MCG: at 07:06

## 2025-06-16 RX ADMIN — PANTOPRAZOLE SODIUM 40 MG: 40 INJECTION, POWDER, LYOPHILIZED, FOR SOLUTION INTRAVENOUS at 08:06

## 2025-06-16 RX ADMIN — HALOPERIDOL LACTATE 0.5 MG: 5 INJECTION, SOLUTION INTRAMUSCULAR at 09:06

## 2025-06-16 RX ADMIN — FENTANYL CITRATE 100 MCG: 50 INJECTION INTRAMUSCULAR; INTRAVENOUS at 07:06

## 2025-06-16 RX ADMIN — PROCHLORPERAZINE EDISYLATE 5 MG: 5 INJECTION INTRAMUSCULAR; INTRAVENOUS at 02:06

## 2025-06-16 RX ADMIN — Medication 150 MCG: at 07:06

## 2025-06-16 RX ADMIN — ROCURONIUM BROMIDE 50 MG: 10 INJECTION, SOLUTION INTRAVENOUS at 07:06

## 2025-06-16 RX ADMIN — OXYCODONE HYDROCHLORIDE 5 MG: 5 SOLUTION ORAL at 04:06

## 2025-06-16 RX ADMIN — SODIUM CHLORIDE 3 G: 9 INJECTION, SOLUTION INTRAVENOUS at 07:06

## 2025-06-16 RX ADMIN — MIDAZOLAM HYDROCHLORIDE 2 MG: 2 INJECTION, SOLUTION INTRAMUSCULAR; INTRAVENOUS at 06:06

## 2025-06-16 RX ADMIN — PANTOPRAZOLE SODIUM 40 MG: 40 INJECTION, POWDER, LYOPHILIZED, FOR SOLUTION INTRAVENOUS at 09:06

## 2025-06-16 RX ADMIN — SODIUM CHLORIDE, SODIUM GLUCONATE, SODIUM ACETATE, POTASSIUM CHLORIDE, MAGNESIUM CHLORIDE, SODIUM PHOSPHATE, DIBASIC, AND POTASSIUM PHOSPHATE: .53; .5; .37; .037; .03; .012; .00082 INJECTION, SOLUTION INTRAVENOUS at 07:06

## 2025-06-16 RX ADMIN — SODIUM CHLORIDE, POTASSIUM CHLORIDE, SODIUM LACTATE AND CALCIUM CHLORIDE: 600; 310; 30; 20 INJECTION, SOLUTION INTRAVENOUS at 09:06

## 2025-06-16 RX ADMIN — SIMETHICONE 40 MG: 20 SUSPENSION/ DROPS ORAL at 09:06

## 2025-06-16 RX ADMIN — ONDANSETRON 8 MG: 2 INJECTION INTRAMUSCULAR; INTRAVENOUS at 11:06

## 2025-06-16 RX ADMIN — HEPARIN SODIUM 5000 UNITS: 5000 INJECTION INTRAVENOUS; SUBCUTANEOUS at 06:06

## 2025-06-16 RX ADMIN — FAMOTIDINE 20 MG: 10 INJECTION, SOLUTION INTRAVENOUS at 06:06

## 2025-06-16 RX ADMIN — SUGAMMADEX 600 MG: 100 INJECTION, SOLUTION INTRAVENOUS at 08:06

## 2025-06-16 RX ADMIN — ACETAMINOPHEN 1000 MG: 10 INJECTION, SOLUTION INTRAVENOUS at 06:06

## 2025-06-16 RX ADMIN — DEXAMETHASONE SODIUM PHOSPHATE 8 MG: 4 INJECTION INTRA-ARTICULAR; INTRALESIONAL; INTRAMUSCULAR; INTRAVENOUS; SOFT TISSUE at 07:06

## 2025-06-16 RX ADMIN — LIDOCAINE HYDROCHLORIDE 100 MG: 20 INJECTION, SOLUTION EPIDURAL; INFILTRATION; INTRACAUDAL at 07:06

## 2025-06-16 RX ADMIN — ONDANSETRON 4 MG: 2 INJECTION INTRAMUSCULAR; INTRAVENOUS at 08:06

## 2025-06-16 RX ADMIN — PROPOFOL 200 MG: 10 INJECTION, EMULSION INTRAVENOUS at 07:06

## 2025-06-16 RX ADMIN — Medication 100 MCG: at 07:06

## 2025-06-16 RX ADMIN — ENOXAPARIN SODIUM 40 MG: 40 INJECTION SUBCUTANEOUS at 08:06

## 2025-06-16 RX ADMIN — ONDANSETRON 8 MG: 2 INJECTION INTRAMUSCULAR; INTRAVENOUS at 05:06

## 2025-06-16 RX ADMIN — FENTANYL CITRATE 25 MCG: 50 INJECTION INTRAMUSCULAR; INTRAVENOUS at 08:06

## 2025-06-16 RX ADMIN — ROCURONIUM BROMIDE 30 MG: 10 INJECTION, SOLUTION INTRAVENOUS at 07:06

## 2025-06-16 RX ADMIN — SCOLOPAMINE TRANSDERMAL SYSTEM 1 PATCH: 1 PATCH, EXTENDED RELEASE TRANSDERMAL at 06:06

## 2025-06-16 RX ADMIN — ACETAMINOPHEN 999.01 MG: 650 SOLUTION ORAL at 02:06

## 2025-06-16 RX ADMIN — HYDROMORPHONE HYDROCHLORIDE 0.2 MG: 1 INJECTION, SOLUTION INTRAMUSCULAR; INTRAVENOUS; SUBCUTANEOUS at 10:06

## 2025-06-16 RX ADMIN — OXYCODONE HYDROCHLORIDE 5 MG: 5 SOLUTION ORAL at 09:06

## 2025-06-16 NOTE — ANESTHESIA PROCEDURE NOTES
Intubation    Date/Time: 6/16/2025 7:14 AM    Performed by: Juliet Rodriguez MD  Authorized by: Steven Hamilton MD    Intubation:     Induction:  Intravenous    Intubated:  Postinduction    Mask Ventilation:  Very difficult with oral airway (required two hand mask with two provider ventilation)    Attempts:  1    Attempted By:  Resident anesthesiologist    Method of Intubation:  Video laryngoscopy    Blade:  [place] 3    Laryngeal View Grade: Grade I - full view of cords      Difficult Airway Encountered?: No      Complications:  None    Airway Device:  Oral endotracheal tube    Airway Device Size:  7.0    Style/Cuff Inflation:  Cuffed    Tube secured:  21    Secured at:  The teeth    Placement Verified By:  Capnometry    Complicating Factors:  Obesity and small mouth    Findings Post-Intubation:  BS equal bilateral

## 2025-06-16 NOTE — NURSING TRANSFER
Nursing Transfer Note      6/16/2025   10:30 AM    Nurse giving handoff: Celena   Nurse receiving handoff:Lucinda     Transfer From: PACU    Transfer via stretcher    Transfer TO: 541    Transported by PCT    Any special needs or follow-up needed: None    Patient belongings transferred with patient: No    Chart send with patient: Yes    Notified: Mother    Patient reassessed at: 9:53

## 2025-06-16 NOTE — OP NOTE
DATE OF PROCEDURE: 6/16/2025    PRE OP DIAGNOSIS: Morbid obesity with body mass index (BMI) of 40.0 to 49.9 [E66.01]  Type 2 diabetes mellitus with other specified complication, without long-term current use of insulin [E11.69]    POST OP DIAGNOSIS: Morbid obesity with body mass index (BMI) of 40.0 to 49.9 [E66.01]  Type 2 diabetes mellitus with other specified complication, without long-term current use of insulin [E11.69]    PROCEDURE: Procedure(s) (LRB):  GASTRECTOMY, SLEEVE, LAPAROSCOPIC with intraop EGD (N/A)  BLOCK, TRANSVERSUS ABDOMINIS PLANE (Bilateral)     Surgeons and Role:     * Meenakshi Wright MD - Primary     * Jose Elias Calvin MD - Resident - Chief     * Inna Villa MD - Resident - Assisting    ANESTHESIA: General    INDICATION: Patient is a 29 year-old morbidly obese female with BMI 45.45 kg/m² and multiple associated comorbidities including NIDDM2. She meets NIH consensus criteria for bariatric surgery and completed the [facility] program. After discussing the risks, benefits and alternatives to bariatric surgery, she elected to proceed with laparoscopic sleeve gastrectomy. Informed consent was obtained.     FINDINGS:  Sleeve gastrectomy performed over 40Fr Bougie     DESCRIPTION OF OPERATION: The patient was met in the pre-op area and her identity and consent confirmed. She was then brought back to the Operating Room and placed in the supine position. General anesthesia was induced and she was intubated without complication. SCDs and a warming blanket were placed and pre-operative antibiotics were infused within 30 minutes of the incision. The abdomen was prepped and draped in sterile fashion and a pre-procedural pause was performed by all members of the surgical and anesthesia teams. Access to peritoneum was gained 15 cm below the xiphoid to the left of midline using an 11-mm Optiview trocar under direct vision. Pneumoperitoneum to 15 mmHg with CO2 gas was obtained. Inspection yielded  no injury. Bilateral transversus abdominis plane blocks were performed under direct vision instilling 10 mL 0.25% bupivacaine on each side. Three 5-mm trocars were placed, laterally subcostal on each side and midclavicular subcostal on the right, and a 12-mm trocar placed 1 handbreadth to the right of the camera port. The liver retractor was placed via the right lateral trocar. The greater curve was taken down starting 6 cm from the pylorus going all the way to the base of the left nery taking all posterior gastric attachments with the Harmonic scalpel. A 40-Yoruba bougie was passed towards the pylorus and the stomach was resected along the bougie starting 6 cm from the pylorus and coming out just a little bit at the angle of His. This was done using one green staple cartridge at the antrum and the remainder blue staples, all reinforced with Seam Guard. The bougie was removed. Endoscopy was performed. The sleeve appeared appropriate in size and configuration and there were no air leaks seen. The air was aspirated from the sleeve and the endoscope was withdrawn. The gastrectomy specimen was removed through the 12-mm trocar site which was closed with a figure-of-eight 0-vicryl suture using the [place] Saba device and the wound irrigated. The liver retractor was removed. The remaining trocars were removed under direct vision; prior to removing the last trocar, the pneumoperitoneum was allowed to escape. The skin incisions were closed with 4-0 Monocryl, and reinforced with Dermabond. The patient was awoken from anesthesia and extubated. She was brought to the PACU in good condition having tolerated the operation well. Surgical sponge and needle counts were correct at the end of the case.      EBL: 2 mL  Specimen: Portion of stomach  Drains: None  Complications: None apparent  Dispo: Surgical floor     I was present and scrubbed for the entirety of this operation.      Meenakshi Mcconnell  6/16/2025

## 2025-06-16 NOTE — NURSING
Pt arrived on the POSS unit from PACU in a bed via patient transport at 10:50. Pt awake, AAOx's4, following commands and immediately requesting ambulation to the restroom. Pt ambulated to the bathroom and successfully voided without complications. Pt educated on the importance of controlling nausea, a goal of reaching a comfortable pain level, fluid intake, ambulation and incentive spirometry use among other things for which both her and her mother (at bedside) expressed  an understanding of. VSS and complaints of minimal pain and no nausea upon arrival to the unit. Water protocol initiated at 11:00. Some slight nausea reported at the very beginning which dissipated. So far, tolerating clears without nausea or vomiting.

## 2025-06-16 NOTE — PLAN OF CARE
Problem: Bariatric Environmental Safety  Goal: Safety Maintained with Care  Outcome: Progressing     Problem: Wound  Goal: Optimal Wound Healing  Outcome: Progressing  Intervention: Promote Wound Healing  Flowsheets (Taken 6/16/2025 1847)  Sleep/Rest Enhancement: relaxation techniques promoted     Problem: Diabetes Comorbidity  Goal: Blood Glucose Level Within Targeted Range  Outcome: Progressing  Intervention: Monitor and Manage Glycemia  Flowsheets (Taken 6/16/2025 1847)  Glycemic Management: blood glucose monitored

## 2025-06-16 NOTE — BRIEF OP NOTE
[facility] - Surgery ([facility])  Brief Operative Note    SUMMARY     Surgery Date: 6/16/2025     Surgeons and Role:     * Meenakshi Wright MD - Primary     * Jose Elias Calvin MD - Resident - Assisting     * Inna Villa MD - Resident - Assisting        Pre-op Diagnosis:  Type 2 diabetes mellitus with other specified complication, without long-term current use of insulin [E11.69]  Morbid obesity with body mass index (BMI) of 40.0 to 49.9 [E66.01]    Post-op Diagnosis:  Post-Op Diagnosis Codes:     * Type 2 diabetes mellitus with other specified complication, without long-term current use of insulin [E11.69]     * Morbid obesity with body mass index (BMI) of 40.0 to 49.9 [E66.01]    Procedure(s) (LRB):  GASTRECTOMY, SLEEVE, LAPAROSCOPIC with intraop EGD (N/A)    Anesthesia: General/Regional    Implants:  Implant Name Type Inv. Item Serial No.  Lot No. LRB No. Used Action   SEAMGUARD ESCHELON 60 MM. - [id number]  SEAMGUARD ESCHELON 60 MM.  [employer] [id number] N/A 1 Implanted   SEAMGUARD ESCHELON 60 MM. - [id number]  SEAMGUARD ESCHELON 60 MM.  [employer] [id number] N/A 1 Implanted   SEAMGUARD ESCHELON 60 MM. - [id number]  SEAMGUARD ESCHELON 60 MM.  [employer] [id number] N/A 1 Implanted   SEAMGUARD ESCHELON 60 MM. - [id number]  SEAMGUARD ESCHELON 60 MM.  [employer] [id number] N/A 1 Implanted       Operative Findings: Laparoscopic sleeve gastrectomy without issue. Intra-operative EGD.     Estimated Blood Loss: 8mL    Estimated Blood Loss has been documented.         Specimens:   Specimen (24h ago, onward)       Start     Ordered    06/16/25 0811  Specimen to Pathology General Surgery  RELEASE UPON ORDERING        References:    Click here for ordering Quick Tip   Question:  Release to patient  Answer:  Immediate    06/16/25 0811                  ID Type Source Tests Collected by Time Destination   1 : 1. Stomach - PERM Tissue Stomach SPECIMEN TO PATHOLOGY Meenakshi Wright MD 6/16/2025 0811         [id number]

## 2025-06-16 NOTE — TRANSFER OF CARE
"Anesthesia Transfer of Care Note    Patient: Karen Paredes    Procedure(s) Performed: Procedure(s) (LRB):  GASTRECTOMY, SLEEVE, LAPAROSCOPIC with intraop EGD (N/A)    Patient location: PACU    Anesthesia Type: general    Transport from OR: Transported from OR on 6-10 L/min O2 by face mask with adequate spontaneous ventilation    Post pain: adequate analgesia    Post assessment: no apparent anesthetic complications and tolerated procedure well    Post vital signs: stable    Level of consciousness: responds to stimulation    Nausea/Vomiting: no nausea/vomiting    Complications: none    Transfer of care protocol was followed      Last vitals: Visit Vitals  /79 (BP Location: Left arm, Patient Position: Lying)   Pulse 95   Temp 37.1 °C (98.8 °F) (Temporal)   Resp 18   Ht 5' 1" (1.549 m)   Wt 107.8 kg (237 lb 10.5 oz)   LMP 06/02/2025 (Approximate)   SpO2 98%   Breastfeeding No   BMI 44.90 kg/m²     " 1

## 2025-06-17 ENCOUNTER — DOCUMENTATION ONLY (OUTPATIENT)
Dept: BARIATRICS | Facility: CLINIC | Age: 30
End: 2025-06-17
Payer: COMMERCIAL

## 2025-06-17 VITALS
DIASTOLIC BLOOD PRESSURE: 84 MMHG | WEIGHT: 237.63 LBS | HEIGHT: 61 IN | OXYGEN SATURATION: 99 % | SYSTOLIC BLOOD PRESSURE: 137 MMHG | HEART RATE: 78 BPM | RESPIRATION RATE: 18 BRPM | TEMPERATURE: 98 F | BODY MASS INDEX: 44.87 KG/M2

## 2025-06-17 LAB
ABSOLUTE EOSINOPHIL (OHS): 0 K/UL
ABSOLUTE MONOCYTE (OHS): 1.27 K/UL (ref 0.3–1)
ABSOLUTE NEUTROPHIL COUNT (OHS): 11.38 K/UL (ref 1.8–7.7)
ANION GAP (OHS): 9 MMOL/L (ref 8–16)
BASOPHILS # BLD AUTO: 0.02 K/UL
BASOPHILS NFR BLD AUTO: 0.1 %
BUN SERPL-MCNC: 3 MG/DL (ref 6–20)
CALCIUM SERPL-MCNC: 9.3 MG/DL (ref 8.7–10.5)
CHLORIDE SERPL-SCNC: 102 MMOL/L (ref 95–110)
CO2 SERPL-SCNC: 23 MMOL/L (ref 23–29)
CREAT SERPL-MCNC: 0.7 MG/DL (ref 0.5–1.4)
ERYTHROCYTE [DISTWIDTH] IN BLOOD BY AUTOMATED COUNT: 14.2 % (ref 11.5–14.5)
GFR SERPLBLD CREATININE-BSD FMLA CKD-EPI: >60 ML/MIN/1.73/M2
GLUCOSE SERPL-MCNC: 105 MG/DL (ref 70–110)
HCT VFR BLD AUTO: 38.1 % (ref 37–48.5)
HGB BLD-MCNC: 12 GM/DL (ref 12–16)
IMM GRANULOCYTES # BLD AUTO: 0.1 K/UL (ref 0–0.04)
IMM GRANULOCYTES NFR BLD AUTO: 0.7 % (ref 0–0.5)
LYMPHOCYTES # BLD AUTO: 1.23 K/UL (ref 1–4.8)
MAGNESIUM SERPL-MCNC: 1.8 MG/DL (ref 1.6–2.6)
MCH RBC QN AUTO: 25 PG (ref 27–31)
MCHC RBC AUTO-ENTMCNC: 31.5 G/DL (ref 32–36)
MCV RBC AUTO: 79 FL (ref 82–98)
NUCLEATED RBC (/100WBC) (OHS): 0 /100 WBC
PHOSPHATE SERPL-MCNC: 2.3 MG/DL (ref 2.7–4.5)
PLATELET # BLD AUTO: 339 K/UL (ref 150–450)
PMV BLD AUTO: 12.1 FL (ref 9.2–12.9)
POCT GLUCOSE: 124 MG/DL (ref 70–110)
POCT GLUCOSE: 131 MG/DL (ref 70–110)
POTASSIUM SERPL-SCNC: 3.9 MMOL/L (ref 3.5–5.1)
RBC # BLD AUTO: 4.8 M/UL (ref 4–5.4)
RELATIVE EOSINOPHIL (OHS): 0 %
RELATIVE LYMPHOCYTE (OHS): 8.8 % (ref 18–48)
RELATIVE MONOCYTE (OHS): 9.1 % (ref 4–15)
RELATIVE NEUTROPHIL (OHS): 81.3 % (ref 38–73)
SODIUM SERPL-SCNC: 134 MMOL/L (ref 136–145)
WBC # BLD AUTO: 14 K/UL (ref 3.9–12.7)

## 2025-06-17 PROCEDURE — 63600175 PHARM REV CODE 636 W HCPCS: Performed by: SURGERY

## 2025-06-17 PROCEDURE — 25000003 PHARM REV CODE 250: Performed by: COMMUNITY HEALTH WORKER

## 2025-06-17 PROCEDURE — 84100 ASSAY OF PHOSPHORUS: CPT | Performed by: SURGERY

## 2025-06-17 PROCEDURE — 85025 COMPLETE CBC W/AUTO DIFF WBC: CPT | Performed by: SURGERY

## 2025-06-17 PROCEDURE — 25000003 PHARM REV CODE 250: Performed by: SURGERY

## 2025-06-17 PROCEDURE — 83735 ASSAY OF MAGNESIUM: CPT | Performed by: SURGERY

## 2025-06-17 PROCEDURE — 36415 COLL VENOUS BLD VENIPUNCTURE: CPT | Performed by: SURGERY

## 2025-06-17 PROCEDURE — 82374 ASSAY BLOOD CARBON DIOXIDE: CPT | Performed by: SURGERY

## 2025-06-17 RX ORDER — SCOPOLAMINE 1 MG/3D
1 PATCH, EXTENDED RELEASE TRANSDERMAL ONCE
Qty: 1 PATCH | Refills: 0 | Status: CANCELLED | OUTPATIENT
Start: 2025-06-17 | End: 2025-06-17

## 2025-06-17 RX ORDER — SODIUM,POTASSIUM PHOSPHATES 280-250MG
2 POWDER IN PACKET (EA) ORAL ONCE
Status: COMPLETED | OUTPATIENT
Start: 2025-06-17 | End: 2025-06-17

## 2025-06-17 RX ADMIN — POTASSIUM & SODIUM PHOSPHATES POWDER PACK 280-160-250 MG 2 PACKET: 280-160-250 PACK at 01:06

## 2025-06-17 RX ADMIN — SIMETHICONE 40 MG: 20 SUSPENSION/ DROPS ORAL at 05:06

## 2025-06-17 RX ADMIN — GABAPENTIN 300 MG: 250 SOLUTION ORAL at 10:06

## 2025-06-17 RX ADMIN — PANTOPRAZOLE SODIUM 40 MG: 40 INJECTION, POWDER, LYOPHILIZED, FOR SOLUTION INTRAVENOUS at 08:06

## 2025-06-17 RX ADMIN — ENOXAPARIN SODIUM 40 MG: 40 INJECTION SUBCUTANEOUS at 09:06

## 2025-06-17 RX ADMIN — SIMETHICONE 40 MG: 20 SUSPENSION/ DROPS ORAL at 09:06

## 2025-06-17 RX ADMIN — ONDANSETRON 8 MG: 2 INJECTION INTRAMUSCULAR; INTRAVENOUS at 05:06

## 2025-06-17 RX ADMIN — ONDANSETRON 8 MG: 2 INJECTION INTRAMUSCULAR; INTRAVENOUS at 12:06

## 2025-06-17 NOTE — PLAN OF CARE
[facility] - Surgery  Discharge Final Note    Primary Care Provider: Kaleb Mchugh III, MD    Expected Discharge Date: 6/17/2025    Final Discharge Note (most recent)       Final Note - 06/17/25 1641          Final Note    Assessment Type Final Discharge Note     Anticipated Discharge Disposition Home or Self Care     Hospital Resources/Appts/Education Provided Provided patient/caregiver with written discharge plan information;Provided education on problems/symptoms using teachback;Appointments scheduled and added to AVS                   Future Appointments   Date Time Provider Department Center   6/23/2025 10:00 AM [facility]   7/1/2025 11:00 AM LAB, APPOINTMENT [facility] LAB [facility]   7/1/2025 11:30 AM [facility]   7/1/2025 12:00 PM Claire Isabell [facility]   8/12/2025  9:00 AM LAB, APPOINTMENT [facility]   8/12/2025  9:30 AM Génesis Ortiz, NP [facility]   8/12/2025 10:00 AM Isabell Rangel [facility]   11/4/2025  8:30 AM Philip Hooper, OD [facility] [place]   12/16/2025  9:30 AM LAB, APPOINTMENT [facility]   12/16/2025 10:00 AM Génesis Ortiz, NP [facility]         Contact Info       Meenakshi Wright MD   Specialty: General Surgery, Bariatrics    [address] [facility] [address]   Phone: [phone]       Next Steps: Follow up in 2 week(s)

## 2025-06-17 NOTE — DISCHARGE SUMMARY
[facility] - Surgery  General Surgery  Discharge Summary      Patient Name: Karen Paredes  MRN: [id number]  Admission Date: 6/16/2025  Hospital Length of Stay: 1 days  Discharge Date and Time: No discharge date for patient encounter.  Attending Physician: Meenakshi Wright*   Discharging Provider: Aniyah Green MD  Primary Care Provider: Kaleb Mchugh III, MD    HPI:   Karen Paredes is a 29 year-old morbidly obese female with BMI 45.45 kg/m² and multiple associated comorbidities including NIDDM2, who presents for pre-operative exam for weight loss surgery. She has failed multiple attempts at non-surgical methods of weight loss and has completed the [facility] Bariatric Surgery program which she started on 6/25/24 at which time her BMI was 45.90 kg/m². She meets NIH consensus criteria for bariatric surgery and presents for in undergoing laparoscopic sleeve     Labs 8/2/24: Hgb 11.7, HbA1c 6.2%, Vit D 13    Procedure(s) (LRB):  GASTRECTOMY, SLEEVE, LAPAROSCOPIC with intraop EGD (N/A)      Indwelling Lines/Drains at time of discharge:   Lines/Drains/Airways       None                 Hospital Course: Sp lap sleeve on 6/16  Uncomplicated surgery  Tolerating appropriate diet, pain controlled on oral pain meds, ambulating at baseline  Fu in 2 weeks in clinic  Meets all criteria for discharge      Goals of Care Treatment Preferences:  Code Status: Full Code      Consults:     Significant Diagnostic Studies: Labs: CMP   Recent Labs   Lab 06/17/25  0947   *   K 3.9      CO2 23      BUN 3*   CREATININE 0.7   CALCIUM 9.3   ANIONGAP 9    and CBC   Recent Labs   Lab 06/17/25  0947   WBC 14.00*   HGB 12.0   HCT 38.1          Pending Diagnostic Studies:       Procedure Component Value Units Date/Time    Basic Metabolic Panel [[id number]]     Order Status: Sent Lab Status: No result     Specimen: Blood     CBC Auto Differential [[id number]]     Order Status: Sent Lab Status: No result     Specimen:  Blood     Narrative:      The following orders were created for panel order CBC Auto Differential.  Procedure                               Abnormality         Status                     ---------                               -----------         ------                     CBC with Differential[[id number]]                                                        Please view results for these tests on the individual orders.    CBC with Differential [[id number]]     Order Status: Sent Lab Status: No result     Specimen: Blood     Magnesium [[id number]]     Order Status: Sent Lab Status: No result     Specimen: Blood     Phosphorus [[id number]]     Order Status: Sent Lab Status: No result     Specimen: Blood           Final Active Diagnoses:    Diagnosis Date Noted POA    PRINCIPAL PROBLEM:  Obesity [E66.9] 06/16/2025 Yes      Problems Resolved During this Admission:      Discharged Condition: good    Disposition: Home or Self Care    Follow Up:   Follow-up Information       Meenakshi Wright MD Follow up in 2 week(s).    Specialties: General Surgery, Bariatrics  Contact information:  [facility] [address]  [phone]                           Patient Instructions:      Lifting restrictions   Order Comments: No lifting greater than 10 pounds for 6 weeks from day of surgery.  No pushing/pulling such as vacuuming or raking.  No straining, avoid constipation and take stool softeners as described and laxatives as needed.  No driving while on narcotics and until you can react quickly without pain.     No dressing needed   Order Comments: WOUND CARE  You have skin glue over your incision(s)  This will slowly flake away in about 10 days  It is okay to shower starting the day after surgery  Can pat your incision dry  Please do not scrub hard over your incisions  Please do not pick the glue off or it will reopen the wound     Notify your health care provider if you experience any of the following:   "temperature >100.4     Notify your health care provider if you experience any of the following:  severe uncontrolled pain     Notify your health care provider if you experience any of the following:  redness, tenderness, or signs of infection (pain, swelling, redness, odor or green/yellow discharge around incision site)     Medications:    **please crush or take liquid meds if larger than size of pencil eraser until post op visit**  Reconciled Home Medications:      Medication List        CONTINUE taking these medications      acetaminophen 500 mg powder  Commonly known as: Tylenol  Take 2 packets (1,000 mg total) by mouth every 8 (eight) hours for at least 3 days and up to 5 days as needed. May take one additional 500mg dose (1 packet) per day for breakthrough pain. Do not exceed 4g in 24 hours.     ammonium lactate 12 % lotion  Commonly known as: LAC-HYDRIN  Apply topically 2 (two) times daily.     BD ULTRA-FINE JEF PEN NEEDLE 32 gauge x 5/32" Ndle  Generic drug: pen needle, diabetic  1 each by Misc.(Non-Drug; Combo Route) route once daily.     blood sugar diagnostic Strp  1 strip by Misc.(Non-Drug; Combo Route) route 3 (three) times daily after meals.     * blood-glucose meter Misc     * blood-glucose meter kit  Use as instructed     gabapentin 300 MG capsule  Commonly known as: NEURONTIN  Take 1 cap by mouth 2 times daily. Open capsule and empty prior to taking. Take one dose on morning of surgery prior to arrival. Take 1 cap twice a day for 3 days post-op. Continue for an additional 2 days as needed.     hydroquinone 4 % Crea  Apply topically.     lancets Misc  1 each by Misc.(Non-Drug; Combo Route) route daily as needed.     NEXPLANON SDRM  by Subdermal route.     omeprazole 40 MG capsule  Commonly known as: PRILOSEC  Take 1 capsule (40 mg total) by mouth every morning. Open capsule and sprinkle granules for 2 weeks post-op     ondansetron 8 MG Tbdl  Commonly known as: ZOFRAN-ODT  Dissolve 1 tablet (8 mg " total) by mouth every 6 (six) hours as needed (nausea).     PREVIDENT 5000 SENSITIVE 1.1-5 % Pste  Generic drug: sodium fluoride-pot nitrate     tretinoin 0.05 % cream  Commonly known as: RETIN-A  Apply topically every evening.     ursodioL 500 MG tablet  Commonly known as: ILANA FORTE  Take 1 tablet (500 mg total) by mouth once daily.     valACYclovir 500 MG tablet  Commonly known as: VALTREX  Take 500 mg by mouth 2 (two) times daily.           * This list has 2 medication(s) that are the same as other medications prescribed for you. Read the directions carefully, and ask your doctor or other care provider to review them with you.                Time spent on the discharge of patient: 15 minutes    Aniyah Green MD  General Surgery  [facility] - Surgery

## 2025-06-17 NOTE — SUBJECTIVE & OBJECTIVE
Interval History: NAOE. Afebrile, HDS. Tolerating water protocol, though does report some nausea with liquid tylenol. Pain overall well controlled. Ambulating. Labs pending this morning.     Medications:  Continuous Infusions:   0.9% NaCl   Intravenous Continuous 10 mL/hr at 06/16/25 0654 New Bag at 06/16/25 0654    lactated ringers   Intravenous Continuous 125 mL/hr at 06/16/25 1036 Rate Verify at 06/16/25 1036     Scheduled Meds:   acetaminophen  999.0148 mg Oral Q8H    enoxparin  40 mg Subcutaneous Q12H    gabapentin  300 mg Oral BID    ondansetron  8 mg Intravenous Q6H    pantoprazole  40 mg Intravenous BID    scopolamine  1 patch Transdermal Once     PRN Meds:  Current Facility-Administered Medications:     acetaminophen, 499.5074 mg, Oral, Q24H PRN    dextrose 50%, 12.5 g, Intravenous, PRN    glucagon (human recombinant), 1 mg, Intramuscular, PRN    insulin aspart U-100, 0-5 Units, Subcutaneous, Q6H PRN    oxyCODONE, 5 mg, Oral, Q6H PRN    prochlorperazine, 5 mg, Intravenous, Q6H PRN    simethicone, 40 mg, Oral, QID PRN     Review of patient's allergies indicates:  No Known Allergies  Objective:     Vital Signs (Most Recent):  Temp: 99.1 °F (37.3 °C) (06/17/25 0715)  Pulse: 72 (06/17/25 0715)  Resp: 18 (06/17/25 0715)  BP: 134/88 (06/17/25 0715)  SpO2: 100 % (06/17/25 0715) Vital Signs (24h Range):  Temp:  [97.4 °F (36.3 °C)-99.1 °F (37.3 °C)] 99.1 °F (37.3 °C)  Pulse:  [] 72  Resp:  [16-28] 18  SpO2:  [97 %-100 %] 100 %  BP: (106-140)/(53-88) 134/88     Weight: 107.8 kg (237 lb 10.5 oz)  Body mass index is 44.9 kg/m².    Intake/Output - Last 3 Shifts         06/15 0700  06/16 0659 06/16 0700  06/17 0659 06/17 0700  06/18 0659    I.V. (mL/kg)  197.1 (1.8)     IV Piggyback  1400     Total Intake(mL/kg)  1597.1 (14.8)     Net  +1597.1            Unmeasured Urine Occurrence  3 x              Physical Exam  Constitutional:       General: She is not in acute distress.  HENT:      Head: Normocephalic and  "atraumatic.   Eyes:      Conjunctiva/sclera: Conjunctivae normal.   Cardiovascular:      Rate and Rhythm: Normal rate and regular rhythm.   Pulmonary:      Effort: Pulmonary effort is normal. No respiratory distress.   Abdominal:      General: There is no distension.      Palpations: Abdomen is soft.      Comments: Surgical incisions c/d/I   Abdomen appropriately tender to palpation     Musculoskeletal:         General: No swelling.   Skin:     General: Skin is warm and dry.      Capillary Refill: Capillary refill takes less than 2 seconds.   Neurological:      General: No focal deficit present.      Mental Status: She is alert.          Significant Labs:  I have reviewed all pertinent lab results within the past 24 hours.  CBC: No results for input(s): "WBC", "RBC", "HGB", "HCT", "PLT", "MCV", "MCH", "MCHC" in the last 168 hours.  CMP: No results for input(s): "GLU", "CALCIUM", "ALBUMIN", "PROT", "NA", "K", "CO2", "CL", "BUN", "CREATININE", "ALKPHOS", "ALT", "AST", "BILITOT" in the last 168 hours.    Significant Diagnostics:  I have reviewed all pertinent imaging results/findings within the past 24 hours.  "

## 2025-06-17 NOTE — DISCHARGE INSTRUCTIONS
WOUND CARE  You have skin glue over your incision(s); this will slowly flake away over the next few weeks.  -- Ok to shower; however, no baths or submerging in water (I.e. swimming, submerging in water) for at least two weeks.  -- Please keep the incision clean with soap and water, pat your incision dry, do not scrub hard over your incisions.    MEDICATIONS AND PAIN CONTROL  -- No NSAIDs such as aleve, ibuprofen, naproxen  -- No swallowing whole pills until cleared by your surgeon. Crush all medications and mix in liquid. Open all capsules into liquid.  -- Most people find that over-the-counter pain medications (Tylenol) will be sufficient for most pain control.  -- If you're taking prescription narcotics, do not drive or operate heavy machinery. Do not drive if your pain is not controlled enough for you to react quickly safely.  -- Take a stool softener with narcotics medications to prevent constipation.    OTHER INSTRUCTIONS  -- Diet: Follow your diet book and call the bariatric clinic for any questions.  -- Monitor for temp > 101 F, bleeding, redness, purulent drainage, or any sudden, new extreme pain. If any occur, please call our clinic or go to the emergency department if after normal business hours.  -- You may resume your regular diet as tolerated.   -- No heavy lifting (anything >10 lbs or = to a gallon of milk) or strenuous exercise until cleared by a physician.  -- Follow up with Dr. Mcconnell in 2 weeks in clinic for a post-op check. If no appointment is made within the week, please call the clinic to schedule.

## 2025-06-17 NOTE — ANESTHESIA POSTPROCEDURE EVALUATION
Anesthesia Post Evaluation    Patient: Karen Paredes    Procedure(s) Performed: Procedure(s) (LRB):  GASTRECTOMY, SLEEVE, LAPAROSCOPIC with intraop EGD (N/A)    Final Anesthesia Type: general      Patient location during evaluation: PACU  Patient participation: Yes- Able to Participate  Level of consciousness: awake and alert and oriented  Post-procedure vital signs: reviewed and stable  Pain management: adequate  Airway patency: patent    PONV status at discharge: No PONV  Anesthetic complications: no      Cardiovascular status: blood pressure returned to baseline and hemodynamically stable  Respiratory status: unassisted, spontaneous ventilation and room air  Hydration status: euvolemic  Follow-up not needed.              Vitals Value Taken Time   /84 06/17/25 11:13   Temp 36.8 °C (98.2 °F) 06/17/25 11:13   Pulse 78 06/17/25 11:13   Resp 18 06/17/25 11:13   SpO2 99 % 06/17/25 11:13         Event Time   Out of Recovery 09:15:00         Pain/Teresita Score: Pain Rating Prior to Med Admin: 6 (6/16/2025  8:55 PM)  Pain Rating Post Med Admin: 4 (6/16/2025  5:35 PM)  Teresita Score: 10 (6/16/2025  9:54 AM)

## 2025-06-17 NOTE — HOSPITAL COURSE
Sp lap sleeve on 6/16  Uncomplicated surgery  Tolerating appropriate diet, pain controlled on oral pain meds, ambulating at baseline  Fu in 2 weeks in clinic  Meets all criteria for discharge    
No

## 2025-06-17 NOTE — PROGRESS NOTES
Rounded on pt s/p bariatric surgery. Provided pt postooperative review material, Immediate Post Bariatric Surgery Highlights. All information reviewed and questions answered.  Pt displayed underatanding. Hydration reminder wristband with Bariatric Clinic and after hours/weekend number provided to patient.

## 2025-06-17 NOTE — PLAN OF CARE
Pt mother picked up medication from bedside pharmacy and copy of discharged papers instructions. Pt denies pain at this time. Pt educated on signs and symptoms of when to call the doctor. All belongings with the patient . Pt verbalized understanding. Patient waiting for transport .

## 2025-06-17 NOTE — ASSESSMENT & PLAN NOTE
28 y/o F s/p laparoscopic sleeve gastrectomy on 6/16. Progressing appropriately on pathway.     - Tolerated water protocol, continue CLD   - DC mIVF it tolerating CLD  - All meds to be in elixer form, <5mm, or crushed  - Justin tylenol, gabapentin, PRN oxycodone  - PRN anti-emetics  - OOB, IS, ambulate  - Home meds reviewed and reconciled.    - SSI  - GI ppx: protonix  - DVT ppx: lovenox BID    Dispo: Once tolerating diet and adequate pain control

## 2025-06-17 NOTE — PROGRESS NOTES
[facility] - Surgery  General Surgery  Progress Note    Subjective:     History of Present Illness:  No notes on file    Post-Op Info:  Procedure(s) (LRB):  GASTRECTOMY, SLEEVE, LAPAROSCOPIC with intraop EGD (N/A)   1 Day Post-Op     Interval History: NAOE. Afebrile, HDS. Tolerating water protocol, though does report some nausea with liquid tylenol. Pain overall well controlled. Ambulating. Labs pending this morning.     Medications:  Continuous Infusions:   0.9% NaCl   Intravenous Continuous 10 mL/hr at 06/16/25 0654 New Bag at 06/16/25 0654    lactated ringers   Intravenous Continuous 125 mL/hr at 06/16/25 1036 Rate Verify at 06/16/25 1036     Scheduled Meds:   acetaminophen  999.0148 mg Oral Q8H    enoxparin  40 mg Subcutaneous Q12H    gabapentin  300 mg Oral BID    ondansetron  8 mg Intravenous Q6H    pantoprazole  40 mg Intravenous BID    scopolamine  1 patch Transdermal Once     PRN Meds:  Current Facility-Administered Medications:     acetaminophen, 499.5074 mg, Oral, Q24H PRN    dextrose 50%, 12.5 g, Intravenous, PRN    glucagon (human recombinant), 1 mg, Intramuscular, PRN    insulin aspart U-100, 0-5 Units, Subcutaneous, Q6H PRN    oxyCODONE, 5 mg, Oral, Q6H PRN    prochlorperazine, 5 mg, Intravenous, Q6H PRN    simethicone, 40 mg, Oral, QID PRN     Review of patient's allergies indicates:  No Known Allergies  Objective:     Vital Signs (Most Recent):  Temp: 99.1 °F (37.3 °C) (06/17/25 0715)  Pulse: 72 (06/17/25 0715)  Resp: 18 (06/17/25 0715)  BP: 134/88 (06/17/25 0715)  SpO2: 100 % (06/17/25 0715) Vital Signs (24h Range):  Temp:  [97.4 °F (36.3 °C)-99.1 °F (37.3 °C)] 99.1 °F (37.3 °C)  Pulse:  [] 72  Resp:  [16-28] 18  SpO2:  [97 %-100 %] 100 %  BP: (106-140)/(53-88) 134/88     Weight: 107.8 kg (237 lb 10.5 oz)  Body mass index is 44.9 kg/m².    Intake/Output - Last 3 Shifts         06/15 0700 06/16 0659 06/16 0700 06/17 0659 06/17 0700 06/18 0659    I.V. (mL/kg)  197.1 (1.8)     IV Piggyback   "1400     Total Intake(mL/kg)  1597.1 (14.8)     Net  +1597.1            Unmeasured Urine Occurrence  3 x              Physical Exam  Constitutional:       General: She is not in acute distress.  HENT:      Head: Normocephalic and atraumatic.   Eyes:      Conjunctiva/sclera: Conjunctivae normal.   Cardiovascular:      Rate and Rhythm: Normal rate and regular rhythm.   Pulmonary:      Effort: Pulmonary effort is normal. No respiratory distress.   Abdominal:      General: There is no distension.      Palpations: Abdomen is soft.      Comments: Surgical incisions c/d/I   Abdomen appropriately tender to palpation     Musculoskeletal:         General: No swelling.   Skin:     General: Skin is warm and dry.      Capillary Refill: Capillary refill takes less than 2 seconds.   Neurological:      General: No focal deficit present.      Mental Status: She is alert.          Significant Labs:  I have reviewed all pertinent lab results within the past 24 hours.  CBC: No results for input(s): "WBC", "RBC", "HGB", "HCT", "PLT", "MCV", "MCH", "MCHC" in the last 168 hours.  CMP: No results for input(s): "GLU", "CALCIUM", "ALBUMIN", "PROT", "NA", "K", "CO2", "CL", "BUN", "CREATININE", "ALKPHOS", "ALT", "AST", "BILITOT" in the last 168 hours.    Significant Diagnostics:  I have reviewed all pertinent imaging results/findings within the past 24 hours.  Assessment/Plan:     * Obesity  30 y/o F s/p laparoscopic sleeve gastrectomy on 6/16. Progressing appropriately on pathway.     - Tolerated water protocol, continue CLD   - DC mIVF it tolerating CLD  - All meds to be in elixer form, <5mm, or crushed  - Justin tylenol, gabapentin, PRN oxycodone  - PRN anti-emetics  - OOB, IS, ambulate  - Home meds reviewed and reconciled.    - SSI  - GI ppx: protonix  - DVT ppx: lovenox BID    Dispo: Once tolerating diet and adequate pain control          Inna Villa MD  General Surgery  [facility] - Surgery  "

## 2025-06-18 ENCOUNTER — TELEPHONE (OUTPATIENT)
Dept: BARIATRICS | Facility: CLINIC | Age: 30
End: 2025-06-18
Payer: COMMERCIAL

## 2025-06-18 LAB
ESTROGEN SERPL-MCNC: NORMAL PG/ML
INSULIN SERPL-ACNC: NORMAL U[IU]/ML
LAB AP CLINICAL INFORMATION: NORMAL
LAB AP GROSS DESCRIPTION: NORMAL
LAB AP PERFORMING LOCATION(S): NORMAL
LAB AP REPORT FOOTNOTES: NORMAL
T3RU NFR SERPL: NORMAL %

## 2025-06-18 NOTE — TELEPHONE ENCOUNTER
Called and spoke with the pt. She stated she is feeling some bloating.  She took Gas x chewable and it is not helping. She took 2 this morning and 1 around an hour ago. The package stated she can take up to 4 Gas-x tablets in 24 hours.  She will take 1 more gas-x chewable tonight.She has been walking.  She experienced no other symptoms other than some slight nausea, took Zofran and it resolved. She has been able to drink fluids.  She has not had a bowel movement recently.  She will also try Miralax and update the Bariatric Clinic tomorrow.

## 2025-06-18 NOTE — TELEPHONE ENCOUNTER
Copied from CRM #[id number]. Topic: Appointments - Same Day Access  >> Jun 18, 2025  1:15 PM Jesse wrote:  Consult/Advisory     Name Of Caller:        Contact Preference:     Nature of call:  pt states she was discharged yesterday and is having troubles with gas. Taking GasX but isnt working would like to know if something can be prescribed. Please call to advise thank you

## 2025-06-19 NOTE — TELEPHONE ENCOUNTER
"Discussed with Génesis Ortiz who advised, "Keep moving. She can take the highest dose of Gas-X. She can take some Colace if she needs to add that on for a bowel movement. 100 mg BID.   "

## 2025-06-19 NOTE — TELEPHONE ENCOUNTER
Called and spoke with the pt. She stated the gas pain is throughout her abdomen but her incision on her right side is causing her pain.  She stated there is no discharge from the incision site.  She will send in a picture of it through the portal.

## 2025-06-19 NOTE — TELEPHONE ENCOUNTER
Called and spoke with the pt. Incisions look clean, intact and healing in her pictures.  She stated they hurt when she gets up.  May be more muscular. She stated no fever.  She had a small bowel movement yesterday.  She had taken Miralax twice.  She asked about starting her vitamins and the order. Discussed to wait until the dietitian call on 6/23/25 who will assess and provide her with a vitamin start date.  When starting vitamin regimen, pt advised to take B1 (Thiamine) first and then the other vitamins as she can tolerate them.     Messaged MAMADOU Ortiz NP about the bloating and right sided incisional pain, if any further recommendations.

## 2025-06-23 ENCOUNTER — CLINICAL SUPPORT (OUTPATIENT)
Dept: BARIATRICS | Facility: CLINIC | Age: 30
End: 2025-06-23
Payer: COMMERCIAL

## 2025-06-23 DIAGNOSIS — E11.69 TYPE 2 DIABETES MELLITUS WITH OTHER SPECIFIED COMPLICATION, WITHOUT LONG-TERM CURRENT USE OF INSULIN: ICD-10-CM

## 2025-06-23 DIAGNOSIS — Z71.3 DIETARY COUNSELING AND SURVEILLANCE: Primary | ICD-10-CM

## 2025-06-23 DIAGNOSIS — Z98.84 S/P BARIATRIC SURGERY: ICD-10-CM

## 2025-06-23 DIAGNOSIS — E66.813 CLASS 3 SEVERE OBESITY DUE TO EXCESS CALORIES WITH SERIOUS COMORBIDITY AND BODY MASS INDEX (BMI) OF 45.0 TO 49.9 IN ADULT: ICD-10-CM

## 2025-06-23 DIAGNOSIS — E66.01 CLASS 3 SEVERE OBESITY DUE TO EXCESS CALORIES WITH SERIOUS COMORBIDITY AND BODY MASS INDEX (BMI) OF 45.0 TO 49.9 IN ADULT: ICD-10-CM

## 2025-06-23 NOTE — PROGRESS NOTES
Audio Only Telehealth Visit     The patient location is: [place]  The chief complaint leading to consultation is: Follow-up 1 Week s/p laparoscopic sleeve gastrectomy  Visit type: Virtual visit with audio only (telephone)  Total time spent with patient: 15 mins     The reason for the audio only service rather than synchronous audio and video virtual visit was related to technical difficulties or patient preference/necessity.     Each patient to whom I provide medical services by telemedicine is:  (1) informed of the relationship between the physician and patient and the respective role of any other health care provider with respect to management of the patient; and (2) notified that they may decline to receive medical services by telemedicine and may withdraw from such care at any time. Patient verbally consented to receive this service via voice-only telephone call.    This service was not originating from a related E/M service provided within the previous 7 days nor will  to an E/M service or procedure within the next 24 hours or my soonest available appointment.  Prevailing standard of care was able to be met in this audio-only visit.      NUTRITION NOTE    Referring Physician: Meenakshi Mcconnell M.D.   Reason for MNT Referral: Follow-up 1 Week s/p laparoscopic sleeve gastrectomy    PT complains that protein shakes don't taste the same, and drinking less of them.    CURRENT DIET:  Bariatric Liquid Diet    Dehydration assessment:  Urine output/color: light yellow  Chest pain: N  Persistent increased heart rate: N  Fatigue: N  Dizzy/weak: N  N/V: N  BM: Y    Protein and fluid intake assessment: (food diary)    Fluids include: Water, Gatorade Zero  Fluid intake yesterday: 64 oz   Protein supplements: Premier Protein  Protein intake yesterday: 30 g     Vitamins  Barimelt multivitamin with 18 mg iron, 2 per day  Barimelt Calcium Citrate 500 mg with vitamin D, 2 melts, 3 x day  Barimelt B-1, 4 per day  Dong  Fusion B-12 1000 mcg, 1 per 2 days    Medications  Omeprazole: Y  How are you tolerating pain at this time? (rate on a scale from 1 to 10; >7 notify PA/MD): 0. Occasional gas pains  Did you take your acetaminophen and gabapentin for 3 days? Y  Did you have to take additional acetaminophen for break through pain (pain scale 4-6)? Y  Did you have any severe pain that required oxycodone? If yes, how much did you use and do you have left? N    How is the support system at home?  Exercise reminder (light exercise at this time, no lifting above 10 lbs)     Questions for nurse/MA/PA:     BARIATRIC DIET DISCUSSION:  Reinforced post-op nutrition guidelines.  Continue to work on fluid and protein intake.  Discussed using protein water or protein powder instead of protein shakes  Discussed advancing to pureed foods on 6/30/25    PLAN/RECOMMONDATIONS:  Continue bariatric high protein liquid diet.  Maintain protein intake or increase gradually to goal of 60 g prot/day.  Maintain fluid intake or increase gradually to goal of 64 floz/day.  Begin appropriate vitamins & minerals.  Begin or continue light exercise.     Confirmed date and time for 2 week po labs and clinic visit     SESSION TIME: 15 minutes

## 2025-06-30 ENCOUNTER — TELEPHONE (OUTPATIENT)
Dept: OBSTETRICS AND GYNECOLOGY | Facility: CLINIC | Age: 30
End: 2025-06-30
Payer: COMMERCIAL

## 2025-06-30 ENCOUNTER — OFFICE VISIT (OUTPATIENT)
Dept: OBSTETRICS AND GYNECOLOGY | Facility: CLINIC | Age: 30
End: 2025-06-30
Payer: COMMERCIAL

## 2025-06-30 ENCOUNTER — LAB VISIT (OUTPATIENT)
Dept: LAB | Facility: OTHER | Age: 30
End: 2025-06-30
Payer: COMMERCIAL

## 2025-06-30 VITALS
SYSTOLIC BLOOD PRESSURE: 117 MMHG | HEIGHT: 61 IN | HEART RATE: 92 BPM | DIASTOLIC BLOOD PRESSURE: 81 MMHG | WEIGHT: 224.81 LBS | BODY MASS INDEX: 42.44 KG/M2

## 2025-06-30 DIAGNOSIS — Z30.09 ENCOUNTER FOR GENERAL COUNSELING AND ADVICE ON CONTRACEPTIVE MANAGEMENT: Primary | ICD-10-CM

## 2025-06-30 DIAGNOSIS — Z20.2 POTENTIAL EXPOSURE TO STD: ICD-10-CM

## 2025-06-30 LAB
HBV SURFACE AG SERPL QL IA: NORMAL
HIV 1+2 AB+HIV1 P24 AG SERPL QL IA: NEGATIVE
T PALLIDUM IGG+IGM SER QL: NEGATIVE

## 2025-06-30 PROCEDURE — 99203 OFFICE O/P NEW LOW 30 MIN: CPT | Mod: S$GLB,,,

## 2025-06-30 PROCEDURE — 3074F SYST BP LT 130 MM HG: CPT | Mod: CPTII,S$GLB,,

## 2025-06-30 PROCEDURE — 3061F NEG MICROALBUMINURIA REV: CPT | Mod: CPTII,S$GLB,,

## 2025-06-30 PROCEDURE — 3079F DIAST BP 80-89 MM HG: CPT | Mod: CPTII,S$GLB,,

## 2025-06-30 PROCEDURE — 3008F BODY MASS INDEX DOCD: CPT | Mod: CPTII,S$GLB,,

## 2025-06-30 PROCEDURE — 1160F RVW MEDS BY RX/DR IN RCRD: CPT | Mod: CPTII,S$GLB,,

## 2025-06-30 PROCEDURE — 99999 PR PBB SHADOW E&M-EST. PATIENT-LVL IV: CPT | Mod: PBBFAC,,,

## 2025-06-30 PROCEDURE — 86593 SYPHILIS TEST NON-TREP QUANT: CPT

## 2025-06-30 PROCEDURE — 3066F NEPHROPATHY DOC TX: CPT | Mod: CPTII,S$GLB,,

## 2025-06-30 PROCEDURE — 87389 HIV-1 AG W/HIV-1&-2 AB AG IA: CPT

## 2025-06-30 PROCEDURE — 3051F HG A1C>EQUAL 7.0%<8.0%: CPT | Mod: CPTII,S$GLB,,

## 2025-06-30 PROCEDURE — 1159F MED LIST DOCD IN RCRD: CPT | Mod: CPTII,S$GLB,,

## 2025-06-30 PROCEDURE — 87340 HEPATITIS B SURFACE AG IA: CPT

## 2025-06-30 PROCEDURE — 36415 COLL VENOUS BLD VENIPUNCTURE: CPT

## 2025-06-30 PROCEDURE — 87591 N.GONORRHOEAE DNA AMP PROB: CPT

## 2025-06-30 PROCEDURE — 1111F DSCHRG MED/CURRENT MED MERGE: CPT | Mod: CPTII,S$GLB,,

## 2025-06-30 NOTE — PROGRESS NOTES
"CC: Contraception Counseling    Karen Paredes  is a 29 y.o. female  who presents to discuss contraception options.  Her LMP is 2025. Cycles are regular. She is interested in replacing her Nexplanon device, she has had it since .   She is sexually active.  Denies breast or vaginal concerns today.   Denies further issues, problems, or complaints.    Pt feels safe at home and denies domestic violence.     Pap: 10/2024 NILM; and Neg HPV  PCP: Kaleb Mchugh III, MD   Routine Screening Labs: 2025    /81   Pulse 92   Ht 5' 1" (1.549 m)   Wt 102 kg (224 lb 12.8 oz)   LMP 2025 (Approximate)   BMI 42.48 kg/m²      ROS:  Constitutional: no weight loss, weight gain, fever, fatigue  Eyes:  No vision changes, glasses/contacts  ENT/Mouth: No ulcers, sinus problems, ears ringing, headache  Cardiovascular: No inability to lie flat, chest pain, exercise intolerance, swelling, heart palpitations  Respiratory: No wheezing, coughing blood, shortness of breath, or cough  Gastrointestinal: No diarrhea, bloody stool, nausea/vomiting, constipation, gas, hemorrhoids  Genitourinary: No blood in urine, painful urination, urgency of urination, frequency of urination, incomplete emptying, incontinence, abnormal bleeding, painful periods, heavy periods, vaginal discharge, vaginal odor, painful intercourse, sexual problems, bleeding after intercourse.  Musculoskeletal: No muscle weakness  Skin/Breast: No painful breasts, nipple discharge, masses, rash, ulcers  Neurological: No passing out, seizures, numbness, headache  Endocrine: No diabetes, hypothyroid, hyperthyroid, hot flashes, hair loss, abnormal hair growth, acne  Psychiatric: No depression, crying  Hematologic: No bruises, bleeding, swollen lymph nodes, anemia.    PHYSICAL EXAM:   Physical Exam:   Constitutional: She is oriented to person, place, and time. Vital signs are normal. She appears well-developed and well-nourished. No distress.      "   Pulmonary/Chest: Effort normal.        Abdominal: Soft. There is no abdominal tenderness. Hernia confirmed negative in the right inguinal area and confirmed negative in the left inguinal area.     Genitourinary:    Inguinal canal, urethra, vagina, uterus, right adnexa and left adnexa normal.   Rectum:      No external hemorrhoid.      Pelvic exam was performed with patient in the lithotomy position.   The external female genitalia was normal.   No external genitalia lesions identified,Genitalia hair distrobution normal .     Labial bartholins normal.There is no rash, tenderness or lesion on the right labia. There is no rash, tenderness or lesion on the left labia. Cervix is normal. Right adnexum displays no mass, no tenderness and no fullness. Left adnexum displays no mass, no tenderness and no fullness. Vagina exhibits no lesion. No erythema, vaginal discharge, tenderness or bleeding in the vagina.    No foreign body in the vagina.      No signs of injury in the vagina.   Vagina was moist.Cervix exhibits no motion tenderness, no lesion, no discharge, no friability, no tenderness and no polyp. Uterus consistancy normal and Uerus contour normal  Uterus is not enlarged, not tender and not hosting fibroids. Normal urethral meatus.              Neurological: She is alert and oriented to person, place, and time.    Skin: Skin is warm and dry. She is not diaphoretic.         Psychiatric: She has a normal mood and affect. Her behavior is normal. Mood and judgment normal.        ASSESSMENT:   Encounter for general counseling and advice on contraceptive management  -     Device Authorization Order    Potential exposure to STD  -     C. trachomatis/N. gonorrhoeae by AMP DNA [facility]; Cervicovaginal  -     HIV 1/2 Ag/Ab (4th Gen); Future; Expected date: 06/30/2025  -     Treponema Pallidium Antibodies IgG, IgM; Future; Expected date: 06/30/2025  -     Hepatitis B Surface Antigen; Future; Expected date: 06/30/2025       PLAN:    - Discussed contraception options to include COCs, POPs, LNG-IUDs, patches, and vaginal rings. Discussed R/Bs, SEs of all, pt interested in Nexplanon. Referral f/ insurance approval placed.  - Aware that UPT is needed prior to placement.   - STD testing today      The use of the oral contraceptive has been fully discussed with the patient. This includes the proper method to initiate and continue the pills, the need for regular compliance to ensure adequate contraceptive effect, the physiology which make the pill effective, the instructions for what to do in event of a missed pill, and warnings about anticipated minor side effects such as breakthrough spotting, nausea, breast tenderness, weight changes, acne, headaches, etc. She has been told of the more serious potential side effects such as MI, stroke, and deep vein thrombosis, all of which are very unlikely. She has been asked to report any signs of such serious problems immediately. She should back up the pill with a condom during any cycle in which antibiotics are prescribed, and during the first cycle as well. The need for additional protection, such as a condom, to prevent exposure to sexually transmitted diseases has also been discussed- the patient has been clearly reminded that OCP's cannot protect her against diseases such as HIV and others. She understands and wishes to take the medication as prescribed.

## 2025-06-30 NOTE — TELEPHONE ENCOUNTER
Spoke to pt and informed her she can come in early if she would like instead of 3pm. Pt stated she would be here around 2:40 pm.

## 2025-07-01 ENCOUNTER — LAB VISIT (OUTPATIENT)
Dept: LAB | Facility: HOSPITAL | Age: 30
End: 2025-07-01
Payer: COMMERCIAL

## 2025-07-01 ENCOUNTER — CLINICAL SUPPORT (OUTPATIENT)
Dept: BARIATRICS | Facility: CLINIC | Age: 30
End: 2025-07-01
Payer: COMMERCIAL

## 2025-07-01 ENCOUNTER — RESULTS FOLLOW-UP (OUTPATIENT)
Dept: OBSTETRICS AND GYNECOLOGY | Facility: CLINIC | Age: 30
End: 2025-07-01
Payer: COMMERCIAL

## 2025-07-01 ENCOUNTER — OFFICE VISIT (OUTPATIENT)
Dept: BARIATRICS | Facility: CLINIC | Age: 30
End: 2025-07-01
Payer: COMMERCIAL

## 2025-07-01 VITALS
SYSTOLIC BLOOD PRESSURE: 120 MMHG | OXYGEN SATURATION: 96 % | BODY MASS INDEX: 42.67 KG/M2 | DIASTOLIC BLOOD PRESSURE: 70 MMHG | HEART RATE: 83 BPM | HEIGHT: 61 IN | WEIGHT: 226 LBS

## 2025-07-01 DIAGNOSIS — E66.01 MORBID OBESITY WITH BMI OF 40.0-44.9, ADULT: ICD-10-CM

## 2025-07-01 DIAGNOSIS — E11.69 TYPE 2 DIABETES MELLITUS WITH OTHER SPECIFIED COMPLICATION, WITHOUT LONG-TERM CURRENT USE OF INSULIN: ICD-10-CM

## 2025-07-01 DIAGNOSIS — Z98.84 S/P BARIATRIC SURGERY: ICD-10-CM

## 2025-07-01 DIAGNOSIS — Z71.3 DIETARY COUNSELING: Primary | ICD-10-CM

## 2025-07-01 DIAGNOSIS — Z98.84 S/P BARIATRIC SURGERY: Primary | ICD-10-CM

## 2025-07-01 DIAGNOSIS — E66.01 MORBID OBESITY WITH BODY MASS INDEX (BMI) OF 40.0 TO 49.9: ICD-10-CM

## 2025-07-01 LAB
ABSOLUTE EOSINOPHIL (OHS): 0.29 K/UL
ABSOLUTE MONOCYTE (OHS): 0.71 K/UL (ref 0.3–1)
ABSOLUTE NEUTROPHIL COUNT (OHS): 2.18 K/UL (ref 1.8–7.7)
ALBUMIN SERPL BCP-MCNC: 4 G/DL (ref 3.5–5.2)
ALP SERPL-CCNC: 57 UNIT/L (ref 40–150)
ALT SERPL W/O P-5'-P-CCNC: 19 UNIT/L (ref 10–44)
ANION GAP (OHS): 10 MMOL/L (ref 8–16)
AST SERPL-CCNC: 16 UNIT/L (ref 11–45)
BASOPHILS # BLD AUTO: 0.03 K/UL
BASOPHILS NFR BLD AUTO: 0.6 %
BILIRUB SERPL-MCNC: 0.5 MG/DL (ref 0.1–1)
BUN SERPL-MCNC: 4 MG/DL (ref 6–20)
CALCIUM SERPL-MCNC: 9.2 MG/DL (ref 8.7–10.5)
CHLORIDE SERPL-SCNC: 104 MMOL/L (ref 95–110)
CO2 SERPL-SCNC: 25 MMOL/L (ref 23–29)
CREAT SERPL-MCNC: 0.8 MG/DL (ref 0.5–1.4)
ERYTHROCYTE [DISTWIDTH] IN BLOOD BY AUTOMATED COUNT: 14.7 % (ref 11.5–14.5)
GFR SERPLBLD CREATININE-BSD FMLA CKD-EPI: >60 ML/MIN/1.73/M2
GLUCOSE SERPL-MCNC: 119 MG/DL (ref 70–110)
HCT VFR BLD AUTO: 37.1 % (ref 37–48.5)
HGB BLD-MCNC: 11.7 GM/DL (ref 12–16)
IMM GRANULOCYTES # BLD AUTO: 0.01 K/UL (ref 0–0.04)
IMM GRANULOCYTES NFR BLD AUTO: 0.2 % (ref 0–0.5)
LYMPHOCYTES # BLD AUTO: 1.49 K/UL (ref 1–4.8)
MCH RBC QN AUTO: 25.3 PG (ref 27–31)
MCHC RBC AUTO-ENTMCNC: 31.5 G/DL (ref 32–36)
MCV RBC AUTO: 80 FL (ref 82–98)
NUCLEATED RBC (/100WBC) (OHS): 0 /100 WBC
PLATELET # BLD AUTO: 312 K/UL (ref 150–450)
PMV BLD AUTO: 12.3 FL (ref 9.2–12.9)
POTASSIUM SERPL-SCNC: 4 MMOL/L (ref 3.5–5.1)
PROT SERPL-MCNC: 7.4 GM/DL (ref 6–8.4)
RBC # BLD AUTO: 4.62 M/UL (ref 4–5.4)
RELATIVE EOSINOPHIL (OHS): 6.2 %
RELATIVE LYMPHOCYTE (OHS): 31.6 % (ref 18–48)
RELATIVE MONOCYTE (OHS): 15.1 % (ref 4–15)
RELATIVE NEUTROPHIL (OHS): 46.3 % (ref 38–73)
SODIUM SERPL-SCNC: 139 MMOL/L (ref 136–145)
VIT B12 SERPL-MCNC: 1257 PG/ML (ref 210–950)
WBC # BLD AUTO: 4.71 K/UL (ref 3.9–12.7)

## 2025-07-01 PROCEDURE — 3061F NEG MICROALBUMINURIA REV: CPT | Mod: CPTII,95,, | Performed by: NURSE PRACTITIONER

## 2025-07-01 PROCEDURE — 1160F RVW MEDS BY RX/DR IN RCRD: CPT | Mod: CPTII,95,, | Performed by: NURSE PRACTITIONER

## 2025-07-01 PROCEDURE — 85025 COMPLETE CBC W/AUTO DIFF WBC: CPT

## 2025-07-01 PROCEDURE — 99024 POSTOP FOLLOW-UP VISIT: CPT | Mod: 95,,, | Performed by: NURSE PRACTITIONER

## 2025-07-01 PROCEDURE — 84425 ASSAY OF VITAMIN B-1: CPT

## 2025-07-01 PROCEDURE — 82947 ASSAY GLUCOSE BLOOD QUANT: CPT

## 2025-07-01 PROCEDURE — 1159F MED LIST DOCD IN RCRD: CPT | Mod: CPTII,95,, | Performed by: NURSE PRACTITIONER

## 2025-07-01 PROCEDURE — 82607 VITAMIN B-12: CPT

## 2025-07-01 PROCEDURE — 3066F NEPHROPATHY DOC TX: CPT | Mod: CPTII,95,, | Performed by: NURSE PRACTITIONER

## 2025-07-01 PROCEDURE — 99999 PR PBB SHADOW E&M-EST. PATIENT-LVL I: CPT | Mod: PBBFAC,,, | Performed by: DIETITIAN, REGISTERED

## 2025-07-01 PROCEDURE — 84075 ASSAY ALKALINE PHOSPHATASE: CPT

## 2025-07-01 PROCEDURE — 36415 COLL VENOUS BLD VENIPUNCTURE: CPT

## 2025-07-01 PROCEDURE — 3051F HG A1C>EQUAL 7.0%<8.0%: CPT | Mod: CPTII,95,, | Performed by: NURSE PRACTITIONER

## 2025-07-01 NOTE — PROGRESS NOTES
NUTRITION NOTE    Referring Physician: Dr. Mcconnell  Reason for MNT Referral: Follow-up 2 Weeks s/p Sleeve    Denies N/V/C/D.  Reports doing well.     CURRENT DIET:  Bariatric Liquid Diet    Diet Recall: 35 grams of protein/day; 48+ oz of fluids/day    Diet Includes:   Protein Supplements: Premier shake 1/day  3/4 scrambled egg    EXERCISE:  Walking     Restrictions to Exercise: None. Heat.    VITAMINS/MINERALS:  As directed -John    ASSESSMENT:  Doing well overall.  Adequate protein intake.  Adequate fluid intake.    BARIATRIC DIET DISCUSSION:  Instructed and provided written materials on bariatric pureed and soft diet plans.  Reinforced post-op nutrition guidelines.    PLAN/RECOMMONDATIONS:  Advance to bariatric pureed diet.  May advance to bariatric soft diet in 2 weeks as larissa.  Maintain protein intake.  Maintain fluid intake.  Continue light exercise.  Continue appropriate vitamins & minerals.  - may swallow whole pills as larissa    Return to clinic in 6 weeks.    SESSION TIME: 15 minutes

## 2025-07-01 NOTE — PATIENT INSTRUCTIONS
Phase 2: Bariatric High Protein Pureed Diet  Weeks 3 & 4    Two weeks after surgery, you may be ready to add pureed foods to your diet.  All food should be the consistency of baby food, or thinner.      Protein First   It is very important to pay attention to protein intake during this phase. Meeting protein needs daily will help increase healing, decrease muscle loss, and increase weight loss. Inadequate protein intake can cause delayed wound healing, hair loss, and muscle break down.    Always eat the foods with the highest protein first (see list below)  Eat 3-5 small meals per day (2-4 tbsp each), with protein supplements in between to meet protein needs.  Lean meats and seafood can be pureed with a small amount of liquid, such as broth, in a  or  to baby food consistency. No lumps, bumps or stringy bits.    Follow the pureed diet for the next two weeks.    Daily Goals   grams of protein a day through protein supplements and food  64 oz of fluids per day from water, sugar-free, non-carbonated drinks, and sugar-free popsicles, and sugar-free Jell-O    Tip:  Use a food journal or the [employer] sylvester to track how much you are eating and drinking. Below are Phase 2 foods, portion sizes and grams (g) of protein:    Foods Allowed on Pureed Diet Portion size Protein (g)   Lean meats, poultry, fish, seafood, pureed 1 oz 6-9   Scrambled eggs or Egg Beaters 1 or ¼ cup 6   Beans (red, white, black, lima, stout, fat-free refried, hummus) and lentils, soft-cooked ¼ cup 4   Greek yogurt (under 10 g total sugar) 5 oz 12-15   Low-fat/fat-free cheese (cottage cheese, mozzarella string cheese, ricotta cheese, Baby Bell, cheddar, etc.) 1 oz 7-8   Edamame or tofu, mashed ¼ cup 5   98% fat-free cream soups (add unflavored protein powder, pureed meat/vegetables if desired) ½ cup 1-2   Sugar-free pudding (add protein or pb powder if desired) ½ cup 0-1   Peanut butter powder* 2 Tbsp 5   * Peanut butter powder  contains about 60 calories per serving. Regular peanut butter is 190 calories per serving. Peanut butter powder is a better alternative and is sold in the same section as regular peanut butter.   Protein drinks will make up most of your protein for the day.  Protein drinks should contain 20-30 g protein and no more than 4 grams of sugar per serving.  Always have protein when you eat.  Sip fluids in between meals.  Do not drink with meals, 30 minutes before, or 30 minutes after meals.   Mashed potatoes, grits, and oatmeal ARE NOT part of the pureed diet    *If food feels stuck, do not try to push it down with fluids; walk around*    Keep Doing the Following  Taking your vitamins - multivitamin with iron, calcium citrate with Vit D, B-1 or Super B-Complex, and B-12 (include Vit A if you had LASHAY-S surgery)  Physical Activity/Light Exercise (remember not to lift, carry, pull, push, anything over 10 lbs)   Tracking your food, drink, and exercise with the [employer] sylvester or on paper  Coming to the bariatric clinic for follow-up appointments and lab work      Bariatric Phase 2 Pureed Sample Menu    Below is a sample Phase 2 schedule and menu. Note, you should eat or drink every hour and ensure you are adhering to 30-30-30 rule: No fluids 30 minutes before eating, spend 30 mins eating, wait 30 minutes before drinking fluids. Adjust this schedule according to your day.     Timing Item Protein (g) Calories   8am-8:30am 1 egg or ¼ cup Egg Beaters 6 78   9am-9:30am 1 cup water or sugar-free drink 0 0-15   10am-10:30am Protein shake 30 160   11am-11:30am 1 cup water or sugar-free drink 0 0-15   12pm-12:30pm 1 mozzarella string cheese 7 80   1pm-1:30pm 1 cup water or sugar-free drink  0 0-15   2pm-2:30pm Protein shake 30 160   3pm-3:30pm 1 cup water or sugar-free drink  0 0-15   4pm-4:30pm ¼ container nonfat plain Greek yogurt with 1 tbsp pureeed peaches 4 28   5pm-5:30pm 1 cup water or sugar-free drink 0 0-15   6pm-6:30pm ¼  cup mashed beans 4 112   7pm-7:30pm 1 cup water or sugar-free drink 0 0-15   8pm-8:30pm ¼ sugar free pudding cup mixed with 1 tsp peanut butter powder 1 17   Total  [phone]     High Protein Recipes for the Pureed Diet    High Protein Creamy Chicken Soup  (10g protein per 1 cup serving)    Empty 1 can of 98% fat free cream of chicken soup into saucepan. In a bowl, blend 1 scoop of unflavored protein powder with 1 can of skim milk until smooth.  Add protein milk to saucepan and heat to warm. (Note: Do NOT boil. Protein powder may clump if heated too hot).     High Protein Pudding  (14g protein per ½ cup serving)    Add 2 scoops protein powder to 2 cups cold skim milk and mix well.  Stir in dry Jell-O Sugar-Free Instant Pudding mix.  Chill and Enjoy!    Chocolate Peanut Butter Mousse  (14g protein per 3 oz serving)    Mix 6 oz plain Greek yogurt with 4 tbsp chocolate PB2    [place] Chicken Dip  Adapted from: https://www.bariatricfoodcoach.com/buffalo-chicken-salad-wls-recipe/  Ingredients:  2 cups canned chicken breast, drained  1/4 cup light kaur   1/2 tsp onion powder  3 tbsp buffalo sauce/hot sauce    Directions:  Mix all of the ingredients in a  or   Puree until smooth  Season to taste    Nutrition Facts:  Serving size: 2 oz; # Servings: 4  Calories 104; Total Fat 3 g; Saturated Fat 1 g; Trans Fat 1 g ; Cholesterol 62 mg; Sodium 541 mg; Total Carbs 1 g; Fiber 1 g; Sugar 1 g; Protein 17 g

## 2025-07-01 NOTE — PROGRESS NOTES
The patient location is: [place]  The chief complaint leading to consultation is: 2 week post op    Visit type: audiovisual    Face to Face time with patient: 10  15 minutes of total time spent on the encounter, which includes face to face time and non-face to face time preparing to see the patient (eg, review of tests), Obtaining and/or reviewing separately obtained history, Documenting clinical information in the electronic or other health record, Independently interpreting results (not separately reported) and communicating results to the patient/family/caregiver, or Care coordination (not separately reported).         Each patient to whom he or she provides medical services by telemedicine is:  (1) informed of the relationship between the physician and patient and the respective role of any other health care provider with respect to management of the patient; and (2) notified that he or she may decline to receive medical services by telemedicine and may withdraw from such care at any time.    Notes:             BARIATRIC POST-OPERATIVE VISIT:    HPI:  Karen Paredes is a 29 y.o. year old female presents for 2 week post op visit following sleeve.  she is doing well and tolerating the diet without difficulty.  she has no complaints.  Denies: nausea, vomiting, abdominal pain, changes in bowel movement pattern, fever, chills, dysphagia, chest pain, and shortness of breath.    Review of Systems   Constitutional:  Negative for activity change and fatigue.   Respiratory:  Negative for cough and shortness of breath.    Cardiovascular:  Negative for chest pain, palpitations and leg swelling.   Gastrointestinal:  Negative for abdominal pain, nausea and vomiting.   Endocrine: Negative for polydipsia, polyphagia and polyuria.   Genitourinary:  Negative for dysuria.   Musculoskeletal:  Negative for gait problem.   Skin:  Negative for rash.   Allergic/Immunologic: Negative for immunocompromised state.   Neurological:   Negative for dizziness, syncope and weakness.   Hematological:  Does not bruise/bleed easily.   Psychiatric/Behavioral:  Negative for behavioral problems.        EXERCISE & VITAMINS:  See Bariatric Assessment    MEDICATIONS/ALLERGIES:  Have been reviewed.    DIET: Liquid Bariatric Diet.  1 protein shakes daily, ~30 grams protein.  64 fl oz SF clear beverage.      Chicken broth     See Dietician note from today for a more detailed assessment.      Physical Exam  Vitals and nursing note reviewed.   Constitutional:       Appearance: Normal appearance. She is obese.   Neurological:      Mental Status: She is alert.         ASSESSMENT:  - Morbid obesity s/p sleeve gastrectomy on 6/16/25.  - Co-morbidities: diabetes mellitus  -  Weight loss, 19#'s and 17% EWL  -  Exercise routine moving around   - Good Diet  - Good Vitamin regimen    PLAN:  - Ursodiol 500 mg daily for 6 months  - Anti-Acid medication,  daily for 3 months  - No lifting more than 10 lbs for 6 weeks  - Miralax daily for constipation prn   - Emphasized the importance of regular exercise and adherence to bariatric diet to achieve maximum weight loss.  - Encouraged patient to start regular exercise.  - Follow-up with dietician to advance diet.  - Continue daily vitamins and medications.  - RTC in 6 weeks or sooner if needed.  - Call the office for any issues.  - Check labs today.    Post Discharge     2 Weeks     Total Opioids Used (Outpatient): not given at d/c

## 2025-07-02 ENCOUNTER — PATIENT MESSAGE (OUTPATIENT)
Dept: BARIATRICS | Facility: CLINIC | Age: 30
End: 2025-07-02
Payer: COMMERCIAL

## 2025-07-05 LAB
C TRACH DNA SPEC QL NAA+PROBE: NOT DETECTED
CTGC SOURCE (OHS) ORD-325: NORMAL
N GONORRHOEA DNA UR QL NAA+PROBE: NOT DETECTED

## 2025-07-07 ENCOUNTER — PATIENT MESSAGE (OUTPATIENT)
Dept: BARIATRICS | Facility: CLINIC | Age: 30
End: 2025-07-07
Payer: COMMERCIAL

## 2025-07-08 ENCOUNTER — PATIENT MESSAGE (OUTPATIENT)
Dept: BARIATRICS | Facility: CLINIC | Age: 30
End: 2025-07-08
Payer: COMMERCIAL

## 2025-07-08 LAB — W VITAMIN B1: 49 UG/L

## 2025-07-10 ENCOUNTER — TELEPHONE (OUTPATIENT)
Dept: OBSTETRICS AND GYNECOLOGY | Facility: CLINIC | Age: 30
End: 2025-07-10
Payer: COMMERCIAL

## 2025-07-10 NOTE — TELEPHONE ENCOUNTER
Received a message stating pt tried to call me back to get her nexplanon appt rescheduled.     Rescheduled pt for 7/15/25.

## 2025-07-10 NOTE — TELEPHONE ENCOUNTER
Pt had not shown up for nexplanon procedure; called to get her rescheduled.   No answer. Left message.

## 2025-07-14 ENCOUNTER — TELEPHONE (OUTPATIENT)
Dept: OBSTETRICS AND GYNECOLOGY | Facility: CLINIC | Age: 30
End: 2025-07-14
Payer: COMMERCIAL

## 2025-07-15 ENCOUNTER — TELEPHONE (OUTPATIENT)
Dept: OBSTETRICS AND GYNECOLOGY | Facility: CLINIC | Age: 30
End: 2025-07-15
Payer: COMMERCIAL

## 2025-07-15 NOTE — TELEPHONE ENCOUNTER
Spoke with pt; rescheduled nexplanon reinsertion appt to 8/18 at her request.               Copied from CRM #[id number]. Topic: Appointments - Appointment Rescheduling  >> Jul 15, 2025  8:39 AM Clarence wrote:  .Name of Who is Calling:Pt                What is the request in detail:Pt calling because she want to reschedule her procedure visit and asking for a call back.Please call back to further assist.                 Can the clinic reply by [facility]: No                What Number to Call Back if not in [facility]: [phone]

## 2025-07-25 ENCOUNTER — PATIENT MESSAGE (OUTPATIENT)
Dept: BARIATRICS | Facility: CLINIC | Age: 30
End: 2025-07-25
Payer: COMMERCIAL

## 2025-08-12 ENCOUNTER — LAB VISIT (OUTPATIENT)
Dept: LAB | Facility: HOSPITAL | Age: 30
End: 2025-08-12
Attending: NURSE PRACTITIONER
Payer: COMMERCIAL

## 2025-08-12 ENCOUNTER — CLINICAL SUPPORT (OUTPATIENT)
Dept: BARIATRICS | Facility: CLINIC | Age: 30
End: 2025-08-12
Payer: COMMERCIAL

## 2025-08-12 ENCOUNTER — PATIENT MESSAGE (OUTPATIENT)
Dept: BARIATRICS | Facility: CLINIC | Age: 30
End: 2025-08-12

## 2025-08-12 ENCOUNTER — OFFICE VISIT (OUTPATIENT)
Dept: BARIATRICS | Facility: CLINIC | Age: 30
End: 2025-08-12
Payer: COMMERCIAL

## 2025-08-12 VITALS
BODY MASS INDEX: 41.5 KG/M2 | OXYGEN SATURATION: 99 % | HEIGHT: 61 IN | HEART RATE: 76 BPM | TEMPERATURE: 98 F | DIASTOLIC BLOOD PRESSURE: 76 MMHG | SYSTOLIC BLOOD PRESSURE: 109 MMHG | WEIGHT: 219.81 LBS

## 2025-08-12 DIAGNOSIS — Z98.890 POST-OPERATIVE STATE: Primary | ICD-10-CM

## 2025-08-12 DIAGNOSIS — E11.69 TYPE 2 DIABETES MELLITUS WITH OTHER SPECIFIED COMPLICATION, WITHOUT LONG-TERM CURRENT USE OF INSULIN: ICD-10-CM

## 2025-08-12 DIAGNOSIS — E66.01 MORBID OBESITY WITH BMI OF 40.0-44.9, ADULT: ICD-10-CM

## 2025-08-12 DIAGNOSIS — Z98.84 S/P BARIATRIC SURGERY: ICD-10-CM

## 2025-08-12 DIAGNOSIS — Z71.3 DIETARY COUNSELING: Primary | ICD-10-CM

## 2025-08-12 DIAGNOSIS — E66.01 MORBID OBESITY WITH BODY MASS INDEX (BMI) OF 40.0 TO 49.9: ICD-10-CM

## 2025-08-12 DIAGNOSIS — R63.4 WEIGHT LOSS: ICD-10-CM

## 2025-08-12 LAB
25(OH)D3+25(OH)D2 SERPL-MCNC: 48 NG/ML (ref 30–96)
ABSOLUTE EOSINOPHIL (OHS): 0.15 K/UL
ABSOLUTE MONOCYTE (OHS): 0.51 K/UL (ref 0.3–1)
ABSOLUTE NEUTROPHIL COUNT (OHS): 2.91 K/UL (ref 1.8–7.7)
ALBUMIN SERPL BCP-MCNC: 3.9 G/DL (ref 3.5–5.2)
ALP SERPL-CCNC: 63 UNIT/L (ref 40–150)
ALT SERPL W/O P-5'-P-CCNC: 12 UNIT/L (ref 0–55)
ANION GAP (OHS): 8 MMOL/L (ref 8–16)
AST SERPL-CCNC: 18 UNIT/L (ref 0–50)
BASOPHILS # BLD AUTO: 0.03 K/UL
BASOPHILS NFR BLD AUTO: 0.6 %
BILIRUB SERPL-MCNC: 0.5 MG/DL (ref 0.1–1)
BUN SERPL-MCNC: 7 MG/DL (ref 6–20)
CALCIUM SERPL-MCNC: 8.7 MG/DL (ref 8.7–10.5)
CHLORIDE SERPL-SCNC: 106 MMOL/L (ref 95–110)
CHOLEST SERPL-MCNC: 132 MG/DL (ref 120–199)
CHOLEST/HDLC SERPL: 3.6 {RATIO} (ref 2–5)
CO2 SERPL-SCNC: 24 MMOL/L (ref 23–29)
CREAT SERPL-MCNC: 0.8 MG/DL (ref 0.5–1.4)
ERYTHROCYTE [DISTWIDTH] IN BLOOD BY AUTOMATED COUNT: 15 % (ref 11.5–14.5)
GFR SERPLBLD CREATININE-BSD FMLA CKD-EPI: >60 ML/MIN/1.73/M2
GLUCOSE SERPL-MCNC: 94 MG/DL (ref 70–110)
HCT VFR BLD AUTO: 35.4 % (ref 37–48.5)
HDLC SERPL-MCNC: 37 MG/DL (ref 40–75)
HDLC SERPL: 28 % (ref 20–50)
HGB BLD-MCNC: 11.4 GM/DL (ref 12–16)
IMM GRANULOCYTES # BLD AUTO: 0.01 K/UL (ref 0–0.04)
IMM GRANULOCYTES NFR BLD AUTO: 0.2 % (ref 0–0.5)
IRON SATN MFR SERPL: 15 % (ref 20–50)
IRON SERPL-MCNC: 63 UG/DL (ref 30–160)
LDLC SERPL CALC-MCNC: 82.8 MG/DL (ref 63–159)
LYMPHOCYTES # BLD AUTO: 1.46 K/UL (ref 1–4.8)
MCH RBC QN AUTO: 26.1 PG (ref 27–31)
MCHC RBC AUTO-ENTMCNC: 32.2 G/DL (ref 32–36)
MCV RBC AUTO: 81 FL (ref 82–98)
NONHDLC SERPL-MCNC: 95 MG/DL
NUCLEATED RBC (/100WBC) (OHS): 0 /100 WBC
PLATELET # BLD AUTO: 301 K/UL (ref 150–450)
PMV BLD AUTO: 11.4 FL (ref 9.2–12.9)
POTASSIUM SERPL-SCNC: 3.9 MMOL/L (ref 3.5–5.1)
PROT SERPL-MCNC: 7.3 GM/DL (ref 6–8.4)
RBC # BLD AUTO: 4.36 M/UL (ref 4–5.4)
RELATIVE EOSINOPHIL (OHS): 3 %
RELATIVE LYMPHOCYTE (OHS): 28.8 % (ref 18–48)
RELATIVE MONOCYTE (OHS): 10.1 % (ref 4–15)
RELATIVE NEUTROPHIL (OHS): 57.3 % (ref 38–73)
SODIUM SERPL-SCNC: 138 MMOL/L (ref 136–145)
TIBC SERPL-MCNC: 419 UG/DL (ref 250–450)
TRANSFERRIN SERPL-MCNC: 283 MG/DL (ref 200–375)
TRIGL SERPL-MCNC: 61 MG/DL (ref 30–150)
VIT B12 SERPL-MCNC: 775 PG/ML (ref 210–950)
WBC # BLD AUTO: 5.07 K/UL (ref 3.9–12.7)

## 2025-08-12 PROCEDURE — 36415 COLL VENOUS BLD VENIPUNCTURE: CPT

## 2025-08-12 PROCEDURE — 82306 VITAMIN D 25 HYDROXY: CPT

## 2025-08-12 PROCEDURE — 85025 COMPLETE CBC W/AUTO DIFF WBC: CPT

## 2025-08-12 PROCEDURE — 83718 ASSAY OF LIPOPROTEIN: CPT

## 2025-08-12 PROCEDURE — 84425 ASSAY OF VITAMIN B-1: CPT

## 2025-08-12 PROCEDURE — 99999 PR PBB SHADOW E&M-EST. PATIENT-LVL I: CPT | Mod: PBBFAC,,, | Performed by: DIETITIAN, REGISTERED

## 2025-08-12 PROCEDURE — 82607 VITAMIN B-12: CPT

## 2025-08-12 PROCEDURE — 99999 PR PBB SHADOW E&M-EST. PATIENT-LVL V: CPT | Mod: PBBFAC,,, | Performed by: PHYSICIAN ASSISTANT

## 2025-08-12 PROCEDURE — 82247 BILIRUBIN TOTAL: CPT

## 2025-08-12 PROCEDURE — 83540 ASSAY OF IRON: CPT

## 2025-08-15 LAB — W VITAMIN B1: 57 UG/L

## 2025-08-18 ENCOUNTER — PROCEDURE VISIT (OUTPATIENT)
Dept: OBSTETRICS AND GYNECOLOGY | Facility: CLINIC | Age: 30
End: 2025-08-18
Payer: COMMERCIAL

## 2025-08-18 VITALS
WEIGHT: 219.38 LBS | HEART RATE: 83 BPM | RESPIRATION RATE: 18 BRPM | HEIGHT: 61 IN | SYSTOLIC BLOOD PRESSURE: 135 MMHG | DIASTOLIC BLOOD PRESSURE: 88 MMHG | OXYGEN SATURATION: 98 % | BODY MASS INDEX: 41.42 KG/M2

## 2025-08-18 DIAGNOSIS — Z30.8 ENCOUNTER FOR OTHER CONTRACEPTIVE MANAGEMENT: Primary | ICD-10-CM

## 2025-08-18 LAB
B-HCG UR QL: NEGATIVE
CTP QC/QA: YES

## 2025-08-18 PROCEDURE — 11983 REMOVE/INSERT DRUG IMPLANT: CPT | Mod: S$GLB,,,

## 2025-08-18 PROCEDURE — 99499 UNLISTED E&M SERVICE: CPT | Mod: S$GLB,,,

## 2025-08-18 PROCEDURE — 81025 URINE PREGNANCY TEST: CPT | Mod: S$GLB,,,

## 2025-08-21 ENCOUNTER — PATIENT OUTREACH (OUTPATIENT)
Dept: ADMINISTRATIVE | Facility: HOSPITAL | Age: 30
End: 2025-08-21
Payer: COMMERCIAL

## 2025-08-21 DIAGNOSIS — E11.69 TYPE 2 DIABETES MELLITUS WITH OTHER SPECIFIED COMPLICATION, WITHOUT LONG-TERM CURRENT USE OF INSULIN: Primary | ICD-10-CM

## (undated) DEVICE — STAPLER ECHELON FLEX 60MM 44CM

## (undated) DEVICE — SEE MEDLINE ITEM 157110

## (undated) DEVICE — CONTAINER SPECIMEN STRL 4OZ

## (undated) DEVICE — SUPPORT ULNA NERVE PROTECTOR

## (undated) DEVICE — SUT MCRYL PLUS 4-0 PS2 27IN

## (undated) DEVICE — CANISTER SUCTION 2 LTR

## (undated) DEVICE — TRAY MINOR GEN SURG OMC

## (undated) DEVICE — RELOAD ECHELON FLEX GRN 60MM

## (undated) DEVICE — SEE MEDLINE ITEM 156946

## (undated) DEVICE — POSITIONER HEAD DONUT 9IN FOAM

## (undated) DEVICE — SEE MEDLINE ITEM 146347

## (undated) DEVICE — CANNULA ENDOPATH XCEL 5X100MM

## (undated) DEVICE — SUT VICRYL+ 27 UR-6 VIOL

## (undated) DEVICE — TROCAR ENDOPATH XCEL 5X100MM

## (undated) DEVICE — DRAPE CORETEMP FLD WRM 56X62IN

## (undated) DEVICE — ELECTRODE REM PLYHSV RETURN 9

## (undated) DEVICE — SEE MEDLINE ITEM 146313

## (undated) DEVICE — TROCAR ENDOPATH XCEL 11MM 10CM

## (undated) DEVICE — TROCAR ENDOPATH XCEL 12MM 10CM

## (undated) DEVICE — KIT ANTIFOG

## (undated) DEVICE — SYR 10CC LUER LOCK

## (undated) DEVICE — ADHESIVE DERMABOND ADVANCED

## (undated) DEVICE — PENCIL ROCKER SWITCH 10FT CORD

## (undated) DEVICE — SEE L#120831

## (undated) DEVICE — DRAPE ABDOMINAL TIBURON 14X11

## (undated) DEVICE — GLOVE SURGICAL LATEX SZ 6.5

## (undated) DEVICE — ELECTRODE MEGADYNE RETURN DUAL

## (undated) DEVICE — RELOAD ECHELON FLEX BLU 60MM

## (undated) DEVICE — SCALPEL #11 BLADE STRL DISP

## (undated) DEVICE — SUCTION COAGULATOR 12FR 6IN

## (undated) DEVICE — CATH ALL PUR URTHL RR 10FR

## (undated) DEVICE — SEE L#152161

## (undated) DEVICE — NDL HYPO REG 25G X 1 1/2

## (undated) DEVICE — SHEARS HARMONIC ADV CRV 45CM

## (undated) DEVICE — SPONGE TONSIL COTTON FILL MED

## (undated) DEVICE — BLANKET LOWER BODY 55.9X40.2IN

## (undated) DEVICE — Device

## (undated) DEVICE — PACK TONSIL CUSTOM

## (undated) DEVICE — ELECTRODE BLADE INSULATED 1 IN

## (undated) DEVICE — TUBING HF INSUFFLATION W/ FLTR

## (undated) DEVICE — CAUTERY PUSHBUTTON PENCIL